# Patient Record
Sex: FEMALE | Race: WHITE | NOT HISPANIC OR LATINO | Employment: OTHER | URBAN - METROPOLITAN AREA
[De-identification: names, ages, dates, MRNs, and addresses within clinical notes are randomized per-mention and may not be internally consistent; named-entity substitution may affect disease eponyms.]

---

## 2017-01-06 ENCOUNTER — HOSPITAL ENCOUNTER (OUTPATIENT)
Dept: INFUSION CENTER | Facility: CLINIC | Age: 82
Discharge: HOME | End: 2017-01-06
Payer: MEDICARE

## 2017-01-06 LAB
ERYTHROCYTE [DISTWIDTH] IN BLOOD BY AUTOMATED COUNT: 17.2 % (ref 11.6–15.1)
GRANULOCYTES NFR BLD AUTO: 72.3 % (ref 47–80)
GRANULOCYTES NFR BLD: 3.7 THOUSAND/ΜL (ref 1.85–7.82)
HCT VFR BLD AUTO: 29.5 % (ref 34.8–46.1)
HGB BLD-MCNC: 9.5 G/DL (ref 11.5–15.4)
LYMPHOCYTES # BLD AUTO: 1.1 THOUSANDS/ΜL (ref 0.6–4.47)
LYMPHOCYTES NFR BLD AUTO: 22 % (ref 14–44)
MCH RBC QN AUTO: 29 PG (ref 26.8–34.3)
MCHC RBC AUTO-ENTMCNC: 32.1 G/DL (ref 31.4–37.4)
MCV RBC AUTO: 90 FL (ref 82–98)
MONOCYTES # BLD AUTO: 0.3 THOUSAND/ΜL (ref 0.17–1.22)
MONOCYTES NFR BLD AUTO: 5 % (ref 4–12)
PLATELET # BLD AUTO: 288 THOUSANDS/UL (ref 149–390)
PMV BLD AUTO: 7.6 FL (ref 8.9–12.7)
RBC # BLD AUTO: 3.27 MILLION/UL (ref 3.81–5.12)
WBC # BLD AUTO: 5.1 THOUSAND/UL (ref 4.31–10.16)
WBC NRBC COR # BLD: 5.1 THOUSAND/UL (ref 4.31–10.16)

## 2017-01-06 PROCEDURE — 85025 COMPLETE CBC W/AUTO DIFF WBC: CPT | Performed by: INTERNAL MEDICINE

## 2017-01-06 NOTE — PROGRESS NOTES
Presented today for port flush & labs port flushed with NSS d/t sensitivity to heparin  Will return for next appt as scheduled

## 2017-01-25 ENCOUNTER — HOSPITAL ENCOUNTER (OUTPATIENT)
Dept: INFUSION CENTER | Facility: CLINIC | Age: 82
Discharge: HOME | End: 2017-01-25
Payer: MEDICARE

## 2017-01-25 LAB
ANISOCYTOSIS BLD QL SMEAR: PRESENT
BASOPHILS # BLD AUTO: 0 THOUSAND/UL (ref 0–0.1)
BASOPHILS NFR MAR MANUAL: 0 % (ref 0–1)
EOSINOPHIL # BLD AUTO: 0.36 THOUSAND/UL (ref 0–0.61)
EOSINOPHIL NFR BLD MANUAL: 7 % (ref 0–6)
ERYTHROCYTE [DISTWIDTH] IN BLOOD BY AUTOMATED COUNT: 17.7 % (ref 11.6–15.1)
HCT VFR BLD AUTO: 30.3 % (ref 34.8–46.1)
HGB BLD-MCNC: 9.6 G/DL (ref 11.5–15.4)
LYMPHOCYTES # BLD AUTO: 0.97 THOUSAND/UL (ref 0.6–4.47)
LYMPHOCYTES # BLD AUTO: 19 % (ref 14–44)
MCH RBC QN AUTO: 28.4 PG (ref 26.8–34.3)
MCHC RBC AUTO-ENTMCNC: 31.7 G/DL (ref 31.4–37.4)
MCV RBC AUTO: 90 FL (ref 82–98)
MONOCYTES # BLD AUTO: 0.15 THOUSAND/UL (ref 0–1.22)
MONOCYTES NFR BLD AUTO: 3 % (ref 4–12)
NEUTS BAND NFR BLD MANUAL: 0 % (ref 0–8)
NEUTS SEG # BLD: 3.57 THOUSAND/UL (ref 1.81–6.82)
NEUTS SEG NFR BLD AUTO: 70 % (ref 43–75)
PLATELET # BLD AUTO: 278 THOUSANDS/UL (ref 149–390)
PLATELET BLD QL SMEAR: ADEQUATE
PMV BLD AUTO: 7.4 FL (ref 8.9–12.7)
RBC # BLD AUTO: 3.39 MILLION/UL (ref 3.81–5.12)
TOTAL CELLS COUNTED SPEC: 100
VARIANT LYMPHS # BLD AUTO: 1 % (ref 0–0)
WBC # BLD AUTO: 5.1 THOUSAND/UL (ref 4.31–10.16)
WBC NRBC COR # BLD: 5.1 THOUSAND/UL (ref 4.31–10.16)

## 2017-01-25 PROCEDURE — 85027 COMPLETE CBC AUTOMATED: CPT | Performed by: INTERNAL MEDICINE

## 2017-01-25 PROCEDURE — 85007 BL SMEAR W/DIFF WBC COUNT: CPT | Performed by: INTERNAL MEDICINE

## 2017-01-25 NOTE — PROGRESS NOTES
Patient tolerated her central lab draw from her port well without adverse affect   Patient declined after visit summary

## 2017-01-25 NOTE — PLAN OF CARE
Problem: Potential for Falls  Goal: Patient will remain free of falls  INTERVENTIONS:  - Assess patient frequently for physical needs  - Identify cognitive and physical deficits and behaviors that affect risk of falls    - [place] fall precautions as indicated by assessment   - Educate patient/family on patient safety including physical limitations  - Instruct patient to call for assistance with activity based on assessment  - Modify environment to reduce risk of injury  - Consider OT/PT consult to assist with strengthening/mobility   Outcome: Progressing

## 2017-02-27 ENCOUNTER — ALLSCRIPTS OFFICE VISIT (OUTPATIENT)
Dept: OTHER | Facility: OTHER | Age: 82
End: 2017-02-27

## 2017-03-03 ENCOUNTER — ALLSCRIPTS OFFICE VISIT (OUTPATIENT)
Dept: OTHER | Facility: OTHER | Age: 82
End: 2017-03-03

## 2017-03-03 DIAGNOSIS — J20.9 ACUTE BRONCHITIS: ICD-10-CM

## 2017-03-04 ENCOUNTER — TRANSCRIBE ORDERS (OUTPATIENT)
Dept: ADMINISTRATIVE | Facility: HOSPITAL | Age: 82
End: 2017-03-04

## 2017-03-04 ENCOUNTER — HOSPITAL ENCOUNTER (OUTPATIENT)
Dept: RADIOLOGY | Facility: MEDICAL CENTER | Age: 82
Discharge: HOME | End: 2017-03-04
Payer: MEDICARE

## 2017-03-04 DIAGNOSIS — J20.9 ACUTE BRONCHITIS: ICD-10-CM

## 2017-03-04 PROCEDURE — 71020 HB CHEST X-RAY 2VW FRONTAL&LATL: CPT

## 2017-03-15 ENCOUNTER — HOSPITAL ENCOUNTER (OUTPATIENT)
Dept: INFUSION CENTER | Facility: CLINIC | Age: 82
Discharge: HOME | End: 2017-03-15
Payer: MEDICARE

## 2017-03-20 ENCOUNTER — HOSPITAL ENCOUNTER (OUTPATIENT)
Dept: INFUSION CENTER | Facility: CLINIC | Age: 82
Discharge: HOME | End: 2017-03-20
Payer: MEDICARE

## 2017-03-27 ENCOUNTER — HOSPITAL ENCOUNTER (OUTPATIENT)
Dept: INFUSION CENTER | Facility: CLINIC | Age: 82
Discharge: HOME | End: 2017-03-27
Payer: MEDICARE

## 2017-03-27 LAB
ANISOCYTOSIS BLD QL SMEAR: PRESENT
BASOPHILS # BLD AUTO: 0 THOUSAND/UL (ref 0–0.1)
BASOPHILS NFR MAR MANUAL: 0 % (ref 0–1)
EOSINOPHIL # BLD AUTO: 0.13 THOUSAND/UL (ref 0–0.61)
EOSINOPHIL NFR BLD MANUAL: 3 % (ref 0–6)
ERYTHROCYTE [DISTWIDTH] IN BLOOD BY AUTOMATED COUNT: 17.4 % (ref 11.6–15.1)
HCT VFR BLD AUTO: 30.6 % (ref 34.8–46.1)
HGB BLD-MCNC: 9.9 G/DL (ref 11.5–15.4)
LYMPHOCYTES # BLD AUTO: 0.92 THOUSAND/UL (ref 0.6–4.47)
LYMPHOCYTES # BLD AUTO: 22 % (ref 14–44)
MCH RBC QN AUTO: 28.5 PG (ref 26.8–34.3)
MCHC RBC AUTO-ENTMCNC: 32.3 G/DL (ref 31.4–37.4)
MCV RBC AUTO: 88 FL (ref 82–98)
MONOCYTES # BLD AUTO: 0.29 THOUSAND/UL (ref 0–1.22)
MONOCYTES NFR BLD AUTO: 7 % (ref 4–12)
NEUTS BAND NFR BLD MANUAL: 0 % (ref 0–8)
NEUTS SEG # BLD: 2.81 THOUSAND/UL (ref 1.81–6.82)
NEUTS SEG NFR BLD AUTO: 67 % (ref 43–75)
PLATELET # BLD AUTO: 243 THOUSANDS/UL (ref 149–390)
PLATELET BLD QL SMEAR: ADEQUATE
PMV BLD AUTO: 7.6 FL (ref 8.9–12.7)
RBC # BLD AUTO: 3.47 MILLION/UL (ref 3.81–5.12)
TOTAL CELLS COUNTED SPEC: 100
VARIANT LYMPHS # BLD AUTO: 1 % (ref 0–0)
WBC # BLD AUTO: 4.2 THOUSAND/UL (ref 4.31–10.16)
WBC NRBC COR # BLD: 4.2 THOUSAND/UL (ref 4.31–10.16)

## 2017-03-27 PROCEDURE — 85007 BL SMEAR W/DIFF WBC COUNT: CPT | Performed by: INTERNAL MEDICINE

## 2017-03-27 PROCEDURE — 85027 COMPLETE CBC AUTOMATED: CPT | Performed by: INTERNAL MEDICINE

## 2017-03-27 NOTE — PLAN OF CARE
Problem: Potential for Falls  Goal: Patient will remain free of falls  INTERVENTIONS:  - Assess patient frequently for physical needs  - Identify cognitive and physical deficits and behaviors that affect risk of falls    - [place] fall precautions as indicated by assessment   - Educate patient/family on patient safety including physical limitations  - Instruct patient to call for assistance with activity based on assessment  - Modify environment to reduce risk of injury  - Consider OT/PT consult to assist with strengthening/mobility   Outcome: Progressing

## 2017-04-12 ENCOUNTER — GENERIC CONVERSION - ENCOUNTER (OUTPATIENT)
Dept: OTHER | Facility: OTHER | Age: 82
End: 2017-04-12

## 2017-05-08 ENCOUNTER — HOSPITAL ENCOUNTER (OUTPATIENT)
Dept: INFUSION CENTER | Facility: CLINIC | Age: 82
Discharge: HOME | End: 2017-05-08
Payer: MEDICARE

## 2017-05-08 LAB
ANISOCYTOSIS BLD QL SMEAR: PRESENT
BASOPHILS # BLD AUTO: 0.05 THOUSAND/UL (ref 0–0.1)
BASOPHILS NFR MAR MANUAL: 1 % (ref 0–1)
EOSINOPHIL # BLD AUTO: 0.16 THOUSAND/UL (ref 0–0.61)
EOSINOPHIL NFR BLD MANUAL: 3 % (ref 0–6)
ERYTHROCYTE [DISTWIDTH] IN BLOOD BY AUTOMATED COUNT: 18.7 % (ref 11.6–15.1)
HCT VFR BLD AUTO: 31 % (ref 34.8–46.1)
HGB BLD-MCNC: 9.9 G/DL (ref 11.5–15.4)
LYMPHOCYTES # BLD AUTO: 0.99 THOUSAND/UL (ref 0.6–4.47)
LYMPHOCYTES # BLD AUTO: 19 % (ref 14–44)
MCH RBC QN AUTO: 28.6 PG (ref 26.8–34.3)
MCHC RBC AUTO-ENTMCNC: 32 G/DL (ref 31.4–37.4)
MCV RBC AUTO: 90 FL (ref 82–98)
MONOCYTES # BLD AUTO: 0.36 THOUSAND/UL (ref 0–1.22)
MONOCYTES NFR BLD AUTO: 7 % (ref 4–12)
NEUTS BAND NFR BLD MANUAL: 1 % (ref 0–8)
NEUTS SEG # BLD: 3.64 THOUSAND/UL (ref 1.81–6.82)
NEUTS SEG NFR BLD AUTO: 69 % (ref 43–75)
OVALOCYTES BLD QL SMEAR: PRESENT
PLATELET # BLD AUTO: 260 THOUSANDS/UL (ref 149–390)
PLATELET BLD QL SMEAR: ADEQUATE
PMV BLD AUTO: 7.6 FL (ref 8.9–12.7)
RBC # BLD AUTO: 3.47 MILLION/UL (ref 3.81–5.12)
TOTAL CELLS COUNTED SPEC: 100
WBC # BLD AUTO: 5.2 THOUSAND/UL (ref 4.31–10.16)
WBC NRBC COR # BLD: 5.2 THOUSAND/UL (ref 4.31–10.16)

## 2017-05-08 PROCEDURE — 85027 COMPLETE CBC AUTOMATED: CPT | Performed by: INTERNAL MEDICINE

## 2017-05-08 PROCEDURE — 85007 BL SMEAR W/DIFF WBC COUNT: CPT | Performed by: INTERNAL MEDICINE

## 2017-05-08 NOTE — PROGRESS NOTES
Patient here for port flush and central line bloodwork  Patient offers no complaints  Blood work drawn with port access  Port flushed per protocol without heparin due to patient allergy  Next appointment scheduled before d/c   Declined AVS

## 2017-06-19 ENCOUNTER — HOSPITAL ENCOUNTER (OUTPATIENT)
Dept: INFUSION CENTER | Facility: CLINIC | Age: 82
Discharge: HOME | End: 2017-06-19
Payer: MEDICARE

## 2017-06-19 PROCEDURE — 96523 IRRIG DRUG DELIVERY DEVICE: CPT

## 2017-06-19 NOTE — PROGRESS NOTES
Pt refusing AVS at this time   Pt aware of future appointments, pt d/dong without difficulty or complaint

## 2017-06-19 NOTE — PROGRESS NOTES
Spoke with Jaiden from Dr Rodriguez [place] office   Per office no script for blood work pt only here for port flush

## 2017-06-29 ENCOUNTER — ALLSCRIPTS OFFICE VISIT (OUTPATIENT)
Dept: OTHER | Facility: OTHER | Age: 82
End: 2017-06-29

## 2017-07-31 ENCOUNTER — HOSPITAL ENCOUNTER (OUTPATIENT)
Dept: INFUSION CENTER | Facility: CLINIC | Age: 82
Discharge: HOME | End: 2017-07-31
Payer: MEDICARE

## 2017-07-31 LAB
ANISOCYTOSIS BLD QL SMEAR: PRESENT
BASOPHILS # BLD AUTO: 0 THOUSAND/UL (ref 0–0.1)
BASOPHILS NFR MAR MANUAL: 0 % (ref 0–1)
EOSINOPHIL # BLD AUTO: 0.1 THOUSAND/UL (ref 0–0.61)
EOSINOPHIL NFR BLD MANUAL: 2 % (ref 0–6)
ERYTHROCYTE [DISTWIDTH] IN BLOOD BY AUTOMATED COUNT: 17.2 % (ref 11.6–15.1)
HCT VFR BLD AUTO: 32.1 % (ref 34.8–46.1)
HGB BLD-MCNC: 10.7 G/DL (ref 11.5–15.4)
LYMPHOCYTES # BLD AUTO: 1.27 THOUSAND/UL (ref 0.6–4.47)
LYMPHOCYTES # BLD AUTO: 26 % (ref 14–44)
MCH RBC QN AUTO: 30.4 PG (ref 26.8–34.3)
MCHC RBC AUTO-ENTMCNC: 33.3 G/DL (ref 31.4–37.4)
MCV RBC AUTO: 91 FL (ref 82–98)
MONOCYTES # BLD AUTO: 0.25 THOUSAND/UL (ref 0–1.22)
MONOCYTES NFR BLD AUTO: 5 % (ref 4–12)
NEUTS BAND NFR BLD MANUAL: 1 % (ref 0–8)
NEUTS SEG # BLD: 3.28 THOUSAND/UL (ref 1.81–6.82)
NEUTS SEG NFR BLD AUTO: 66 % (ref 43–75)
PLATELET # BLD AUTO: 248 THOUSANDS/UL (ref 149–390)
PLATELET BLD QL SMEAR: ADEQUATE
PMV BLD AUTO: 7.3 FL (ref 8.9–12.7)
RBC # BLD AUTO: 3.51 MILLION/UL (ref 3.81–5.12)
TOTAL CELLS COUNTED SPEC: 100
WBC # BLD AUTO: 4.9 THOUSAND/UL (ref 4.31–10.16)
WBC NRBC COR # BLD: 4.9 THOUSAND/UL (ref 4.31–10.16)

## 2017-07-31 PROCEDURE — 85007 BL SMEAR W/DIFF WBC COUNT: CPT | Performed by: INTERNAL MEDICINE

## 2017-07-31 PROCEDURE — 85027 COMPLETE CBC AUTOMATED: CPT | Performed by: INTERNAL MEDICINE

## 2017-07-31 NOTE — PLAN OF CARE
Problem: Potential for Falls  Goal: Patient will remain free of falls  INTERVENTIONS:  - Assess patient frequently for physical needs  -  Identify cognitive and physical deficits and behaviors that affect risk of falls    -  [place] fall precautions as indicated by assessment   - Educate patient/family on patient safety including physical limitations  - Instruct patient to call for assistance with activity based on assessment  - Modify environment to reduce risk of injury  - Consider OT/PT consult to assist with strengthening/mobility   Outcome: Progressing

## 2017-08-05 ENCOUNTER — HOSPITAL ENCOUNTER (EMERGENCY)
Facility: HOSPITAL | Age: 82
Discharge: HOME | DRG: 378 | End: 2017-08-05
Attending: EMERGENCY MEDICINE | Admitting: EMERGENCY MEDICINE
Payer: MEDICARE

## 2017-08-05 VITALS
RESPIRATION RATE: 20 BRPM | TEMPERATURE: 97.9 F | DIASTOLIC BLOOD PRESSURE: 63 MMHG | SYSTOLIC BLOOD PRESSURE: 157 MMHG | OXYGEN SATURATION: 93 % | HEART RATE: 81 BPM

## 2017-08-05 DIAGNOSIS — K62.5 RECTAL BLEEDING: Primary | ICD-10-CM

## 2017-08-05 LAB
ANION GAP SERPL CALCULATED.3IONS-SCNC: 4 MMOL/L (ref 4–13)
APTT PPP: 30 SECONDS (ref 23–35)
BASOPHILS # BLD AUTO: 0.01 THOUSANDS/ΜL (ref 0–0.1)
BASOPHILS NFR BLD AUTO: 0 % (ref 0–1)
BUN SERPL-MCNC: 20 MG/DL (ref 5–25)
CALCIUM SERPL-MCNC: 9.8 MG/DL (ref 8.3–10.1)
CHLORIDE SERPL-SCNC: 102 MMOL/L (ref 100–108)
CO2 SERPL-SCNC: 35 MMOL/L (ref 21–32)
CREAT SERPL-MCNC: 0.76 MG/DL (ref 0.6–1.3)
EOSINOPHIL # BLD AUTO: 0.17 THOUSAND/ΜL (ref 0–0.61)
EOSINOPHIL NFR BLD AUTO: 4 % (ref 0–6)
ERYTHROCYTE [DISTWIDTH] IN BLOOD BY AUTOMATED COUNT: 16.2 % (ref 11.6–15.1)
GFR SERPL CREATININE-BSD FRML MDRD: 73 ML/MIN/1.73SQ M
GLUCOSE SERPL-MCNC: 88 MG/DL (ref 65–140)
HCT VFR BLD AUTO: 29.6 % (ref 34.8–46.1)
HGB BLD-MCNC: 9.6 G/DL (ref 11.5–15.4)
INR PPP: 0.93 (ref 0.86–1.16)
LYMPHOCYTES # BLD AUTO: 0.82 THOUSANDS/ΜL (ref 0.6–4.47)
LYMPHOCYTES NFR BLD AUTO: 18 % (ref 14–44)
MCH RBC QN AUTO: 30.5 PG (ref 26.8–34.3)
MCHC RBC AUTO-ENTMCNC: 32.4 G/DL (ref 31.4–37.4)
MCV RBC AUTO: 94 FL (ref 82–98)
MONOCYTES # BLD AUTO: 0.36 THOUSAND/ΜL (ref 0.17–1.22)
MONOCYTES NFR BLD AUTO: 8 % (ref 4–12)
NEUTROPHILS # BLD AUTO: 3.26 THOUSANDS/ΜL (ref 1.85–7.62)
NEUTS SEG NFR BLD AUTO: 70 % (ref 43–75)
NRBC BLD AUTO-RTO: 0 /100 WBCS
PLATELET # BLD AUTO: 237 THOUSANDS/UL (ref 149–390)
PMV BLD AUTO: 9.8 FL (ref 8.9–12.7)
POTASSIUM SERPL-SCNC: 4 MMOL/L (ref 3.5–5.3)
PROTHROMBIN TIME: 12.5 SECONDS (ref 12.1–14.4)
RBC # BLD AUTO: 3.15 MILLION/UL (ref 3.81–5.12)
SODIUM SERPL-SCNC: 141 MMOL/L (ref 136–145)
WBC # BLD AUTO: 4.62 THOUSAND/UL (ref 4.31–10.16)

## 2017-08-05 PROCEDURE — 99285 EMERGENCY DEPT VISIT HI MDM: CPT

## 2017-08-05 PROCEDURE — 85610 PROTHROMBIN TIME: CPT | Performed by: EMERGENCY MEDICINE

## 2017-08-05 PROCEDURE — 82272 OCCULT BLD FECES 1-3 TESTS: CPT

## 2017-08-05 PROCEDURE — 80048 BASIC METABOLIC PNL TOTAL CA: CPT | Performed by: EMERGENCY MEDICINE

## 2017-08-05 PROCEDURE — 36415 COLL VENOUS BLD VENIPUNCTURE: CPT | Performed by: EMERGENCY MEDICINE

## 2017-08-05 PROCEDURE — 96374 THER/PROPH/DIAG INJ IV PUSH: CPT

## 2017-08-05 PROCEDURE — 85730 THROMBOPLASTIN TIME PARTIAL: CPT | Performed by: EMERGENCY MEDICINE

## 2017-08-05 PROCEDURE — 85025 COMPLETE CBC W/AUTO DIFF WBC: CPT | Performed by: EMERGENCY MEDICINE

## 2017-08-05 RX ORDER — MORPHINE SULFATE 2 MG/ML
2 INJECTION, SOLUTION INTRAMUSCULAR; INTRAVENOUS ONCE
Status: DISCONTINUED | OUTPATIENT
Start: 2017-08-05 | End: 2017-08-05 | Stop reason: CLARIF

## 2017-08-05 RX ORDER — TRAMADOL HYDROCHLORIDE 50 MG/1
50 TABLET ORAL EVERY 6 HOURS PRN
Qty: 10 TABLET | Refills: 0 | Status: SHIPPED | OUTPATIENT
Start: 2017-08-05

## 2017-08-05 RX ORDER — MORPHINE SULFATE 4 MG/ML
2 INJECTION, SOLUTION INTRAMUSCULAR; INTRAVENOUS ONCE
Status: COMPLETED | OUTPATIENT
Start: 2017-08-05 | End: 2017-08-05

## 2017-08-05 RX ADMIN — MORPHINE SULFATE 2 MG: 4 INJECTION, SOLUTION INTRAMUSCULAR; INTRAVENOUS at 14:30

## 2017-08-07 ENCOUNTER — GENERIC CONVERSION - ENCOUNTER (OUTPATIENT)
Dept: OTHER | Facility: OTHER | Age: 82
End: 2017-08-07

## 2017-08-07 ENCOUNTER — HOSPITAL ENCOUNTER (INPATIENT)
Facility: HOSPITAL | Age: 82
LOS: 3 days | Discharge: HOME HEALTH | DRG: 378 | End: 2017-08-10
Attending: INTERNAL MEDICINE | Admitting: INTERNAL MEDICINE
Payer: MEDICARE

## 2017-08-07 ENCOUNTER — APPOINTMENT (INPATIENT)
Dept: CT IMAGING | Facility: HOSPITAL | Age: 82
DRG: 378 | End: 2017-08-07
Payer: MEDICARE

## 2017-08-07 DIAGNOSIS — G35 MS (MULTIPLE SCLEROSIS) (HCC): ICD-10-CM

## 2017-08-07 DIAGNOSIS — K50.90 CROHN DISEASE (HCC): ICD-10-CM

## 2017-08-07 DIAGNOSIS — M48.00 SPINAL STENOSIS: ICD-10-CM

## 2017-08-07 DIAGNOSIS — IMO0002: ICD-10-CM

## 2017-08-07 DIAGNOSIS — K62.5 BRBPR (BRIGHT RED BLOOD PER RECTUM): Primary | ICD-10-CM

## 2017-08-07 DIAGNOSIS — G89.4 PAIN SYNDROME, CHRONIC: ICD-10-CM

## 2017-08-07 LAB
ALBUMIN SERPL BCP-MCNC: 3.2 G/DL (ref 3.5–5)
ALP SERPL-CCNC: 54 U/L (ref 46–116)
ALT SERPL W P-5'-P-CCNC: 27 U/L (ref 12–78)
ANION GAP SERPL CALCULATED.3IONS-SCNC: 5 MMOL/L (ref 4–13)
APTT PPP: 32 SECONDS (ref 23–35)
AST SERPL W P-5'-P-CCNC: 18 U/L (ref 5–45)
BASOPHILS # BLD AUTO: 0.01 THOUSANDS/ΜL (ref 0–0.1)
BASOPHILS NFR BLD AUTO: 0 % (ref 0–1)
BILIRUB SERPL-MCNC: 0.31 MG/DL (ref 0.2–1)
BUN SERPL-MCNC: 16 MG/DL (ref 5–25)
CALCIUM SERPL-MCNC: 9.5 MG/DL (ref 8.3–10.1)
CHLORIDE SERPL-SCNC: 104 MMOL/L (ref 100–108)
CO2 SERPL-SCNC: 32 MMOL/L (ref 21–32)
CREAT SERPL-MCNC: 0.76 MG/DL (ref 0.6–1.3)
EOSINOPHIL # BLD AUTO: 0.16 THOUSAND/ΜL (ref 0–0.61)
EOSINOPHIL NFR BLD AUTO: 3 % (ref 0–6)
ERYTHROCYTE [DISTWIDTH] IN BLOOD BY AUTOMATED COUNT: 16.4 % (ref 11.6–15.1)
GFR SERPL CREATININE-BSD FRML MDRD: 73 ML/MIN/1.73SQ M
GLUCOSE SERPL-MCNC: 100 MG/DL (ref 65–140)
HCT VFR BLD AUTO: 26 % (ref 34.8–46.1)
HGB BLD-MCNC: 8.3 G/DL (ref 11.5–15.4)
INR PPP: 0.97 (ref 0.86–1.16)
LYMPHOCYTES # BLD AUTO: 0.92 THOUSANDS/ΜL (ref 0.6–4.47)
LYMPHOCYTES NFR BLD AUTO: 17 % (ref 14–44)
MCH RBC QN AUTO: 30.2 PG (ref 26.8–34.3)
MCHC RBC AUTO-ENTMCNC: 31.9 G/DL (ref 31.4–37.4)
MCV RBC AUTO: 95 FL (ref 82–98)
MONOCYTES # BLD AUTO: 0.42 THOUSAND/ΜL (ref 0.17–1.22)
MONOCYTES NFR BLD AUTO: 8 % (ref 4–12)
NEUTROPHILS # BLD AUTO: 3.8 THOUSANDS/ΜL (ref 1.85–7.62)
NEUTS SEG NFR BLD AUTO: 72 % (ref 43–75)
NRBC BLD AUTO-RTO: 0 /100 WBCS
PLATELET # BLD AUTO: 234 THOUSANDS/UL (ref 149–390)
PMV BLD AUTO: 9.8 FL (ref 8.9–12.7)
POTASSIUM SERPL-SCNC: 4.1 MMOL/L (ref 3.5–5.3)
PROT SERPL-MCNC: 6.9 G/DL (ref 6.4–8.2)
PROTHROMBIN TIME: 12.9 SECONDS (ref 12.1–14.4)
RBC # BLD AUTO: 2.75 MILLION/UL (ref 3.81–5.12)
SODIUM SERPL-SCNC: 141 MMOL/L (ref 136–145)
WBC # BLD AUTO: 5.31 THOUSAND/UL (ref 4.31–10.16)

## 2017-08-07 PROCEDURE — 85730 THROMBOPLASTIN TIME PARTIAL: CPT | Performed by: PHYSICIAN ASSISTANT

## 2017-08-07 PROCEDURE — C9113 INJ PANTOPRAZOLE SODIUM, VIA: HCPCS | Performed by: INTERNAL MEDICINE

## 2017-08-07 PROCEDURE — 85025 COMPLETE CBC W/AUTO DIFF WBC: CPT | Performed by: PHYSICIAN ASSISTANT

## 2017-08-07 PROCEDURE — 74177 CT ABD & PELVIS W/CONTRAST: CPT

## 2017-08-07 PROCEDURE — 80053 COMPREHEN METABOLIC PANEL: CPT | Performed by: PHYSICIAN ASSISTANT

## 2017-08-07 PROCEDURE — 85610 PROTHROMBIN TIME: CPT | Performed by: PHYSICIAN ASSISTANT

## 2017-08-07 RX ORDER — TRAMADOL HYDROCHLORIDE 50 MG/1
50 TABLET ORAL EVERY 6 HOURS PRN
Status: DISCONTINUED | OUTPATIENT
Start: 2017-08-07 | End: 2017-08-10 | Stop reason: HOSPADM

## 2017-08-07 RX ORDER — ACETAMINOPHEN 325 MG/1
650 TABLET ORAL EVERY 6 HOURS PRN
Status: DISCONTINUED | OUTPATIENT
Start: 2017-08-07 | End: 2017-08-10 | Stop reason: HOSPADM

## 2017-08-07 RX ORDER — POLYVINYL ALCOHOL 14 MG/ML
1 SOLUTION/ DROPS OPHTHALMIC AS NEEDED
Status: DISCONTINUED | OUTPATIENT
Start: 2017-08-07 | End: 2017-08-10 | Stop reason: HOSPADM

## 2017-08-07 RX ORDER — MAGNESIUM CARB/ALUMINUM HYDROX 105-160MG
296 TABLET,CHEWABLE ORAL ONCE
Status: DISCONTINUED | OUTPATIENT
Start: 2017-08-07 | End: 2017-08-10 | Stop reason: HOSPADM

## 2017-08-07 RX ORDER — SODIUM CHLORIDE 9 MG/ML
75 INJECTION, SOLUTION INTRAVENOUS CONTINUOUS
Status: DISCONTINUED | OUTPATIENT
Start: 2017-08-07 | End: 2017-08-09

## 2017-08-07 RX ORDER — PREGABALIN 75 MG/1
75 CAPSULE ORAL 2 TIMES DAILY
Status: DISCONTINUED | OUTPATIENT
Start: 2017-08-07 | End: 2017-08-10 | Stop reason: HOSPADM

## 2017-08-07 RX ORDER — PANTOPRAZOLE SODIUM 40 MG/1
40 INJECTION, POWDER, FOR SOLUTION INTRAVENOUS EVERY 12 HOURS SCHEDULED
Status: DISCONTINUED | OUTPATIENT
Start: 2017-08-07 | End: 2017-08-10 | Stop reason: HOSPADM

## 2017-08-07 RX ORDER — ESCITALOPRAM OXALATE 10 MG/1
5 TABLET ORAL DAILY
Status: DISCONTINUED | OUTPATIENT
Start: 2017-08-08 | End: 2017-08-10 | Stop reason: HOSPADM

## 2017-08-07 RX ORDER — ONDANSETRON 2 MG/ML
4 INJECTION INTRAMUSCULAR; INTRAVENOUS EVERY 6 HOURS PRN
Status: DISCONTINUED | OUTPATIENT
Start: 2017-08-07 | End: 2017-08-10 | Stop reason: HOSPADM

## 2017-08-07 RX ORDER — MORPHINE SULFATE 2 MG/ML
1 INJECTION, SOLUTION INTRAMUSCULAR; INTRAVENOUS EVERY 4 HOURS PRN
Status: DISCONTINUED | OUTPATIENT
Start: 2017-08-07 | End: 2017-08-10 | Stop reason: HOSPADM

## 2017-08-07 RX ADMIN — PANTOPRAZOLE SODIUM 40 MG: 40 INJECTION, POWDER, FOR SOLUTION INTRAVENOUS at 20:28

## 2017-08-07 RX ADMIN — IODIXANOL 100 ML: 320 INJECTION, SOLUTION INTRAVASCULAR at 20:01

## 2017-08-07 RX ADMIN — PREGABALIN 75 MG: 75 CAPSULE ORAL at 19:28

## 2017-08-07 RX ADMIN — MORPHINE SULFATE 1 MG: 2 INJECTION, SOLUTION INTRAMUSCULAR; INTRAVENOUS at 20:27

## 2017-08-08 ENCOUNTER — ANESTHESIA EVENT (INPATIENT)
Dept: GASTROENTEROLOGY | Facility: HOSPITAL | Age: 82
DRG: 378 | End: 2017-08-08
Payer: MEDICARE

## 2017-08-08 ENCOUNTER — ANESTHESIA (INPATIENT)
Dept: GASTROENTEROLOGY | Facility: HOSPITAL | Age: 82
DRG: 378 | End: 2017-08-08
Payer: MEDICARE

## 2017-08-08 LAB
ABO GROUP BLD: NORMAL
ALBUMIN SERPL BCP-MCNC: 3 G/DL (ref 3.5–5)
ALP SERPL-CCNC: 49 U/L (ref 46–116)
ALT SERPL W P-5'-P-CCNC: 27 U/L (ref 12–78)
ANION GAP SERPL CALCULATED.3IONS-SCNC: 1 MMOL/L (ref 4–13)
AST SERPL W P-5'-P-CCNC: 16 U/L (ref 5–45)
BILIRUB SERPL-MCNC: 0.34 MG/DL (ref 0.2–1)
BLD GP AB SCN SERPL QL: NEGATIVE
BUN SERPL-MCNC: 12 MG/DL (ref 5–25)
CALCIUM SERPL-MCNC: 8.8 MG/DL (ref 8.3–10.1)
CHLORIDE SERPL-SCNC: 106 MMOL/L (ref 100–108)
CO2 SERPL-SCNC: 33 MMOL/L (ref 21–32)
CREAT SERPL-MCNC: 0.68 MG/DL (ref 0.6–1.3)
ERYTHROCYTE [DISTWIDTH] IN BLOOD BY AUTOMATED COUNT: 16.3 % (ref 11.6–15.1)
GFR SERPL CREATININE-BSD FRML MDRD: 82 ML/MIN/1.73SQ M
GLUCOSE SERPL-MCNC: 73 MG/DL (ref 65–140)
HCT VFR BLD AUTO: 17.3 % (ref 34.8–46.1)
HCT VFR BLD AUTO: 26.3 % (ref 34.8–46.1)
HCT VFR BLD AUTO: 32.3 % (ref 34.8–46.1)
HGB BLD-MCNC: 10.7 G/DL (ref 11.5–15.4)
HGB BLD-MCNC: 5.5 G/DL (ref 11.5–15.4)
HGB BLD-MCNC: 8 G/DL (ref 11.5–15.4)
MCH RBC QN AUTO: 29.7 PG (ref 26.8–34.3)
MCHC RBC AUTO-ENTMCNC: 30.4 G/DL (ref 31.4–37.4)
MCV RBC AUTO: 98 FL (ref 82–98)
PLATELET # BLD AUTO: 264 THOUSANDS/UL (ref 149–390)
PMV BLD AUTO: 10.3 FL (ref 8.9–12.7)
POTASSIUM SERPL-SCNC: 3.6 MMOL/L (ref 3.5–5.3)
PROT SERPL-MCNC: 6.7 G/DL (ref 6.4–8.2)
RBC # BLD AUTO: 2.69 MILLION/UL (ref 3.81–5.12)
RH BLD: POSITIVE
SODIUM SERPL-SCNC: 140 MMOL/L (ref 136–145)
SPECIMEN EXPIRATION DATE: NORMAL
WBC # BLD AUTO: 5.2 THOUSAND/UL (ref 4.31–10.16)

## 2017-08-08 PROCEDURE — 86901 BLOOD TYPING SEROLOGIC RH(D): CPT | Performed by: PHYSICIAN ASSISTANT

## 2017-08-08 PROCEDURE — 87493 C DIFF AMPLIFIED PROBE: CPT | Performed by: PHYSICIAN ASSISTANT

## 2017-08-08 PROCEDURE — 85018 HEMOGLOBIN: CPT | Performed by: INTERNAL MEDICINE

## 2017-08-08 PROCEDURE — C9113 INJ PANTOPRAZOLE SODIUM, VIA: HCPCS | Performed by: INTERNAL MEDICINE

## 2017-08-08 PROCEDURE — 86850 RBC ANTIBODY SCREEN: CPT | Performed by: PHYSICIAN ASSISTANT

## 2017-08-08 PROCEDURE — 80053 COMPREHEN METABOLIC PANEL: CPT | Performed by: PHYSICIAN ASSISTANT

## 2017-08-08 PROCEDURE — 85018 HEMOGLOBIN: CPT | Performed by: HOSPITALIST

## 2017-08-08 PROCEDURE — 85027 COMPLETE CBC AUTOMATED: CPT | Performed by: PHYSICIAN ASSISTANT

## 2017-08-08 PROCEDURE — 87015 SPECIMEN INFECT AGNT CONCNTJ: CPT | Performed by: PHYSICIAN ASSISTANT

## 2017-08-08 PROCEDURE — 0DJD8ZZ INSPECTION OF LOWER INTESTINAL TRACT, VIA NATURAL OR ARTIFICIAL OPENING ENDOSCOPIC: ICD-10-PCS | Performed by: INTERNAL MEDICINE

## 2017-08-08 PROCEDURE — P9021 RED BLOOD CELLS UNIT: HCPCS

## 2017-08-08 PROCEDURE — 87046 STOOL CULTR AEROBIC BACT EA: CPT | Performed by: PHYSICIAN ASSISTANT

## 2017-08-08 PROCEDURE — 86923 COMPATIBILITY TEST ELECTRIC: CPT

## 2017-08-08 PROCEDURE — 87205 SMEAR GRAM STAIN: CPT | Performed by: PHYSICIAN ASSISTANT

## 2017-08-08 PROCEDURE — 86900 BLOOD TYPING SEROLOGIC ABO: CPT | Performed by: PHYSICIAN ASSISTANT

## 2017-08-08 PROCEDURE — 30233N1 TRANSFUSION OF NONAUTOLOGOUS RED BLOOD CELLS INTO PERIPHERAL VEIN, PERCUTANEOUS APPROACH: ICD-10-PCS | Performed by: INTERNAL MEDICINE

## 2017-08-08 PROCEDURE — 85014 HEMATOCRIT: CPT | Performed by: HOSPITALIST

## 2017-08-08 PROCEDURE — 87045 FECES CULTURE AEROBIC BACT: CPT | Performed by: PHYSICIAN ASSISTANT

## 2017-08-08 PROCEDURE — 85014 HEMATOCRIT: CPT | Performed by: INTERNAL MEDICINE

## 2017-08-08 RX ORDER — MAGNESIUM CARB/ALUMINUM HYDROX 105-160MG
296 TABLET,CHEWABLE ORAL ONCE
Status: COMPLETED | OUTPATIENT
Start: 2017-08-08 | End: 2017-08-08

## 2017-08-08 RX ORDER — SENNOSIDES 8.6 MG
2 TABLET ORAL ONCE
Status: COMPLETED | OUTPATIENT
Start: 2017-08-08 | End: 2017-08-08

## 2017-08-08 RX ORDER — PROPOFOL 10 MG/ML
INJECTION, EMULSION INTRAVENOUS AS NEEDED
Status: DISCONTINUED | OUTPATIENT
Start: 2017-08-08 | End: 2017-08-08 | Stop reason: SURG

## 2017-08-08 RX ADMIN — MORPHINE SULFATE 1 MG: 2 INJECTION, SOLUTION INTRAMUSCULAR; INTRAVENOUS at 13:58

## 2017-08-08 RX ADMIN — PREGABALIN 75 MG: 75 CAPSULE ORAL at 08:36

## 2017-08-08 RX ADMIN — SODIUM CHLORIDE 75 ML/HR: 0.9 INJECTION, SOLUTION INTRAVENOUS at 21:05

## 2017-08-08 RX ADMIN — MORPHINE SULFATE 1 MG: 2 INJECTION, SOLUTION INTRAMUSCULAR; INTRAVENOUS at 08:05

## 2017-08-08 RX ADMIN — PREGABALIN 75 MG: 75 CAPSULE ORAL at 17:11

## 2017-08-08 RX ADMIN — MAGESIUM CITRATE 296 ML: 1.75 LIQUID ORAL at 16:40

## 2017-08-08 RX ADMIN — SENNOSIDES 17.2 MG: 8.6 TABLET, FILM COATED ORAL at 18:50

## 2017-08-08 RX ADMIN — MORPHINE SULFATE 1 MG: 2 INJECTION, SOLUTION INTRAMUSCULAR; INTRAVENOUS at 04:05

## 2017-08-08 RX ADMIN — PANTOPRAZOLE SODIUM 40 MG: 40 INJECTION, POWDER, FOR SOLUTION INTRAVENOUS at 08:36

## 2017-08-08 RX ADMIN — PROPOFOL 40 MG: 10 INJECTION, EMULSION INTRAVENOUS at 14:32

## 2017-08-08 RX ADMIN — ESCITALOPRAM OXALATE 5 MG: 10 TABLET ORAL at 08:36

## 2017-08-08 RX ADMIN — PANTOPRAZOLE SODIUM 40 MG: 40 INJECTION, POWDER, FOR SOLUTION INTRAVENOUS at 21:03

## 2017-08-08 RX ADMIN — TRAMADOL HYDROCHLORIDE 50 MG: 50 TABLET, COATED ORAL at 08:44

## 2017-08-08 RX ADMIN — SODIUM CHLORIDE 75 ML/HR: 0.9 INJECTION, SOLUTION INTRAVENOUS at 13:31

## 2017-08-08 RX ADMIN — TRAMADOL HYDROCHLORIDE 50 MG: 50 TABLET, COATED ORAL at 16:39

## 2017-08-08 RX ADMIN — PREDNISOLONE ACETATE 1 DROP: 1.2 SUSPENSION/ DROPS OPHTHALMIC at 08:36

## 2017-08-09 ENCOUNTER — ANESTHESIA EVENT (INPATIENT)
Dept: GASTROENTEROLOGY | Facility: HOSPITAL | Age: 82
DRG: 378 | End: 2017-08-09
Payer: MEDICARE

## 2017-08-09 ENCOUNTER — ANESTHESIA (INPATIENT)
Dept: GASTROENTEROLOGY | Facility: HOSPITAL | Age: 82
DRG: 378 | End: 2017-08-09
Payer: MEDICARE

## 2017-08-09 LAB
ABO GROUP BLD BPU: NORMAL
ABO GROUP BLD BPU: NORMAL
BPU ID: NORMAL
BPU ID: NORMAL
C DIFF TOX GENS STL QL NAA+PROBE: NORMAL
UNIT DISPENSE STATUS: NORMAL
UNIT DISPENSE STATUS: NORMAL
UNIT PRODUCT CODE: NORMAL
UNIT PRODUCT CODE: NORMAL
UNIT RH: NORMAL
UNIT RH: NORMAL
WBC STL QL MICRO: NORMAL

## 2017-08-09 PROCEDURE — 0DBL8ZX EXCISION OF TRANSVERSE COLON, VIA NATURAL OR ARTIFICIAL OPENING ENDOSCOPIC, DIAGNOSTIC: ICD-10-PCS | Performed by: INTERNAL MEDICINE

## 2017-08-09 PROCEDURE — 88305 TISSUE EXAM BY PATHOLOGIST: CPT | Performed by: INTERNAL MEDICINE

## 2017-08-09 PROCEDURE — C9113 INJ PANTOPRAZOLE SODIUM, VIA: HCPCS | Performed by: INTERNAL MEDICINE

## 2017-08-09 RX ORDER — MORPHINE SULFATE 4 MG/ML
1 INJECTION, SOLUTION INTRAMUSCULAR; INTRAVENOUS ONCE
Status: COMPLETED | OUTPATIENT
Start: 2017-08-09 | End: 2017-08-09

## 2017-08-09 RX ORDER — PROPOFOL 10 MG/ML
INJECTION, EMULSION INTRAVENOUS AS NEEDED
Status: DISCONTINUED | OUTPATIENT
Start: 2017-08-09 | End: 2017-08-09 | Stop reason: SURG

## 2017-08-09 RX ORDER — LIDOCAINE HYDROCHLORIDE 10 MG/ML
INJECTION, SOLUTION INFILTRATION; PERINEURAL AS NEEDED
Status: DISCONTINUED | OUTPATIENT
Start: 2017-08-09 | End: 2017-08-09 | Stop reason: SURG

## 2017-08-09 RX ADMIN — TRAMADOL HYDROCHLORIDE 50 MG: 50 TABLET, COATED ORAL at 00:50

## 2017-08-09 RX ADMIN — PREGABALIN 75 MG: 75 CAPSULE ORAL at 08:15

## 2017-08-09 RX ADMIN — PANTOPRAZOLE SODIUM 40 MG: 40 INJECTION, POWDER, FOR SOLUTION INTRAVENOUS at 08:16

## 2017-08-09 RX ADMIN — LIDOCAINE HYDROCHLORIDE 60 MG: 10 INJECTION, SOLUTION INFILTRATION; PERINEURAL at 13:40

## 2017-08-09 RX ADMIN — SODIUM CHLORIDE 75 ML/HR: 0.9 INJECTION, SOLUTION INTRAVENOUS at 09:03

## 2017-08-09 RX ADMIN — ESCITALOPRAM OXALATE 5 MG: 10 TABLET ORAL at 08:15

## 2017-08-09 RX ADMIN — MORPHINE SULFATE 1 MG: 4 INJECTION, SOLUTION INTRAMUSCULAR; INTRAVENOUS at 15:54

## 2017-08-09 RX ADMIN — PROPOFOL 100 MG: 10 INJECTION, EMULSION INTRAVENOUS at 13:40

## 2017-08-09 RX ADMIN — TRAMADOL HYDROCHLORIDE 50 MG: 50 TABLET, COATED ORAL at 09:04

## 2017-08-09 RX ADMIN — PREGABALIN 75 MG: 75 CAPSULE ORAL at 18:43

## 2017-08-09 RX ADMIN — PROPOFOL 10 MG: 10 INJECTION, EMULSION INTRAVENOUS at 13:50

## 2017-08-09 RX ADMIN — MORPHINE SULFATE 1 MG: 2 INJECTION, SOLUTION INTRAMUSCULAR; INTRAVENOUS at 05:59

## 2017-08-09 RX ADMIN — PROPOFOL 10 MG: 10 INJECTION, EMULSION INTRAVENOUS at 13:53

## 2017-08-09 RX ADMIN — PANTOPRAZOLE SODIUM 40 MG: 40 INJECTION, POWDER, FOR SOLUTION INTRAVENOUS at 21:18

## 2017-08-09 RX ADMIN — PROPOFOL 10 MG: 10 INJECTION, EMULSION INTRAVENOUS at 13:55

## 2017-08-10 VITALS
TEMPERATURE: 97.9 F | OXYGEN SATURATION: 96 % | HEIGHT: 64 IN | SYSTOLIC BLOOD PRESSURE: 168 MMHG | HEART RATE: 71 BPM | RESPIRATION RATE: 18 BRPM | DIASTOLIC BLOOD PRESSURE: 70 MMHG

## 2017-08-10 LAB
BASOPHILS # BLD AUTO: 0.01 THOUSANDS/ΜL (ref 0–0.1)
BASOPHILS NFR BLD AUTO: 0 % (ref 0–1)
EOSINOPHIL # BLD AUTO: 0.14 THOUSAND/ΜL (ref 0–0.61)
EOSINOPHIL NFR BLD AUTO: 2 % (ref 0–6)
ERYTHROCYTE [DISTWIDTH] IN BLOOD BY AUTOMATED COUNT: 17 % (ref 11.6–15.1)
HCT VFR BLD AUTO: 33 % (ref 34.8–46.1)
HGB BLD-MCNC: 10.8 G/DL (ref 11.5–15.4)
LYMPHOCYTES # BLD AUTO: 0.74 THOUSANDS/ΜL (ref 0.6–4.47)
LYMPHOCYTES NFR BLD AUTO: 12 % (ref 14–44)
MCH RBC QN AUTO: 29.7 PG (ref 26.8–34.3)
MCHC RBC AUTO-ENTMCNC: 32.7 G/DL (ref 31.4–37.4)
MCV RBC AUTO: 91 FL (ref 82–98)
MONOCYTES # BLD AUTO: 0.71 THOUSAND/ΜL (ref 0.17–1.22)
MONOCYTES NFR BLD AUTO: 12 % (ref 4–12)
NEUTROPHILS # BLD AUTO: 4.42 THOUSANDS/ΜL (ref 1.85–7.62)
NEUTS SEG NFR BLD AUTO: 74 % (ref 43–75)
NRBC BLD AUTO-RTO: 0 /100 WBCS
PLATELET # BLD AUTO: 206 THOUSANDS/UL (ref 149–390)
PMV BLD AUTO: 10.2 FL (ref 8.9–12.7)
RBC # BLD AUTO: 3.64 MILLION/UL (ref 3.81–5.12)
WBC # BLD AUTO: 6.02 THOUSAND/UL (ref 4.31–10.16)

## 2017-08-10 PROCEDURE — C9113 INJ PANTOPRAZOLE SODIUM, VIA: HCPCS | Performed by: INTERNAL MEDICINE

## 2017-08-10 PROCEDURE — 85025 COMPLETE CBC W/AUTO DIFF WBC: CPT | Performed by: PHYSICIAN ASSISTANT

## 2017-08-10 RX ADMIN — MORPHINE SULFATE 1 MG: 2 INJECTION, SOLUTION INTRAMUSCULAR; INTRAVENOUS at 03:01

## 2017-08-10 RX ADMIN — PREGABALIN 75 MG: 75 CAPSULE ORAL at 09:33

## 2017-08-10 RX ADMIN — PREDNISOLONE ACETATE 1 DROP: 1.2 SUSPENSION/ DROPS OPHTHALMIC at 09:33

## 2017-08-10 RX ADMIN — ESCITALOPRAM OXALATE 5 MG: 10 TABLET ORAL at 09:33

## 2017-08-10 RX ADMIN — PANTOPRAZOLE SODIUM 40 MG: 40 INJECTION, POWDER, FOR SOLUTION INTRAVENOUS at 09:33

## 2017-08-11 LAB
BACTERIA STL CULT: NORMAL
BACTERIA STL CULT: NORMAL

## 2017-08-17 ENCOUNTER — GENERIC CONVERSION - ENCOUNTER (OUTPATIENT)
Dept: OTHER | Facility: OTHER | Age: 82
End: 2017-08-17

## 2017-09-04 DIAGNOSIS — R60.9 EDEMA: ICD-10-CM

## 2017-09-04 DIAGNOSIS — R73.9 HYPERGLYCEMIA: ICD-10-CM

## 2017-09-04 DIAGNOSIS — R51 HEADACHE(784.0): ICD-10-CM

## 2017-09-04 DIAGNOSIS — K50.90 CROHN'S DISEASE WITHOUT COMPLICATION (HCC): ICD-10-CM

## 2017-09-04 DIAGNOSIS — M48.00 SPINAL STENOSIS: ICD-10-CM

## 2017-09-04 DIAGNOSIS — D63.8 ANEMIA IN OTHER CHRONIC DISEASES CLASSIFIED ELSEWHERE: ICD-10-CM

## 2017-09-05 ENCOUNTER — GENERIC CONVERSION - ENCOUNTER (OUTPATIENT)
Dept: OTHER | Facility: OTHER | Age: 82
End: 2017-09-05

## 2017-09-05 ENCOUNTER — HOSPITAL ENCOUNTER (OUTPATIENT)
Dept: INFUSION CENTER | Facility: CLINIC | Age: 82
Discharge: HOME | End: 2017-09-05
Payer: MEDICARE

## 2017-09-05 LAB
ANISOCYTOSIS BLD QL SMEAR: PRESENT
BASOPHILS # BLD AUTO: 0 THOUSAND/UL (ref 0–0.1)
BASOPHILS NFR MAR MANUAL: 0 % (ref 0–1)
EOSINOPHIL # BLD AUTO: 0.12 THOUSAND/UL (ref 0–0.61)
EOSINOPHIL NFR BLD MANUAL: 2 % (ref 0–6)
ERYTHROCYTE [DISTWIDTH] IN BLOOD BY AUTOMATED COUNT: 17.9 % (ref 11.6–15.1)
HCT VFR BLD AUTO: 30.4 % (ref 34.8–46.1)
HGB BLD-MCNC: 9.8 G/DL (ref 11.5–15.4)
LYMPHOCYTES # BLD AUTO: 1.42 THOUSAND/UL (ref 0.6–4.47)
LYMPHOCYTES # BLD AUTO: 24 % (ref 14–44)
MCH RBC QN AUTO: 28.5 PG (ref 26.8–34.3)
MCHC RBC AUTO-ENTMCNC: 32.3 G/DL (ref 31.4–37.4)
MCV RBC AUTO: 88 FL (ref 82–98)
MONOCYTES # BLD AUTO: 0.35 THOUSAND/UL (ref 0–1.22)
MONOCYTES NFR BLD AUTO: 6 % (ref 4–12)
NEUTS BAND NFR BLD MANUAL: 1 % (ref 0–8)
NEUTS SEG # BLD: 4.01 THOUSAND/UL (ref 1.81–6.82)
NEUTS SEG NFR BLD AUTO: 67 % (ref 43–75)
PLATELET # BLD AUTO: 252 THOUSANDS/UL (ref 149–390)
PLATELET BLD QL SMEAR: ADEQUATE
PMV BLD AUTO: 7.2 FL (ref 8.9–12.7)
RBC # BLD AUTO: 3.44 MILLION/UL (ref 3.81–5.12)
TOTAL CELLS COUNTED SPEC: 100
WBC # BLD AUTO: 5.9 THOUSAND/UL (ref 4.31–10.16)
WBC NRBC COR # BLD: 5.9 THOUSAND/UL (ref 4.31–10.16)

## 2017-09-05 PROCEDURE — 85007 BL SMEAR W/DIFF WBC COUNT: CPT | Performed by: PHYSICIAN ASSISTANT

## 2017-09-05 PROCEDURE — 85027 COMPLETE CBC AUTOMATED: CPT | Performed by: PHYSICIAN ASSISTANT

## 2017-09-05 NOTE — PLAN OF CARE
Problem: Potential for Falls  Goal: Patient will remain free of falls  INTERVENTIONS:  - Assess patient frequently for physical needs  -  Identify cognitive and physical deficits and behaviors that affect risk of falls    -  [place] fall precautions as indicated by assessment   - Educate patient/family on patient safety including physical limitations  - Instruct patient to call for assistance with activity based on assessment  - Modify environment to reduce risk of injury  - Consider OT/PT consult to assist with strengthening/mobility   Outcome: Progressing

## 2017-09-05 NOTE — PROGRESS NOTES
Central labs drawn via port  Catheter maintenance performed per protocol  Pt aware of next port flush appointment   Refused AVS

## 2017-10-19 ENCOUNTER — GENERIC CONVERSION - ENCOUNTER (OUTPATIENT)
Dept: OTHER | Facility: OTHER | Age: 82
End: 2017-10-19

## 2017-10-30 ENCOUNTER — ALLSCRIPTS OFFICE VISIT (OUTPATIENT)
Dept: OTHER | Facility: OTHER | Age: 82
End: 2017-10-30

## 2017-10-30 DIAGNOSIS — R10.9 ABDOMINAL PAIN: ICD-10-CM

## 2017-10-30 LAB
BILIRUB UR QL STRIP: NEGATIVE
CLARITY UR: NORMAL
COLOR UR: YELLOW
GLUCOSE (HISTORICAL): NEGATIVE
HGB UR QL STRIP.AUTO: NEGATIVE
KETONES UR STRIP-MCNC: NEGATIVE MG/DL
LEUKOCYTE ESTERASE UR QL STRIP: NEGATIVE
NITRITE UR QL STRIP: NEGATIVE
PH UR STRIP.AUTO: 5.5 [PH]
PROT UR STRIP-MCNC: NEGATIVE MG/DL
SP GR UR STRIP.AUTO: 1.01
UROBILINOGEN UR QL STRIP.AUTO: 0.2

## 2017-10-31 ENCOUNTER — HOSPITAL ENCOUNTER (OUTPATIENT)
Dept: INFUSION CENTER | Facility: CLINIC | Age: 82
Discharge: HOME | End: 2017-10-31
Payer: MEDICARE

## 2017-10-31 NOTE — PROGRESS NOTES
Assessment  1  Urinary frequency (788 41) (R35 0)   2  Fatigue (780 79) (R53 83)   3  Myalgia (729 1) (M79 1)   4  Edema (782 3) (R60 9)   5  Abdominal pain of unknown etiology (789 00) (R10 9)   6  Need for vaccination (V05 9) (Z23)   7  Tinea corporis (110 5) (B35 4)    Plan  Abdominal pain of unknown etiology    · * US ABDOMEN COMPLETE; Status:Hold For - Scheduling; Requested for:30Oct2017;   Edema    · MetOLazone 2 5 MG Oral Tablet; TAKE 1 TABLET BY MOUTH TWICE WEEKLY  Edema, Fatigue, Myalgia, Urinary frequency    · (1) CBC/PLT/DIFF; Status:Active; Requested for:30Oct2017;    · (1) COMPREHENSIVE METABOLIC PANEL; Status:Active; Requested for:30Oct2017;    · (1) TSH WITH FT4 REFLEX; Status:Active; Requested for:30Oct2017;   Need for vaccination    · Fluzone High-Dose 0 5 ML Intramuscular Suspension Prefilled Syringe;  INJECT 0 5  ML Intramuscular; To Be Done: [id number]  Tinea corporis    · Clotrimazole-Betamethasone 1-0 05 % External Cream; APPLY  AND RUB  IN A  THIN FILM TO AFFECTED AREAS TWICE DAILY  (AM AND PM)    Discussion/Summary    Patient presents with her  today for multiple medical issues    pressure/frequency; exam is unremarkable today  Urine dip was negative today  I am uncertain to the etiology of her symptoms  Will continue to monitorfullness/bloating/history of? Ventral hernia: Patient states she had seen 2 surgeons approximately 5 years ago and told she had a large hernia but was not a good surgical candidate  Will sent for abdominal ultrasound  And possible Re eval by General surgerymyalgias, malaise[de-identified] Symptoms for 1-2 days  No fevers or chills  No respiratory symptoms  Urinalysis was negative  Will check labs today  Will call with resultsextremity edema: Chronic  Patient currently takes 80 mg furosemide daily  Will add Zaroxolyn 5 mg twice weekly for now   If symptoms persist, consider lower extremity arterial studies and possibly echocardiogramcorporis under breasts: Patient had been seen by dermatologist in past and prescribed ketoconazole cream without benefit  Will give Rx for Lotrisone cream to be used b i d  p r n  patient also advised to use baby powder frequently to keep area dry  shot todaycall with lab results and ultrasound results  Possible side effects of new medications were reviewed with the patient/guardian today  The treatment plan was reviewed with the patient/guardian  The patient/guardian understands and agrees with the treatment plan      Chief Complaint  Patient presents for rumbling in stomach where she had a hernia and states it is pushing on her organs  Patient also states that she is having problems with her leg and is requesting flu shot  Patient also brag urine specimen with her  History of Present Illness  HPI: Patient presents with her  today for multiple medical issues    day hx of urinary pressure  No fever or chillsmalaise, myalgias for the past day or soof lower extremity edema  Lately has been worseningof ventral abdominal hernia  Patient states that she has seen 2 general surgeons in the past 5 years who did not want to operate on her  Symptoms have persisted and lately gotten worse has rash under her breasts  Has been seen by Dermatology and prescribe ketoconazole cream without benefit       Review of Systems    Constitutional: feeling poorly-- and-- feeling tired, but-- as noted in HPI,-- no fever-- and-- no chills  ENT: no ear ache, no loss of hearing, no nosebleeds or nasal discharge, no sore throat or hoarseness  Cardiovascular: no complaints of slow or fast heart rate, no chest pain, no palpitations, no leg claudication or lower extremity edema  Respiratory: no complaints of shortness of breath, no wheezing, no dyspnea on exertion, no orthopnea or PND  Gastrointestinal: as noted in HPI  Genitourinary: as noted in HPI  Integumentary: as noted in HPI  Active Problems  1  Abdominal pain of unknown etiology (789 00) (R10 9)   2  Acute bronchitis (466 0) (J20 9)   3  Acute upper respiratory infection (465 9) (J06 9)   4  Ambulatory dysfunction (719 7) (R26 2)   5  Anemia of chronic disease (285 29) (D63 8)   6  Anxiety (300 00) (F41 9)   7  Arthritis (716 90) (M19 90)   8  Cataract, left (366 9) (H26 9)   9  Crohn's disease (555 9) (K50 90)   10  Depression (311) (F32 9)   11  Edema (782 3) (R60 9)   12  Elevated blood sugar (790 29) (R73 9)   13  Headache (784 0) (R51)   14  Neck pain on left side (723 1) (M54 2)   15  Need for vaccination with 13-polyvalent pneumococcal conjugate vaccine (V03 82) (Z23)   16  Pain of right upper extremity (729 5) (M79 601)   17  Physical debility (799 3) (R53 81)   18  Postherpetic neuralgia (053 19) (B02 29)   19  Spinal stenosis (724 00) (M48 00)    Past Medical History  1  History of Acute deep vein thrombosis of lower limb, unspecified laterality   2  History of Acute sinusitis (461 9) (J01 90)   3  History of Allergic rhinitis (477 9) (J30 9)   4  History of Anemia (285 9) (D64 9)   5  History of Blister of ankle (916 2) (S90 529A)   6  History of Cataract (366 9) (H26 9)   7  History of Cellulitis (682 9) (L03 90)   8  History of Cellulitis (682 9) (L03 90)   9  History of Cervical spinal stenosis (723 0) (M48 02)   10  History of Chronic fatigue and malaise (780 71) (R53 82,R53 81)   11  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)   12  History of Decubitus ulcer, unspecified pressure ulcer stage   13  History of Dysuria (788 1) (R30 0)   14  History of Dysuria (788 1) (R30 0)   15  History of Edema (782 3) (R60 9)   16  History of Encounter for screening mammogram for malignant neoplasm of breast    (V76 12) (Z12 31)   17  History of Flu vaccine need (V04 81) (Z23)   18  History of Gastritis (535 50) (K29 70)   19  History of Headache (784 0) (R51)   20  History of Healing Stage II Pressure Ulcer (707 22)   21  History of Herpes zoster (053 9) (B02 9)   22   History of acute bacterial sinusitis (V12 69) (Z87 09)   23  History of acute sinusitis (V12 69) (Z87 09)   24  History of dermatitis (V13 3) (Z87 2)   25  History of dermatitis (V13 3) (Z87 2)   26  History of nausea (V12 79) (Z87 898)   27  History of sinusitis (V12 69) (Z87 09)   28  History of spinal cord injury (V12 49) (Z87 828)   29  History of urinary frequency (V13 09) (Z87 898)   30  History of viral infection (V12 09) (Z86 19)   31  History of Injury Of The Cervical Spine (952 00)   32  History of Limb pain (729 5) (M79 609)   33  History of Limb swelling (729 81) (M79 89)   34  History of Lower extremity cellulitis (682 6) (L03 119)   35  History of Nausea (787 02) (R11 0)   36  History of Need for immunization against influenza (V04 81) (Z23)   37  History of Onychomycosis of toenail (110 1) (B35 1)   38  History of Open Wound Of Right Lower Leg (894 0)   39  History of Peripheral neuropathy (356 9) (G62 9)   40  History of PND (post-nasal drip) (784 91) (R09 82)   41  History of Postherpetic neuralgia (053 19) (B02 29)   42  History of Pre-operative cardiovascular examination (V72 81) (Z01 810)   43  History of Pre-operative clearance (V72 84) (Z01 818)   44  History of Pressure Ulcer Of The Left Buttock (707 05)   45  History of Pressure Ulcer Of The Right Buttock (707 05)   46  Spinal stenosis (724 00) (M48 00)   47  History of Streptococcal Septicemia (038 0)   48  History of Urinary incontinence (788 30) (R32)   49  History of Urinary urgency (788 63) (R39 15)  Active Problems And Past Medical History Reviewed: The active problems and past medical history were reviewed and updated today  Family History  Mother    1  Family history of Heart Disease (V17 49)   2  Family history of Mother  At Age 68  Father    3  Family history of Father  At Age 80   4  Family history of Stroke Syndrome (V17 1)  Maternal Grandfather    5  Family history of Colon Cancer (V16 0)  Family History    6   Family history of Allergies   7  Family history of Asthma (V17 5)   8  Family history of Eczema   9  Family history of Heart Disease (V17 49)   10  Family history of Stroke Syndrome (V17 1)    Social History   · Being A Social Drinker   · Denied: History of Drug Use   · Former smoker (V15 82) (Q72 436)   · Marital History - Currently   The social history was reviewed and updated today  The social history was reviewed and is unchanged  Surgical History  1  History of Arthroscopy Knee Left   2  History of Back Surgery   3  History of Complete Colonoscopy   4  History of Hand Surgery   5  History of Hysterectomy   6  History of Wrist Surgery    Current Meds   1  Biotin 1000 MCG Oral Tablet; Therapy: (Recorded:10Mar2014) to Recorded   2  Centrum Oral Tablet; Therapy: (Recorded:10Mar2014) to Recorded   3  Clotrimazole-Betamethasone 1-0 05 % External Cream; APPLY  AND RUB  IN A THIN   FILM TO AFFECTED AREAS TWICE DAILY  (AM AND PM); Therapy: 92Scg8804 to (Last Rx:94Kwu7677)  Requested for: 47Lzq6529 Ordered   4  Durezol 0 05 % Ophthalmic Emulsion; Therapy: 68YAK2314 to (Evaluate:14Jun2014) Recorded   5  Escitalopram Oxalate 5 MG Oral Tablet; TAKE 1 TABLET DAILY; Therapy: 22UIZ5921 to (Last XM:43WNY3117)  Requested for: 56ZXH6845 Ordered   6  Fish Oil 1200 MG Oral Capsule; Therapy: (Recorded:10Mar2014) to Recorded   7  Furosemide 20 MG Oral Tablet; take 2 tablet twice daily; Therapy: 15XRP4446 to (22 203717)  Requested for: 76SBW7370; Last   Rx:85Fcl5762 Ordered   8  Furosemide 40 MG Oral Tablet; Take 1 tablet twice daily  Requested for: 22ADM4130;   Last Rx:83Keh2218 Ordered   9  Gatifloxacin 0 5 % Ophthalmic Solution; Therapy: 28ZVZ1952 to (Evaluate:02Jun2014) Recorded   10  HM Iron TABS; Therapy: (Recorded:10Mar2014) to Recorded   11  Ilevro 0 3 % Ophthalmic Suspension; Therapy: 57QCO2138 to (VANFPMJE:92IVC6175) Recorded   12  Lialda 1 2 GM Oral Tablet Delayed Release;     Therapy: 08CXL8847 to (Evaluate:19Jun2014) Recorded   13  Lidoderm 5 % External Patch; APPLY 1 PATCH TO THE AFFECTED AREA AND LEAVE    IN PLACE FOR 12 HOURS, THEN REMOVE AND LEAVE OFF FOR 12 HOURS; Therapy: 00ZNM2262 to (Evaluate:36Fgi6851)  Requested for: 78Xaq6897; Last    ND:93YYI8658 Ordered   14  Lotemax 0 5 % Ophthalmic Suspension; Therapy: 19EDR8215 to (Evaluate:39Mda8964) Recorded   15  Methocarbamol 500 MG Oral Tablet; TAKE 2 TABLETS 4 TIMES DAILY Recorded   16  Nasonex 50 MCG/ACT Nasal Suspension; use 1 spray in each nostril twice daily; Therapy: 10MCO2518 to (Evaluate:03Jul2016)  Requested for: 58Hqo4567; Last    Rx:04Apr2016 Ordered   17  Nystatin-Triamcinolone 862263-0 1 UNIT/GM-% External Cream; APPLY SPARINGLY TO    AFFECTED AREA(S) TWICE DAILY; Therapy: 73YSH1935 to (Last Rx:28Nov2016)  Requested for: 86BKU1642 Ordered   18  Ocuvite Adult 50+ Oral Capsule; Therapy: (Recorded:10Mar2014) to Recorded   19  Omeprazole 20 MG Oral Capsule Delayed Release; Therapy: (Recorded:13Bzj1099) to Recorded   20  Ondansetron HCl - 8 MG Oral Tablet; Take 1 tablet daily; Therapy: 93OPT7069 to (Evaluate:19Mar2015)  Requested for: 50LTC6851; Last    Rx:20Crc3749 Ordered   21  Probiotic CAPS; Therapy: (Recorded:10Mar2014) to Recorded   22  Ventolin  (90 Base) MCG/ACT Inhalation Aerosol Solution; INHALE 2 PUFFS    FOUR TIMES DAILY AS DIRECTED; Therapy: 73FTW7701 to (Last Rx:06Mar2017)  Requested for: 31ZCB2123 Ordered   23  Vitamin B-12 TABS; Therapy: (Recorded:10Mar2014) to Recorded   24  Vitamin C 500 MG Oral Tablet; Therapy: (Recorded:10Mar2014) to Recorded   25  Vitamin D 400 UNIT TABS; Therapy: (Recorded:10Mar2014) to Recorded   26  Vitamin E-400 400 UNIT Oral Capsule; Therapy: (Recorded:10Mar2014) to Recorded    The medication list was reviewed and updated today  Allergies  1   Penicillins    Vitals   Recorded: 08DVH4524 01:31PM   Temperature 97 6 F, Tympanic   Heart Rate 72 Pulse Quality Normal   Respiration Quality Normal   Respiration 16   Systolic 473, RUE, Sitting   Diastolic 70, RUE, Sitting   Height Unobtainable Yes   Weight Unobtainable Yes   O2 Saturation 84, RA   Pain Scale 0     Physical Exam    Pulmonary   Respiratory effort: No increased work of breathing or signs of respiratory distress  Auscultation of lungs: Clear to auscultation  Cardiovascular   Palpation of heart: Normal PMI, no thrills  Auscultation of heart: Normal rate and rhythm, normal S1 and S2, without murmurs  Examination of extremities for edema and/or varicosities: Abnormal     Carotid pulses: Normal     Abdomen   Abdomen: Non-tender, no masses  Liver and spleen: No hepatomegaly or splenomegaly  Skin   Skin and subcutaneous tissue: Abnormal  -- Mild erythematous rash under breasts bilateral         Signatures   Electronically signed by :  Erika Ozuna DO; Oct 30 2017  2:14PM EST                       (Author)

## 2017-11-07 ENCOUNTER — APPOINTMENT (OUTPATIENT)
Dept: LAB | Facility: CLINIC | Age: 82
End: 2017-11-07
Payer: MEDICARE

## 2017-11-07 ENCOUNTER — HOSPITAL ENCOUNTER (OUTPATIENT)
Dept: INFUSION CENTER | Facility: CLINIC | Age: 82
Discharge: HOME | End: 2017-11-07
Payer: MEDICARE

## 2017-11-07 DIAGNOSIS — D63.8 ANEMIA IN OTHER CHRONIC DISEASES CLASSIFIED ELSEWHERE: ICD-10-CM

## 2017-11-07 DIAGNOSIS — R51.9 HEADACHE: ICD-10-CM

## 2017-11-07 DIAGNOSIS — M48.00 SPINAL STENOSIS: ICD-10-CM

## 2017-11-07 DIAGNOSIS — K50.90 CROHN'S DISEASE WITHOUT COMPLICATION (HCC): ICD-10-CM

## 2017-11-07 DIAGNOSIS — R73.9 HYPERGLYCEMIA: ICD-10-CM

## 2017-11-07 DIAGNOSIS — R60.9 EDEMA: ICD-10-CM

## 2017-11-07 LAB
ALBUMIN SERPL BCP-MCNC: 3.7 G/DL (ref 3.2–5)
ALP SERPL-CCNC: 58 U/L (ref 46–116)
ALT SERPL W P-5'-P-CCNC: 21 U/L (ref 12–78)
ANION GAP SERPL CALCULATED.3IONS-SCNC: 17 MMOL/L (ref 4–13)
ANISOCYTOSIS BLD QL SMEAR: PRESENT
AST SERPL W P-5'-P-CCNC: 26 U/L (ref 5–45)
BASOPHILS # BLD AUTO: 0 THOUSAND/UL (ref 0–0.1)
BASOPHILS NFR MAR MANUAL: 0 % (ref 0–1)
BILIRUB SERPL-MCNC: 0.5 MG/DL (ref 0.2–1)
BUN SERPL-MCNC: 19 MG/DL (ref 5–25)
CALCIUM SERPL-MCNC: 9.7 MG/DL (ref 8.3–10.1)
CHLORIDE SERPL-SCNC: 92 MMOL/L (ref 98–108)
CO2 SERPL-SCNC: 31 MMOL/L (ref 21–32)
CREAT SERPL-MCNC: 0.9 MG/DL (ref 0.6–1.3)
EOSINOPHIL # BLD AUTO: 0.11 THOUSAND/UL (ref 0–0.61)
EOSINOPHIL NFR BLD MANUAL: 2 % (ref 0–6)
ERYTHROCYTE [DISTWIDTH] IN BLOOD BY AUTOMATED COUNT: 18.5 % (ref 11.6–15.1)
GFR SERPL CREATININE-BSD FRML MDRD: 60 ML/MIN/1.73SQ M
GLUCOSE SERPL-MCNC: 90 MG/DL (ref 65–140)
HCT VFR BLD AUTO: 32.9 % (ref 34.8–46.1)
HGB BLD-MCNC: 10.3 G/DL (ref 11.5–15.4)
LYMPHOCYTES # BLD AUTO: 0.99 THOUSAND/UL (ref 0.6–4.47)
LYMPHOCYTES # BLD AUTO: 18 % (ref 14–44)
MCH RBC QN AUTO: 29.2 PG (ref 26.8–34.3)
MCHC RBC AUTO-ENTMCNC: 31.2 G/DL (ref 31.4–37.4)
MCV RBC AUTO: 94 FL (ref 82–98)
MONOCYTES # BLD AUTO: 0.22 THOUSAND/UL (ref 0–1.22)
MONOCYTES NFR BLD AUTO: 4 % (ref 4–12)
NEUTS BAND NFR BLD MANUAL: 6 % (ref 0–8)
NEUTS SEG # BLD: 4.18 THOUSAND/UL (ref 1.81–6.82)
NEUTS SEG NFR BLD AUTO: 70 % (ref 43–75)
PLATELET # BLD AUTO: 273 THOUSANDS/UL (ref 149–390)
PLATELET BLD QL SMEAR: ADEQUATE
PMV BLD AUTO: 7.4 FL (ref 8.9–12.7)
POTASSIUM SERPL-SCNC: 3.6 MMOL/L (ref 3.5–5.3)
PROT SERPL-MCNC: 7.3 G/DL (ref 6.4–8.2)
RBC # BLD AUTO: 3.52 MILLION/UL (ref 3.81–5.12)
SODIUM SERPL-SCNC: 140 MMOL/L (ref 136–145)
TOTAL CELLS COUNTED SPEC: 100
TSH SERPL DL<=0.05 MIU/L-ACNC: 1.8 UIU/ML (ref 0.36–3.74)
WBC # BLD AUTO: 5.5 THOUSAND/UL (ref 4.31–10.16)
WBC NRBC COR # BLD: 5.5 THOUSAND/UL (ref 4.31–10.16)

## 2017-11-07 PROCEDURE — 85007 BL SMEAR W/DIFF WBC COUNT: CPT

## 2017-11-07 PROCEDURE — 80053 COMPREHEN METABOLIC PANEL: CPT

## 2017-11-07 PROCEDURE — 84443 ASSAY THYROID STIM HORMONE: CPT

## 2017-11-07 PROCEDURE — 85027 COMPLETE CBC AUTOMATED: CPT

## 2017-11-07 NOTE — PLAN OF CARE
Problem: Potential for Falls  Goal: Patient will remain free of falls  INTERVENTIONS:  - Assess patient frequently for physical needs  -  Identify cognitive and physical deficits and behaviors that affect risk of falls    -  [place] fall precautions as indicated by assessment   - Educate patient/family on patient safety including physical limitations  - Instruct patient to call for assistance with activity based on assessment  - Modify environment to reduce risk of injury  - Consider OT/PT consult to assist with strengthening/mobility   Outcome: Progressing

## 2017-12-04 ENCOUNTER — ALLSCRIPTS OFFICE VISIT (OUTPATIENT)
Dept: OTHER | Facility: OTHER | Age: 82
End: 2017-12-04

## 2017-12-06 NOTE — PROGRESS NOTES
Assessment    1  Dermatitis (692 9) (L30 9)    Plan  Dermatitis    · Hydrocortisone 2 5 % External Cream; APPLY 2-3 TIMES DAILY TO AFFECTEDAREA(S)    Discussion/Summary    --Dermatitis: Patient presents with spot on her right forearm that is itchy and red  She had a shave biopsy performed by [facility] 3 weeks ago  Results were negative for malignancy  Upon examination, there is a 1 cm erythematous spot on her right forearm without any peripheral erythema or tenderness  I will treat for inflammation, as I do not see any evidence of cellulitis or wound infection  Will give Rx for hydrocortisone cream 2 5% to be used 3 times a day  I asked patient to call me should redness increase  She was also advised to follow up with me in 1 week if not resolved  Keep follow-up with Derm next month  Possible side effects of new medications were reviewed with the patient/guardian today  The treatment plan was reviewed with the patient/guardian  The patient/guardian understands and agrees with the treatment plan      Chief Complaint  Patient presents with c/o a red area on right forearm where she had a biopsy done a few weeks ago  She stated that the area is itchy and does not seem to be healing  History of Present Illness  HPI: Patient presents with spot on her right forearm that is itchy and red  She had a shave biopsy performed by [facility] 3 weeks ago  Results were negative for malignancy  Review of Systems   Constitutional: No fever, no chills, feels well, no tiredness, no recent weight gain or loss  Integumentary: rash-- and-- itching, but-- as noted in HPI  Active Problems  1  Abdominal pain of unknown etiology (789 00) (R10 9)   2  Ambulatory dysfunction (719 7) (R26 2)   3  Anemia of chronic disease (285 29) (D63 8)   4  Anxiety (300 00) (F41 9)   5  Arthritis (716 90) (M19 90)   6  Cataract, left (366 9) (H26 9)   7  Crohn's disease (555 9) (K50 90)   8  Depression (311) (F32 9)   9   Edema (782  3) (R60 9)   10  Elevated blood sugar (790 29) (R73 9)   11  Fatigue (780 79) (R53 83)   12  Headache (784 0) (R51)   13  Myalgia (729 1) (M79 1)   14  Neck pain on left side (723 1) (M54 2)   15  Pain of right upper extremity (729 5) (M79 601)   16  Physical debility (799 3) (R53 81)   17  Postherpetic neuralgia (053 19) (B02 29)   18  Spinal stenosis (724 00) (M48 00)   19  Tinea corporis (110 5) (B35 4)   20  Urinary frequency (788 41) (R35 0)    Past Medical History    1  History of Acute deep vein thrombosis of lower limb, unspecified laterality   2  History of Acute sinusitis (461 9) (J01 90)   3  History of Allergic rhinitis (477 9) (J30 9)   4  History of Anemia (285 9) (D64 9)   5  History of Blister of ankle (916 2) (S90 529A)   6  History of Cataract (366 9) (H26 9)   7  History of Cellulitis (682 9) (L03 90)   8  History of Cellulitis (682 9) (L03 90)   9  History of Cervical spinal stenosis (723 0) (M48 02)   10  History of Chronic fatigue and malaise (780 71) (R53 82,R53 81)   11  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)   12  History of Decubitus ulcer, unspecified pressure ulcer stage   13  History of Dysuria (788 1) (R30 0)   14  History of Dysuria (788 1) (R30 0)   15  History of Edema (782 3) (R60 9)   16  History of Encounter for screening mammogram for malignant neoplasm of breast  (V76 12) (Z12 31)   17  History of Flu vaccine need (V04 81) (Z23)   18  History of Gastritis (535 50) (K29 70)   19  History of Headache (784 0) (R51)   20  History of Healing Stage II Pressure Ulcer (707 22)   21  History of Herpes zoster (053 9) (B02 9)   22  History of acute bacterial sinusitis (V12 69) (Z87 09)   23  History of acute sinusitis (V12 69) (Z87 09)   24  History of dermatitis (V13 3) (Z87 2)   25  History of dermatitis (V13 3) (Z87 2)   26  History of nausea (V12 79) (Z87 898)   27  History of sinusitis (V12 69) (Z87 09)   28   History of spinal cord injury (V12 49) (Z87 828)   29  History of urinary frequency (V13 09) (Z87 898)   30  History of viral infection (V12 09) (Z86 19)   31  History of Injury Of The Cervical Spine (952 00)   32  History of Limb pain (729 5) (M79 609)   33  History of Limb swelling (729 81) (M79 89)   34  History of Lower extremity cellulitis (682 6) (L03 119)   35  History of Nausea (787 02) (R11 0)   36  History of Need for immunization against influenza (V04 81) (Z23)   37  History of Onychomycosis of toenail (110 1) (B35 1)   38  History of Open Wound Of Right Lower Leg (894 0)   39  History of Peripheral neuropathy (356 9) (G62 9)   40  History of PND (post-nasal drip) (784 91) (R09 82)   41  History of Postherpetic neuralgia (053 19) (B02 29)   42  History of Pre-operative cardiovascular examination (V72 81) (Z01 810)   43  History of Pre-operative clearance (V72 84) (Z01 818)   44  History of Pressure Ulcer Of The Left Buttock (707 05)   45  History of Pressure Ulcer Of The Right Buttock (707 05)   46  Spinal stenosis (724 00) (M48 00)   47  History of Streptococcal Septicemia (038 0)   48  History of Urinary incontinence (788 30) (R32)   49  History of Urinary urgency (788 63) (R39 15)  Active Problems And Past Medical History Reviewed: The active problems and past medical history were reviewed and updated today  Family History  Mother    1  Family history of Heart Disease (V17 49)   2  Family history of Mother  At Age 68  Father    3  Family history of Father  At Age 80   4  Family history of Stroke Syndrome (V17 1)  Maternal Grandfather    5  Family history of Colon Cancer (V16 0)  Family History    6  Family history of Allergies   7  Family history of Asthma (V17 5)   8  Family history of Eczema   9  Denied: Family history of substance abuse   10  Family history of Heart Disease (V17 49)   11  Denied: Family history of Mental problem   12   Family history of Stroke Syndrome (V17 1)    Social History   · Being A Social Drinker   · Denied: History of Drug Use   · Former smoker (N90 35) (J09 180)   · Marital History - Currently   The social history was reviewed and updated today  The social history was reviewed and is unchanged  Surgical History    1  History of Arthroscopy Knee Left   2  History of Back Surgery   3  History of Complete Colonoscopy   4  History of Hand Surgery   5  History of Hysterectomy   6  History of Wrist Surgery    Current Meds   1  Biotin 1000 MCG Oral Tablet; Therapy: (Recorded:10Mar2014) to Recorded   2  Centrum Oral Tablet; Therapy: (Recorded:10Mar2014) to Recorded   3  Clotrimazole-Betamethasone 1-0 05 % External Cream; APPLY  AND RUB  IN A THIN FILM TO AFFECTED AREAS TWICE DAILY  (AM AND PM); Therapy: 41Gxr5134 to (Last Rx:32Ttq6387)  Requested for: 93Vaf6616 Ordered   4  Clotrimazole-Betamethasone 1-0 05 % External Cream; APPLY  AND RUB  IN A THIN FILM TO AFFECTED AREAS TWICE DAILY  (AM AND PM); Therapy: 74GQJ0114 to (Last Rx:30Oct2017)  Requested for: 69MHM4407 Ordered   5  Durezol 0 05 % Ophthalmic Emulsion; Therapy: 02XLK9721 to (Evaluate:14Jun2014) Recorded   6  Escitalopram Oxalate 5 MG Oral Tablet; TAKE 1 TABLET DAILY; Therapy: 15NUF5273 to (Last PQ:51FZE0989)  Requested for: 28FKM8050 Ordered   7  Fish Oil 1200 MG Oral Capsule; Therapy: (Recorded:10Mar2014) to Recorded   8  Furosemide 20 MG Oral Tablet; take 2 tablet twice daily; Therapy: 96NKD8516 to (21 )  Requested for: 52VXV0882; Last Rx:68Lco9186 Ordered   9  Furosemide 40 MG Oral Tablet; Take 1 tablet twice daily  Requested for: 07GJN1583; Last Rx:23Lek9157 Ordered   10  Gatifloxacin 0 5 % Ophthalmic Solution; Therapy: 94HXU6400 to (Evaluate:02Jun2014) Recorded   11  HM Iron TABS; Therapy: (Recorded:10Mar2014) to Recorded   12  Ilevro 0 3 % Ophthalmic Suspension; Therapy: 97JMM0206 to (WNESXLDF:13IBT8829) Recorded   13  Lialda 1 2 GM Oral Tablet Delayed Release;   Therapy: 53NNI9414 to (Evaluate:19Jun2014) Recorded   14  Lidoderm 5 % External Patch; APPLY 1 PATCH TO THE AFFECTED AREA AND LEAVE  IN PLACE FOR 12 HOURS, THEN REMOVE AND LEAVE OFF FOR 12 HOURS; Therapy: 88EKF8104 to (Evaluate:10Bcq1298)  Requested for: 76Ozp3150; Last  PO:43BUS2958 Ordered   15  Lotemax 0 5 % Ophthalmic Suspension; Therapy: 01MTN3829 to (Evaluate:58Dxu8629) Recorded   16  Methocarbamol 500 MG Oral Tablet; TAKE 2 TABLETS 4 TIMES DAILY Recorded   17  MetOLazone 2 5 MG Oral Tablet; TAKE 1 TABLET BY MOUTH TWICE WEEKLY; Therapy: 07MUM7826 to (Last Rx:30Oct2017)  Requested for: 03Uvz7872 Ordered   18  Nasonex 50 MCG/ACT Nasal Suspension; use 1 spray in each nostril twice daily; Therapy: 21SYE5006 to (Evaluate:44Wnt9789)  Requested for: 13Yaz6906; Last  Rx:89Dlr2261 Ordered   19  Nystatin-Triamcinolone 237544-4 1 UNIT/GM-% External Cream; APPLY SPARINGLY TO  AFFECTED AREA(S) TWICE DAILY; Therapy: 08HGT9448 to (Last Rx:28Nov2016)  Requested for: 56LNF6881 Ordered   20  Ocuvite Adult 50+ Oral Capsule; Therapy: (Recorded:10Mar2014) to Recorded   21  Omeprazole 20 MG Oral Capsule Delayed Release; Therapy: (Recorded:58Skh7717) to Recorded   22  Ondansetron HCl - 8 MG Oral Tablet; Take 1 tablet daily; Therapy: 73OAI5565 to (Evaluate:19Mar2015)  Requested for: 47NTZ4935; Last  Rx:25Oxz1693 Ordered   23  Probiotic CAPS; Therapy: (Recorded:10Mar2014) to Recorded   24  Ventolin  (90 Base) MCG/ACT Inhalation Aerosol Solution; INHALE 2 PUFFS  FOUR TIMES DAILY AS DIRECTED; Therapy: 64XOB5478 to (Last Rx:06Mar2017)  Requested for: 64YGZ7441 Ordered   25  Vitamin B-12 TABS; Therapy: (Recorded:10Mar2014) to Recorded   26  Vitamin C 500 MG Oral Tablet; Therapy: (Recorded:10Mar2014) to Recorded   27  Vitamin D 400 UNIT TABS; Therapy: (Recorded:10Mar2014) to Recorded   28  Vitamin E-400 400 UNIT Oral Capsule; Therapy: (Recorded:10Mar2014) to Recorded    The medication list was reviewed and updated today  Allergies  1  Penicillins    Vitals   Recorded: 20YMW6615 04:24PM   Temperature 98 2 F, Tympanic   Heart Rate 78   Pulse Quality Normal   Systolic 748, LUE, Sitting   Diastolic 72, LUE, Sitting   Height Unobtainable Yes   Weight Unobtainable Yes   O2 Saturation 97, RA       Physical Exam   Constitutional  General appearance: No acute distress, well appearing and well nourished  Skin  Skin and subcutaneous tissue: Abnormal  -- 1 cm erythem patch R forearm  Signatures   Electronically signed by :  Cody Miller DO; Dec  4 2017  4:46PM EST                       (Author)

## 2017-12-15 ENCOUNTER — HOSPITAL ENCOUNTER (OUTPATIENT)
Dept: INFUSION CENTER | Facility: CLINIC | Age: 82
Discharge: HOME | End: 2017-12-15
Payer: MEDICARE

## 2017-12-15 LAB
ANISOCYTOSIS BLD QL SMEAR: PRESENT
BASOPHILS # BLD AUTO: 0 THOUSAND/UL (ref 0–0.1)
BASOPHILS NFR MAR MANUAL: 0 % (ref 0–1)
EOSINOPHIL # BLD AUTO: 0.21 THOUSAND/UL (ref 0–0.61)
EOSINOPHIL NFR BLD MANUAL: 4 % (ref 0–6)
ERYTHROCYTE [DISTWIDTH] IN BLOOD BY AUTOMATED COUNT: 16.1 % (ref 11.6–15.1)
HCT VFR BLD AUTO: 33.5 % (ref 34.8–46.1)
HGB BLD-MCNC: 10.8 G/DL (ref 11.5–15.4)
LG PLATELETS BLD QL SMEAR: PRESENT
LYMPHOCYTES # BLD AUTO: 0.9 THOUSAND/UL (ref 0.6–4.47)
LYMPHOCYTES # BLD AUTO: 17 % (ref 14–44)
MCH RBC QN AUTO: 29.5 PG (ref 26.8–34.3)
MCHC RBC AUTO-ENTMCNC: 32.3 G/DL (ref 31.4–37.4)
MCV RBC AUTO: 91 FL (ref 82–98)
MONOCYTES # BLD AUTO: 0.21 THOUSAND/UL (ref 0–1.22)
MONOCYTES NFR BLD AUTO: 4 % (ref 4–12)
NEUTS BAND NFR BLD MANUAL: 1 % (ref 0–8)
NEUTS SEG # BLD: 3.98 THOUSAND/UL (ref 1.81–6.82)
NEUTS SEG NFR BLD AUTO: 74 % (ref 43–75)
OVALOCYTES BLD QL SMEAR: PRESENT
PLATELET # BLD AUTO: 249 THOUSANDS/UL (ref 149–390)
PLATELET BLD QL SMEAR: ADEQUATE
PMV BLD AUTO: 7.8 FL (ref 8.9–12.7)
RBC # BLD AUTO: 3.68 MILLION/UL (ref 3.81–5.12)
TOTAL CELLS COUNTED SPEC: 100
WBC # BLD AUTO: 5.3 THOUSAND/UL (ref 4.31–10.16)
WBC NRBC COR # BLD: 5.3 THOUSAND/UL (ref 4.31–10.16)

## 2017-12-15 PROCEDURE — 85027 COMPLETE CBC AUTOMATED: CPT | Performed by: INTERNAL MEDICINE

## 2017-12-15 PROCEDURE — 85007 BL SMEAR W/DIFF WBC COUNT: CPT | Performed by: INTERNAL MEDICINE

## 2017-12-15 NOTE — PLAN OF CARE
Problem: Potential for Falls  Goal: Patient will remain free of falls  INTERVENTIONS:  - Assess patient frequently for physical needs  -  Identify cognitive and physical deficits and behaviors that affect risk of falls    -  [place] fall precautions as indicated by assessment   - Educate patient/family on patient safety including physical limitations  - Instruct patient to call for assistance with activity based on assessment  - Modify environment to reduce risk of injury  - Consider OT/PT consult to assist with strengthening/mobility   Outcome: Progressing

## 2018-01-10 NOTE — RESULT NOTES
Verified Results  (1) CBC/PLT/DIFF 72QCH1174 12:00AM Yogesh Le Order Number: [id number]     Order Number: [id number]     Test Name Result Flag Reference   WBC COUNT 5 68 Thousand/uL  4 31-10 16   RBC COUNT 3 70 Million/uL L 3 81-5 12   HEMOGLOBIN 10 6 g/dL L 11 5-15 4   HEMATOCRIT 33 9 % L 34 8-46  1   MCV 92 fL  82-98   MCH 28 6 pg  26 8-34 3   MCHC 31 3 g/dL L 31 4-37 4   RDW 17 3 % H 11 6-15 1   MPV 10 6 fL  8 9-12 7   PLATELET COUNT 069 Thousands/uL  149-390   nRBC AUTOMATED 0 /100 WBCs     NEUTROPHILS RELATIVE PERCENT 72 %  43-75   LYMPHOCYTES RELATIVE PERCENT 17 %  14-44   MONOCYTES RELATIVE PERCENT 7 %  4-12   EOSINOPHILS RELATIVE PERCENT 4 %  0-6   BASOPHILS RELATIVE PERCENT 0 %  0-1   NEUTROPHILS ABSOLUTE COUNT 4 05 Thousands/µL  1 85-7 62   LYMPHOCYTES ABSOLUTE COUNT 0 97 Thousands/µL  0 60-4 47   MONOCYTES ABSOLUTE COUNT 0 42 Thousand/µL  0 17-1 22   EOSINOPHILS ABSOLUTE COUNT 0 21 Thousand/µL  0 00-0 61   BASOPHILS ABSOLUTE COUNT 0 01 Thousands/µL  0 00-0 10     (1) COMPREHENSIVE METABOLIC PANEL 57GNG1012 26:50HE [facility] Kidney Disease Education Program recommendations are as follows:  GFR calculation is accurate only with a steady state creatinine  Chronic Kidney disease less than 60 ml/min/1 73 sq  meters  Kidney failure less than 15 ml/min/1 73 sq  meters  Test Name Result Flag Reference   GLUCOSE,RANDM 89 mg/dL     If the patient is fasting, the ADA then defines impaired fasting glucose as > 100 mg/dL and diabetes as > or equal to 123 mg/dL     SODIUM 141 mmol/L  136-145   POTASSIUM 4 1 mmol/L  3 5-5 3   CHLORIDE 103 mmol/L  100-108   CARBON DIOXIDE 33 mmol/L H 21-32   ANION GAP (CALC) 5 mmol/L  4-13   BLOOD UREA NITROGEN 15 mg/dL  5-25   CREATININE 0 60 mg/dL  0 60-1 30   Standardized to IDMS reference method   CALCIUM 9 3 mg/dL  8 3-10 1   BILI, TOTAL 0 30 mg/dL  0 20-1 00   ALK PHOSPHATAS 79 U/L     ALT (SGPT) 22 U/L  12-78   AST(SGOT) 15 U/L 5-45   ALBUMIN 3 5 g/dL  3 5-5 0   TOTAL PROTEIN 7 4 g/dL  6 4-8 2   eGFR Non-African American      >60 0 ml/min/1 73sq m

## 2018-01-11 NOTE — PROGRESS NOTES
History of Present Illness  Care Coordination Encounter Information:   Type of Encounter: Telephonic    Spoke to Patient   CC spoke with pt  reports she continues with chronic lower back pain, due to spinal cord injury in 1998  Pt  has morphine pump which is filled every 6 weeks by pain management, also uses Lidoderm patch   and aid assists with transfers/ADL's, uses electric w/c independently  Care Coordination SL Nurse Miguel Little:   The reason for call is to discuss outreach for follow up/needed services  Active Problems    1  Abdominal pain of unknown etiology (789 00) (R10 9)   2  Acute bronchitis (466 0) (J20 9)   3  Acute upper respiratory infection (465 9) (J06 9)   4  Ambulatory dysfunction (719 7) (R26 2)   5  Anemia of chronic disease (285 29) (D63 8)   6  Anxiety (300 00) (F41 9)   7  Arthritis (716 90) (M19 90)   8  Cataract, left (366 9) (H26 9)   9  Crohn's disease (555 9) (K50 90)   10  Depression (311) (F32 9)   11  Edema (782 3) (R60 9)   12  Elevated blood sugar (790 29) (R73 9)   13  Headache (784 0) (R51)   14  Neck pain on left side (723 1) (M54 2)   15  Need for vaccination with 13-polyvalent pneumococcal conjugate vaccine (V03 82) (Z23)   16  Pain of right upper extremity (729 5) (M79 601)   17  Physical debility (799 3) (R53 81)   18  Postherpetic neuralgia (053 19) (B02 29)   19  Spinal stenosis (724 00) (M48 00)    Past Medical History    1  History of Acute deep vein thrombosis of lower limb, unspecified laterality   2  History of Acute sinusitis (461 9) (J01 90)   3  History of Allergic rhinitis (477 9) (J30 9)   4  History of Anemia (285 9) (D64 9)   5  History of Blister of ankle (916 2) (S90 529A)   6  History of Cataract (366 9) (H26 9)   7  History of Cellulitis (682 9) (L03 90)   8  History of Cellulitis (682 9) (L03 90)   9  History of Cervical spinal stenosis (723 0) (M48 02)   10  History of Chronic fatigue and malaise (780 71) (R53 82,R53 81)   11   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)   12  History of Decubitus ulcer, unspecified pressure ulcer stage   13  History of Dysuria (788 1) (R30 0)   14  History of Dysuria (788 1) (R30 0)   15  History of Edema (782 3) (R60 9)   16  History of Encounter for screening mammogram for malignant neoplasm of breast    (V76 12) (Z12 31)   17  History of Flu vaccine need (V04 81) (Z23)   18  History of Gastritis (535 50) (K29 70)   19  History of Headache (784 0) (R51)   20  History of Healing Stage II Pressure Ulcer (707 22)   21  History of Herpes zoster (053 9) (B02 9)   22  History of acute bacterial sinusitis (V12 69) (Z87 09)   23  History of acute sinusitis (V12 69) (Z87 09)   24  History of dermatitis (V13 3) (Z87 2)   25  History of dermatitis (V13 3) (Z87 2)   26  History of nausea (V12 79) (Z87 898)   27  History of sinusitis (V12 69) (Z87 09)   28  History of spinal cord injury (V12 49) (Z87 828)   29  History of urinary frequency (V13 09) (Z87 898)   30  History of viral infection (V12 09) (Z86 19)   31  History of Injury Of The Cervical Spine (952 00)   32  History of Limb pain (729 5) (M79 609)   33  History of Limb swelling (729 81) (M79 89)   34  History of Lower extremity cellulitis (682 6) (L03 119)   35  History of Nausea (787 02) (R11 0)   36  History of Need for immunization against influenza (V04 81) (Z23)   37  History of Onychomycosis of toenail (110 1) (B35 1)   38  History of Open Wound Of Right Lower Leg (894 0)   39  History of Peripheral neuropathy (356 9) (G62 9)   40  History of PND (post-nasal drip) (784 91) (R09 82)   41  History of Postherpetic neuralgia (053 19) (B02 29)   42  History of Pre-operative cardiovascular examination (V72 81) (Z01 810)   43  History of Pre-operative clearance (V72 84) (Z01 818)   44  History of Pressure Ulcer Of The Left Buttock (707 05)   45  History of Pressure Ulcer Of The Right Buttock (707 05)   46  Spinal stenosis (724 00) (M48 00)   47  History of Streptococcal Septicemia (038 0)   48  History of Urinary incontinence (788 30) (R32)   49  History of Urinary urgency (788 63) (R39 15)    Surgical History    1  History of Arthroscopy Knee Left   2  History of Back Surgery   3  History of Complete Colonoscopy   4  History of Hand Surgery   5  History of Hysterectomy   6  History of Wrist Surgery    Family History  Mother    1  Family history of Heart Disease (V17 49)   2  Family history of Mother  At Age 68  Father    3  Family history of Father  At Age 80   4  Family history of Stroke Syndrome (V17 1)  Maternal Grandfather    5  Family history of Colon Cancer (V16 0)  Family History    6  Family history of Allergies   7  Family history of Asthma (V17 5)   8  Family history of Eczema   9  Family history of Heart Disease (V17 49)   10  Family history of Stroke Syndrome (V17 1)    Social History    · Being A Social Drinker   · Denied: History of Drug Use   · Former smoker (V15 82) (J74 128)   · Marital History - Currently     Current Meds    1  Ventolin  (90 Base) MCG/ACT Inhalation Aerosol Solution; INHALE 2 PUFFS   FOUR TIMES DAILY AS DIRECTED; Therapy: 34ZGB1207 to (Last Rx:2017)  Requested for: 04UEX9772 Ordered    2  Escitalopram Oxalate 5 MG Oral Tablet (Lexapro); TAKE 1 TABLET DAILY; Therapy: 82DOD8639 to (Last DY:07TPS3355)  Requested for: 87LEH7082 Ordered    3  Methocarbamol 500 MG Oral Tablet; TAKE 2 TABLETS 4 TIMES DAILY Recorded    4  Furosemide 20 MG Oral Tablet; take 2 tablet twice daily; Therapy: 33MPE9389 to ( 73 93 30)  Requested for: 84DLQ6918; Last   Rx:76Xws7899 Ordered    5  Furosemide 40 MG Oral Tablet (Lasix); Take 1 tablet twice daily  Requested for:   62MQE3484; Last Rx:83Aew1186 Ordered    6  Clotrimazole-Betamethasone 1-0 05 % External Cream; APPLY  AND RUB  IN A THIN   FILM TO AFFECTED AREAS TWICE DAILY  (AM AND PM);    Therapy: 62JDL9234 to (Last Rx:02Dur2952)  Requested for: 21JPG8617 Ordered   7  Nystatin-Triamcinolone 979681-4 1 UNIT/GM-% External Cream; APPLY SPARINGLY TO   AFFECTED AREA(S) TWICE DAILY; Therapy: 66PYD1027 to (Last Rx:28Nov2016)  Requested for: 54QOZ0510 Ordered    8  Ondansetron HCl - 8 MG Oral Tablet; Take 1 tablet daily; Therapy: 04IZZ6375 to (Evaluate:19Mar2015)  Requested for: 04IFH8280; Last   Rx:23Yqc8165 Ordered    9  Nasonex 50 MCG/ACT Nasal Suspension (Mometasone Furoate); use 1 spray in each   nostril twice daily; Therapy: 27OMY4479 to (Evaluate:03Jul2016)  Requested for: 04Apr2016; Last   Rx:04Apr2016 Ordered    10  Lidoderm 5 % External Patch (Lidocaine); APPLY 1 PATCH TO THE AFFECTED AREA    AND LEAVE IN PLACE FOR 12 HOURS, THEN REMOVE AND LEAVE OFF FOR 12    HOURS; Therapy: 10QIY8009 to (Evaluate:30Kgy5604)  Requested for: 35Ptj8231; Last    ED:58VKF1799 Ordered    11  Biotin 1000 MCG Oral Tablet; Therapy: (Recorded:10Mar2014) to Recorded   12  Centrum Oral Tablet; Therapy: (Recorded:10Mar2014) to Recorded   13  Durezol 0 05 % Ophthalmic Emulsion; Therapy: 31NGB1915 to (Evaluate:14Jun2014) Recorded   14  Fish Oil 1200 MG Oral Capsule; Therapy: (Recorded:10Mar2014) to Recorded   15  Gatifloxacin 0 5 % Ophthalmic Solution; Therapy: 55ULT7182 to (Evaluate:02Jun2014) Recorded   16  HM Iron TABS; Therapy: (Recorded:10Mar2014) to Recorded   17  Ilevro 0 3 % Ophthalmic Suspension; Therapy: 45KEJ2045 to (BTIIFXHV:33ISM9739) Recorded   18  Lialda 1 2 GM Oral Tablet Delayed Release (Mesalamine); Therapy: 02ERR0758 to (Evaluate:19Jun2014) Recorded   19  Lotemax 0 5 % Ophthalmic Suspension; Therapy: 22FFP8653 to (Evaluate:15Mri4007) Recorded   20  Ocuvite Adult 50+ Oral Capsule; Therapy: (Recorded:10Mar2014) to Recorded   21  Omeprazole 20 MG Oral Capsule Delayed Release; Therapy: (Recorded:53Gho2126) to Recorded   22  Probiotic CAPS; Therapy: (Recorded:10Mar2014) to Recorded   23  Vitamin B-12 TABS;     Therapy: (Recorded:10Mar2014) to Recorded   24  Vitamin C 500 MG Oral Tablet; Therapy: (Recorded:10Mar2014) to Recorded   25  Vitamin D 400 UNIT TABS; Therapy: (Recorded:10Mar2014) to Recorded   26  Vitamin E-400 400 UNIT Oral Capsule; Therapy: (Recorded:10Mar2014) to Recorded    Allergies    1  Penicillins    Health Management   *VB - Foot Exam; every 10 weeks; Last 15Apr2015; Next Due: 83YFG2290; Overdue    End of Encounter Meds    1  Ventolin  (90 Base) MCG/ACT Inhalation Aerosol Solution; INHALE 2 PUFFS   FOUR TIMES DAILY AS DIRECTED; Therapy: 53RLT8774 to (Last Rx:06Mar2017)  Requested for: 09BRY9178 Ordered    2  Escitalopram Oxalate 5 MG Oral Tablet (Lexapro); TAKE 1 TABLET DAILY; Therapy: 09QRO0461 to (Last ON:81CSN1567)  Requested for: 91TOK4970 Ordered    3  Methocarbamol 500 MG Oral Tablet; TAKE 2 TABLETS 4 TIMES DAILY Recorded    4  Furosemide 20 MG Oral Tablet; take 2 tablet twice daily; Therapy: 13MJB0257 to (77 873 135)  Requested for: 57JAZ5391; Last   Rx:35Pef0397 Ordered    5  Furosemide 40 MG Oral Tablet (Lasix); Take 1 tablet twice daily  Requested for:   52UTI9155; Last Rx:28Rlw7233 Ordered    6  Clotrimazole-Betamethasone 1-0 05 % External Cream; APPLY  AND RUB  IN A THIN   FILM TO AFFECTED AREAS TWICE DAILY  (AM AND PM); Therapy: 15Auf7032 to (Last Rx:98Znf5219)  Requested for: 03Xyx9521 Ordered   7  Nystatin-Triamcinolone 172095-7 1 UNIT/GM-% External Cream; APPLY SPARINGLY TO   AFFECTED AREA(S) TWICE DAILY; Therapy: 02GKF7233 to (Last Rx:28Nov2016)  Requested for: 11PIB4848 Ordered    8  Ondansetron HCl - 8 MG Oral Tablet; Take 1 tablet daily; Therapy: 55JMO0549 to (Evaluate:19Mar2015)  Requested for: 14RUM1793; Last   Rx:54Iwr8224 Ordered    9  Nasonex 50 MCG/ACT Nasal Suspension (Mometasone Furoate); use 1 spray in each   nostril twice daily; Therapy: 32IEW7169 to (Evaluate:14Lmq1867)  Requested for: 04Apr2016; Last   Rx:04Apr2016 Ordered    10   Lidoderm 5 % External Patch (Lidocaine); APPLY 1 PATCH TO THE AFFECTED AREA    AND LEAVE IN PLACE FOR 12 HOURS, THEN REMOVE AND LEAVE OFF FOR 12    HOURS; Therapy: 34JVF7788 to (Evaluate:30Bdh3058)  Requested for: 51Doi7866; Last    WB:72IBO1259 Ordered    11  Biotin 1000 MCG Oral Tablet; Therapy: (Recorded:10Mar2014) to Recorded   12  Centrum Oral Tablet; Therapy: (Recorded:10Mar2014) to Recorded   13  Durezol 0 05 % Ophthalmic Emulsion; Therapy: 66UTH0521 to (Evaluate:14Jun2014) Recorded   14  Fish Oil 1200 MG Oral Capsule; Therapy: (Recorded:10Mar2014) to Recorded   15  Gatifloxacin 0 5 % Ophthalmic Solution; Therapy: 58QUQ5288 to (Evaluate:02Jun2014) Recorded   16  HM Iron TABS; Therapy: (Recorded:10Mar2014) to Recorded   17  Ilevro 0 3 % Ophthalmic Suspension; Therapy: 04NMO0535 to (QXETPWBB:72NNT2113) Recorded   18  Lialda 1 2 GM Oral Tablet Delayed Release (Mesalamine); Therapy: 79IXS1287 to (Evaluate:19Jun2014) Recorded   19  Lotemax 0 5 % Ophthalmic Suspension; Therapy: 39MQC3541 to (Evaluate:36Uyw2087) Recorded   20  Ocuvite Adult 50+ Oral Capsule; Therapy: (Recorded:10Mar2014) to Recorded   21  Omeprazole 20 MG Oral Capsule Delayed Release; Therapy: (Recorded:10Raw7606) to Recorded   22  Probiotic CAPS; Therapy: (Recorded:10Mar2014) to Recorded   23  Vitamin B-12 TABS; Therapy: (Recorded:10Mar2014) to Recorded   24  Vitamin C 500 MG Oral Tablet; Therapy: (Recorded:10Mar2014) to Recorded   25  Vitamin D 400 UNIT TABS; Therapy: (Recorded:10Mar2014) to Recorded   26  Vitamin E-400 400 UNIT Oral Capsule;     Therapy: (Recorded:10Mar2014) to Recorded    Patient Care Team    Care Team Member Role Specialty Office Number   Stephanie Wetzel [facility]  Vascular Surgery [phone]     Signatures   Electronically signed by : Heidi Doyle RN; Sep  5 2017  2:15PM EST                       (Author)

## 2018-01-12 VITALS
SYSTOLIC BLOOD PRESSURE: 110 MMHG | HEART RATE: 72 BPM | DIASTOLIC BLOOD PRESSURE: 68 MMHG | RESPIRATION RATE: 16 BRPM | TEMPERATURE: 97.6 F

## 2018-01-12 VITALS
TEMPERATURE: 97.6 F | RESPIRATION RATE: 16 BRPM | SYSTOLIC BLOOD PRESSURE: 110 MMHG | OXYGEN SATURATION: 84 % | DIASTOLIC BLOOD PRESSURE: 70 MMHG | HEART RATE: 72 BPM

## 2018-01-12 NOTE — PROGRESS NOTES
History of Present Illness  Care Coordination Encounter Information:   Type of Encounter: Telephonic    Spoke to Patient   CC spoke with pt  reports she continues with chronic lower back pain, due to spinal cord injury in 1998  Pt  has morphine pump which is filled every 6 weeks by pain management   and aid assists with transfers/ADL's, uses electric w/c independently, spends time in her recliner  Denies any rectal bleeding   attempted to make appt  for both of them for flu shot in office, unable to get appt  for both of them, he will call back again for a later date  Pt  will speak with PCP in reference to increase leg/knee pain, wants to know if she is a candidate for stem cell? CM contact information for any future questions or concerns  Care Coordination  Nurse Eugene Delgado:   The reason for call is to discuss outreach for follow up/needed services  Active Problems    1  Abdominal pain of unknown etiology (789 00) (R10 9)   2  Acute bronchitis (466 0) (J20 9)   3  Acute upper respiratory infection (465 9) (J06 9)   4  Ambulatory dysfunction (719 7) (R26 2)   5  Anemia of chronic disease (285 29) (D63 8)   6  Anxiety (300 00) (F41 9)   7  Arthritis (716 90) (M19 90)   8  Cataract, left (366 9) (H26 9)   9  Crohn's disease (555 9) (K50 90)   10  Depression (311) (F32 9)   11  Edema (782 3) (R60 9)   12  Elevated blood sugar (790 29) (R73 9)   13  Headache (784 0) (R51)   14  Neck pain on left side (723 1) (M54 2)   15  Need for vaccination with 13-polyvalent pneumococcal conjugate vaccine (V03 82) (Z23)   16  Pain of right upper extremity (729 5) (M79 601)   17  Physical debility (799 3) (R53 81)   18  Postherpetic neuralgia (053 19) (B02 29)   19  Spinal stenosis (724 00) (M48 00)    Past Medical History    1  History of Acute deep vein thrombosis of lower limb, unspecified laterality   2  History of Acute sinusitis (461 9) (J01 90)   3  History of Allergic rhinitis (477 9) (J30 9)   4  History of Anemia (285 9) (D64 9)   5  History of Blister of ankle (916 2) (S90 529A)   6  History of Cataract (366 9) (H26 9)   7  History of Cellulitis (682 9) (L03 90)   8  History of Cellulitis (682 9) (L03 90)   9  History of Cervical spinal stenosis (723 0) (M48 02)   10  History of Chronic fatigue and malaise (780 71) (R53 82,R53 81)   11  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)   12  History of Decubitus ulcer, unspecified pressure ulcer stage   13  History of Dysuria (788 1) (R30 0)   14  History of Dysuria (788 1) (R30 0)   15  History of Edema (782 3) (R60 9)   16  History of Encounter for screening mammogram for malignant neoplasm of breast    (V76 12) (Z12 31)   17  History of Flu vaccine need (V04 81) (Z23)   18  History of Gastritis (535 50) (K29 70)   19  History of Headache (784 0) (R51)   20  History of Healing Stage II Pressure Ulcer (707 22)   21  History of Herpes zoster (053 9) (B02 9)   22  History of acute bacterial sinusitis (V12 69) (Z87 09)   23  History of acute sinusitis (V12 69) (Z87 09)   24  History of dermatitis (V13 3) (Z87 2)   25  History of dermatitis (V13 3) (Z87 2)   26  History of nausea (V12 79) (Z87 898)   27  History of sinusitis (V12 69) (Z87 09)   28  History of spinal cord injury (V12 49) (Z87 828)   29  History of urinary frequency (V13 09) (Z87 898)   30  History of viral infection (V12 09) (Z86 19)   31  History of Injury Of The Cervical Spine (952 00)   32  History of Limb pain (729 5) (M79 609)   33  History of Limb swelling (729 81) (M79 89)   34  History of Lower extremity cellulitis (682 6) (L03 119)   35  History of Nausea (787 02) (R11 0)   36  History of Need for immunization against influenza (V04 81) (Z23)   37  History of Onychomycosis of toenail (110 1) (B35 1)   38  History of Open Wound Of Right Lower Leg (894 0)   39  History of Peripheral neuropathy (356 9) (G62 9)   40  History of PND (post-nasal drip) (784 91) (R09 82)   41  History of Postherpetic neuralgia (053 19) (B02 29)   42  History of Pre-operative cardiovascular examination (V72 81) (Z01 810)   43  History of Pre-operative clearance (V72 84) (Z01 818)   44  History of Pressure Ulcer Of The Left Buttock (707 05)   45  History of Pressure Ulcer Of The Right Buttock (707 05)   46  Spinal stenosis (724 00) (M48 00)   47  History of Streptococcal Septicemia (038 0)   48  History of Urinary incontinence (788 30) (R32)   49  History of Urinary urgency (788 63) (R39 15)    Surgical History    1  History of Arthroscopy Knee Left   2  History of Back Surgery   3  History of Complete Colonoscopy   4  History of Hand Surgery   5  History of Hysterectomy   6  History of Wrist Surgery    Family History  Mother    1  Family history of Heart Disease (V17 49)   2  Family history of Mother  At Age 68  Father    3  Family history of Father  At Age 80   4  Family history of Stroke Syndrome (V17 1)  Maternal Grandfather    5  Family history of Colon Cancer (V16 0)  Family History    6  Family history of Allergies   7  Family history of Asthma (V17 5)   8  Family history of Eczema   9  Family history of Heart Disease (V17 49)   10  Family history of Stroke Syndrome (V17 1)    Social History    · Being A Social Drinker   · Denied: History of Drug Use   · Former smoker (V15 82) (N03 861)   · Marital History - Currently     Current Meds    1  Ventolin  (90 Base) MCG/ACT Inhalation Aerosol Solution; INHALE 2 PUFFS   FOUR TIMES DAILY AS DIRECTED; Therapy: 62PRY1349 to (Last Rx:2017)  Requested for: 82SSS9327 Ordered    2  Escitalopram Oxalate 5 MG Oral Tablet (Lexapro); TAKE 1 TABLET DAILY; Therapy: 43PLY3679 to (Last PC:45KGK2461)  Requested for: 79JSS9773 Ordered    3  Methocarbamol 500 MG Oral Tablet; TAKE 2 TABLETS 4 TIMES DAILY Recorded    4  Furosemide 20 MG Oral Tablet; take 2 tablet twice daily;    Therapy: 99EBS9963 to (96 624882)  Requested for: 93HLC8517; Last   Rx:34Eyz8148 Ordered    5  Furosemide 40 MG Oral Tablet (Lasix); Take 1 tablet twice daily  Requested for:   04TNF5080; Last Rx:53Wgh6008 Ordered    6  Clotrimazole-Betamethasone 1-0 05 % External Cream; APPLY  AND RUB  IN A THIN   FILM TO AFFECTED AREAS TWICE DAILY  (AM AND PM); Therapy: 35Dxc8384 to (Last Rx:05Giz6758)  Requested for: 99Hen3109 Ordered   7  Nystatin-Triamcinolone 195075-6 1 UNIT/GM-% External Cream; APPLY SPARINGLY TO   AFFECTED AREA(S) TWICE DAILY; Therapy: 34LIO0623 to (Last Rx:28Nov2016)  Requested for: 76DOQ6347 Ordered    8  Ondansetron HCl - 8 MG Oral Tablet; Take 1 tablet daily; Therapy: 67IKH3159 to (Evaluate:19Mar2015)  Requested for: 39AER4252; Last   Rx:71Dex1736 Ordered    9  Nasonex 50 MCG/ACT Nasal Suspension (Mometasone Furoate); use 1 spray in each   nostril twice daily; Therapy: 77RNC7785 to (Evaluate:00Tfj8495)  Requested for: 77Soi2120; Last   Rx:75Loe4204 Ordered    10  Lidoderm 5 % External Patch (Lidocaine); APPLY 1 PATCH TO THE AFFECTED AREA    AND LEAVE IN PLACE FOR 12 HOURS, THEN REMOVE AND LEAVE OFF FOR 12    HOURS; Therapy: 38KMU0159 to (Evaluate:17Jor0403)  Requested for: 53Gko5714; Last    MQ:82NTZ9188 Ordered    11  Biotin 1000 MCG Oral Tablet; Therapy: (Recorded:10Mar2014) to Recorded   12  Centrum Oral Tablet; Therapy: (Recorded:10Mar2014) to Recorded   13  Durezol 0 05 % Ophthalmic Emulsion; Therapy: 15FUH2593 to (Evaluate:14Jun2014) Recorded   14  Fish Oil 1200 MG Oral Capsule; Therapy: (Recorded:10Mar2014) to Recorded   15  Gatifloxacin 0 5 % Ophthalmic Solution; Therapy: 12AHA5946 to (Evaluate:02Jun2014) Recorded   16  HM Iron TABS; Therapy: (Recorded:10Mar2014) to Recorded   17  Ilevro 0 3 % Ophthalmic Suspension; Therapy: 80JWO2378 to (DYVHKUVC:11YZP6463) Recorded   18  Lialda 1 2 GM Oral Tablet Delayed Release (Mesalamine); Therapy: 71ZVT5115 to (Evaluate:19Jun2014) Recorded   19   Lotemax 0 5 % Ophthalmic Suspension; Therapy: 60MPN5183 to (Evaluate:41Ame5002) Recorded   20  Ocuvite Adult 50+ Oral Capsule; Therapy: (Recorded:10Mar2014) to Recorded   21  Omeprazole 20 MG Oral Capsule Delayed Release; Therapy: (Recorded:63Mye1702) to Recorded   22  Probiotic CAPS; Therapy: (Recorded:10Mar2014) to Recorded   23  Vitamin B-12 TABS; Therapy: (Recorded:10Mar2014) to Recorded   24  Vitamin C 500 MG Oral Tablet; Therapy: (Recorded:10Mar2014) to Recorded   25  Vitamin D 400 UNIT TABS; Therapy: (Recorded:10Mar2014) to Recorded   26  Vitamin E-400 400 UNIT Oral Capsule; Therapy: (Recorded:10Mar2014) to Recorded    Allergies    1  Penicillins    Health Management   *VB - Foot Exam; every 10 weeks; Last 15Apr2015; Next Due: 49DHG2406; Overdue    End of Encounter Meds    1  Ventolin  (90 Base) MCG/ACT Inhalation Aerosol Solution; INHALE 2 PUFFS   FOUR TIMES DAILY AS DIRECTED; Therapy: 88JKS2384 to (Last Rx:06Mar2017)  Requested for: 59OAP0443 Ordered    2  Escitalopram Oxalate 5 MG Oral Tablet (Lexapro); TAKE 1 TABLET DAILY; Therapy: 20NYC6194 to (Last BU:41HXK1456)  Requested for: 52CEC2896 Ordered    3  Methocarbamol 500 MG Oral Tablet; TAKE 2 TABLETS 4 TIMES DAILY Recorded    4  Furosemide 20 MG Oral Tablet; take 2 tablet twice daily; Therapy: 09CNT3929 to [phone])  Requested for: 95LSW5764; Last   Rx:25Ltz1063 Ordered    5  Furosemide 40 MG Oral Tablet (Lasix); Take 1 tablet twice daily  Requested for:   83IGN0174; Last Rx:33Xxr7934 Ordered    6  Clotrimazole-Betamethasone 1-0 05 % External Cream; APPLY  AND RUB  IN A THIN   FILM TO AFFECTED AREAS TWICE DAILY  (AM AND PM); Therapy: 86Tbw7365 to (Last Rx:53Fom1803)  Requested for: 28Dlw7778 Ordered   7  Nystatin-Triamcinolone 881875-1 1 UNIT/GM-% External Cream; APPLY SPARINGLY TO   AFFECTED AREA(S) TWICE DAILY; Therapy: 67JLG0635 to (Last Rx:28Nov2016)  Requested for: 75KVQ5440 Ordered    8   Ondansetron HCl - 8 MG Oral Tablet; Take 1 tablet daily; Therapy: 72RXA8457 to (Evaluate:19Mar2015)  Requested for: 88UMA8365; Last   Rx:82Kcs7042 Ordered    9  Nasonex 50 MCG/ACT Nasal Suspension (Mometasone Furoate); use 1 spray in each   nostril twice daily; Therapy: 84HZI4734 to (Evaluate:52Zln6238)  Requested for: 42Tlw4976; Last   Rx:05Hnp3438 Ordered    10  Lidoderm 5 % External Patch (Lidocaine); APPLY 1 PATCH TO THE AFFECTED AREA    AND LEAVE IN PLACE FOR 12 HOURS, THEN REMOVE AND LEAVE OFF FOR 12    HOURS; Therapy: 49INR1328 to (Evaluate:68Yux4135)  Requested for: 88Txg5652; Last    QC:19AOI4196 Ordered    11  Biotin 1000 MCG Oral Tablet; Therapy: (Recorded:10Mar2014) to Recorded   12  Centrum Oral Tablet; Therapy: (Recorded:10Mar2014) to Recorded   13  Durezol 0 05 % Ophthalmic Emulsion; Therapy: 48WZU2998 to (Evaluate:14Jun2014) Recorded   14  Fish Oil 1200 MG Oral Capsule; Therapy: (Recorded:10Mar2014) to Recorded   15  Gatifloxacin 0 5 % Ophthalmic Solution; Therapy: 57VAB0718 to (Evaluate:02Jun2014) Recorded   16  HM Iron TABS; Therapy: (Recorded:10Mar2014) to Recorded   17  Ilevro 0 3 % Ophthalmic Suspension; Therapy: 59NGF3271 to (SZSUFQPK:50VPA1040) Recorded   18  Lialda 1 2 GM Oral Tablet Delayed Release (Mesalamine); Therapy: 25BON2730 to (Evaluate:19Jun2014) Recorded   19  Lotemax 0 5 % Ophthalmic Suspension; Therapy: 84LMD9986 to (Evaluate:29Qsu4211) Recorded   20  Ocuvite Adult 50+ Oral Capsule; Therapy: (Recorded:10Mar2014) to Recorded   21  Omeprazole 20 MG Oral Capsule Delayed Release; Therapy: (Recorded:80Xma3524) to Recorded   22  Probiotic CAPS; Therapy: (Recorded:10Mar2014) to Recorded   23  Vitamin B-12 TABS; Therapy: (Recorded:10Mar2014) to Recorded   24  Vitamin C 500 MG Oral Tablet; Therapy: (Recorded:10Mar2014) to Recorded   25  Vitamin D 400 UNIT TABS; Therapy: (Recorded:10Mar2014) to Recorded   26  Vitamin E-400 400 UNIT Oral Capsule;     Therapy: (Recorded:10Mar2014) to Recorded    Patient Care Team    Care Team Member Role Specialty Office Number   Marisa [facility]  Vascular Surgery [phone]     Signatures   Electronically signed by : Scarlette Shone, RN; Oct 19 2017 12:06PM EST                       (Author)

## 2018-01-14 VITALS
DIASTOLIC BLOOD PRESSURE: 60 MMHG | OXYGEN SATURATION: 91 % | SYSTOLIC BLOOD PRESSURE: 104 MMHG | TEMPERATURE: 98 F | HEART RATE: 74 BPM

## 2018-01-14 VITALS
OXYGEN SATURATION: 93 % | RESPIRATION RATE: 15 BRPM | SYSTOLIC BLOOD PRESSURE: 112 MMHG | DIASTOLIC BLOOD PRESSURE: 64 MMHG | TEMPERATURE: 97.3 F | HEART RATE: 75 BPM

## 2018-01-15 NOTE — PROGRESS NOTES
History of Present Illness  Care Coordination Encounter Information:   Type of Encounter: Telephonic   Contact: Initial Contact    Spoke to Patient   Initial conversation with pt  following d/c SLA with dx: rectal bleed  Pt  alert, pleasant, agreed to participate BPCI program  Pt  complaining of continued chronic low back pain from spinal cord injury in 1998, she has a morphine pump which gets filled every 6 weeks, also uses Lidoderm patch to control pain, sees pain management as scheduled  Denies any further rectal bleeding, has not yet scheduled any follow up appointments, does not feel up to it at this time, CC reminded pt  of appts  needed to be made  Agreed to another outreach  CC unable to give pt  contact information, pt  laying in recliner unable to get up by self  Care Coordination  Nurse [address] [place] [address] [address]:   The reason for call is to discuss outreach for follow up/needed services  Active Problems    1  Abdominal pain of unknown etiology (789 00) (R10 9)   2  Acute bronchitis (466 0) (J20 9)   3  Acute upper respiratory infection (465 9) (J06 9)   4  Ambulatory dysfunction (719 7) (R26 2)   5  Anemia of chronic disease (285 29) (D63 8)   6  Anxiety (300 00) (F41 9)   7  Arthritis (716 90) (M19 90)   8  Cataract, left (366 9) (H26 9)   9  Crohn's disease (555 9) (K50 90)   10  Depression (311) (F32 9)   11  Edema (782 3) (R60 9)   12  Headache (784 0) (R51)   13  Neck pain on left side (723 1) (M54 2)   14  Need for vaccination with 13-polyvalent pneumococcal conjugate vaccine (V03 82) (Z23)   15  Pain of right upper extremity (729 5) (M79 601)   16  Physical debility (799 3) (R53 81)   17  Postherpetic neuralgia (053 19) (B02 29)   18  Spinal stenosis (724 00) (M48 00)    Past Medical History    1  History of Acute deep vein thrombosis of lower limb, unspecified laterality   2  History of Acute sinusitis (461 9) (J01 90)   3  History of Allergic rhinitis (477 9) (J30 9)   4   History of Anemia (285 9) (D64 9)   5  History of Blister of ankle (916 2) (S90 529A)   6  History of Cataract (366 9) (H26 9)   7  History of Cellulitis (682 9) (L03 90)   8  History of Cellulitis (682 9) (L03 90)   9  History of Cervical spinal stenosis (723 0) (M48 02)   10  History of Chronic fatigue and malaise (780 71) (R53 82,R53 81)   11  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)   12  History of Decubitus ulcer, unspecified pressure ulcer stage   13  History of Dysuria (788 1) (R30 0)   14  History of Dysuria (788 1) (R30 0)   15  History of Edema (782 3) (R60 9)   16  History of Encounter for screening mammogram for malignant neoplasm of breast    (V76 12) (Z12 31)   17  History of Flu vaccine need (V04 81) (Z23)   18  History of Gastritis (535 50) (K29 70)   19  History of Headache (784 0) (R51)   20  History of Healing Stage II Pressure Ulcer (707 22)   21  History of Herpes zoster (053 9) (B02 9)   22  History of acute bacterial sinusitis (V12 69) (Z87 09)   23  History of acute sinusitis (V12 69) (Z87 09)   24  History of dermatitis (V13 3) (Z87 2)   25  History of dermatitis (V13 3) (Z87 2)   26  History of nausea (V12 79) (Z87 898)   27  History of sinusitis (V12 69) (Z87 09)   28  History of spinal cord injury (V12 49) (Z87 828)   29  History of urinary frequency (V13 09) (Z87 898)   30  History of viral infection (V12 09) (Z86 19)   31  History of Injury Of The Cervical Spine (952 00)   32  History of Limb pain (729 5) (M79 609)   33  History of Limb swelling (729 81) (M79 89)   34  History of Lower extremity cellulitis (682 6) (L03 119)   35  History of Nausea (787 02) (R11 0)   36  History of Need for immunization against influenza (V04 81) (Z23)   37  History of Onychomycosis of toenail (110 1) (B35 1)   38  History of Open Wound Of Right Lower Leg (894 0)   39  History of Peripheral neuropathy (356 9) (G62 9)   40  History of PND (post-nasal drip) (784 91) (R09 82)   41   History of Postherpetic neuralgia (053 19) (B02 29)   42  History of Pre-operative cardiovascular examination (V72 81) (Z01 810)   43  History of Pre-operative clearance (V72 84) (Z01 818)   44  History of Pressure Ulcer Of The Left Buttock (707 05)   45  History of Pressure Ulcer Of The Right Buttock (707 05)   46  Spinal stenosis (724 00) (M48 00)   47  History of Streptococcal Septicemia (038 0)   48  History of Urinary incontinence (788 30) (R32)   49  History of Urinary urgency (788 63) (R39 15)    Surgical History    1  History of Arthroscopy Knee Left   2  History of Back Surgery   3  History of Complete Colonoscopy   4  History of Hand Surgery   5  History of Hysterectomy   6  History of Wrist Surgery    Family History  Mother    1  Family history of Heart Disease (V17 49)   2  Family history of Mother  At Age 68  Father    3  Family history of Father  At Age 80   4  Family history of Stroke Syndrome (V17 1)  Maternal Grandfather    5  Family history of Colon Cancer (V16 0)  Family History    6  Family history of Allergies   7  Family history of Asthma (V17 5)   8  Family history of Eczema   9  Family history of Heart Disease (V17 49)   10  Family history of Stroke Syndrome (V17 1)    Social History    · Being A Social Drinker   · Denied: History of Drug Use   · Former smoker (V15 82) (L60 176)   · Marital History - Currently     Current Meds    1  Ventolin  (90 Base) MCG/ACT Inhalation Aerosol Solution; INHALE 2 PUFFS   FOUR TIMES DAILY AS DIRECTED; Therapy: 04DVD6560 to (Last Rx:2017)  Requested for: 78CJK6789 Ordered    2  Escitalopram Oxalate 5 MG Oral Tablet (Lexapro); TAKE 1 TABLET DAILY; Therapy: 91QEF0498 to (Last EP:91KFV1840)  Requested for: 68FKC3506 Ordered    3  Methocarbamol 500 MG Oral Tablet; TAKE 2 TABLETS 4 TIMES DAILY Recorded    4  Furosemide 20 MG Oral Tablet; take 2 tablet twice daily;    Therapy: 99BPJ0527 to [phone]  Requested for: 40NFY6134; Last Rx: 58QNT3832 Ordered    5  Furosemide 40 MG Oral Tablet (Lasix); Take 1 tablet twice daily  Requested for:   38MCN4496; Last Rx:17Zsl3392 Ordered    6  Clotrimazole-Betamethasone 1-0 05 % External Cream; APPLY  AND RUB  IN A THIN   FILM TO AFFECTED AREAS TWICE DAILY  (AM AND PM); Therapy: 69Hxd1984 to (Last Rx:12Cgj0060)  Requested for: 19Wst3764 Ordered   7  Nystatin-Triamcinolone 811434-3 1 UNIT/GM-% External Cream; APPLY SPARINGLY TO   AFFECTED AREA(S) TWICE DAILY; Therapy: 75DNN6757 to (Last Rx:30Ezo2601)  Requested for: 49PUQ9313 Ordered    8  Ondansetron HCl - 8 MG Oral Tablet; Take 1 tablet daily; Therapy: 70EWD3355 to (Evaluate:19Mar2015)  Requested for: 44GAP4595; Last   Rx:96Kjd8296 Ordered    9  Nasonex 50 MCG/ACT Nasal Suspension (Mometasone Furoate); use 1 spray in each   nostril twice daily; Therapy: 54FDF7775 to (Evaluate:03Yqh3822)  Requested for: 57Cjt4830; Last   Rx:79Ekd3780 Ordered    10  Lidoderm 5 % External Patch (Lidocaine); APPLY 1 PATCH TO THE AFFECTED AREA    AND LEAVE IN PLACE FOR 12 HOURS, THEN REMOVE AND LEAVE OFF FOR 12    HOURS; Therapy: 09DVO1764 to (Evaluate:70Jjj4081)  Requested for: 34Mpu3231; Last    UI:94PRP3468 Ordered    11  Biotin 1000 MCG Oral Tablet; Therapy: (Recorded:10Mar2014) to Recorded   12  Centrum Oral Tablet; Therapy: (Recorded:10Mar2014) to Recorded   13  Durezol 0 05 % Ophthalmic Emulsion; Therapy: 72BIE7644 to (Evaluate:14Jun2014) Recorded   14  Fish Oil 1200 MG Oral Capsule; Therapy: (Recorded:10Mar2014) to Recorded   15  Gatifloxacin 0 5 % Ophthalmic Solution; Therapy: 34XVH9563 to (Evaluate:02Jun2014) Recorded   16  HM Iron TABS; Therapy: (Recorded:10Mar2014) to Recorded   17  Ilevro 0 3 % Ophthalmic Suspension; Therapy: 30DRP1475 to (VRSEUBHE:01OSU1499) Recorded   18  Lialda 1 2 GM Oral Tablet Delayed Release (Mesalamine); Therapy: 19TEJ7262 to (Evaluate:19Jun2014) Recorded   19   Lotemax 0 5 % Ophthalmic Suspension; Therapy: 59AKK2092 to (Evaluate:10Whf0708) Recorded   20  Ocuvite Adult 50+ Oral Capsule; Therapy: (Recorded:10Mar2014) to Recorded   21  Omeprazole 20 MG Oral Capsule Delayed Release; Therapy: (Recorded:28Hrw0242) to Recorded   22  Probiotic CAPS; Therapy: (Recorded:10Mar2014) to Recorded   23  Vitamin B-12 TABS; Therapy: (Recorded:10Mar2014) to Recorded   24  Vitamin C 500 MG Oral Tablet; Therapy: (Recorded:10Mar2014) to Recorded   25  Vitamin D 400 UNIT TABS; Therapy: (Recorded:10Mar2014) to Recorded   26  Vitamin E-400 400 UNIT Oral Capsule; Therapy: (Recorded:10Mar2014) to Recorded    Allergies    1  Penicillins    Health Management   *VB - Foot Exam; every 10 weeks; Last 15Apr2015; Next Due: 76EEP5629; Overdue    End of Encounter Meds    1  Ventolin  (90 Base) MCG/ACT Inhalation Aerosol Solution; INHALE 2 PUFFS   FOUR TIMES DAILY AS DIRECTED; Therapy: 19RCW5752 to (Last Rx:06Mar2017)  Requested for: 07TJU3701 Ordered    2  Escitalopram Oxalate 5 MG Oral Tablet (Lexapro); TAKE 1 TABLET DAILY; Therapy: 44XFD7805 to (Last NAJMA:03NAC6634)  Requested for: 29PLG8951 Ordered    3  Methocarbamol 500 MG Oral Tablet; TAKE 2 TABLETS 4 TIMES DAILY Recorded    4  Furosemide 20 MG Oral Tablet; take 2 tablet twice daily; Therapy: 69ABV7001 to [phone])  Requested for: 71MUF0317; Last   Rx:70Lgv5202 Ordered    5  Furosemide 40 MG Oral Tablet (Lasix); Take 1 tablet twice daily  Requested for:   68SHI1549; Last Rx:83Ean4581 Ordered    6  Clotrimazole-Betamethasone 1-0 05 % External Cream; APPLY  AND RUB  IN A THIN   FILM TO AFFECTED AREAS TWICE DAILY  (AM AND PM); Therapy: 26Ufq0429 to (Last Rx:40Hsk2070)  Requested for: 95Koq8994 Ordered   7  Nystatin-Triamcinolone 901809-8 1 UNIT/GM-% External Cream; APPLY SPARINGLY TO   AFFECTED AREA(S) TWICE DAILY; Therapy: 77QAL2427 to (Last Rx:28Nov2016)  Requested for: 57AGU0517 Ordered    8  Ondansetron HCl - 8 MG Oral Tablet;  Take 1 tablet daily; Therapy: 79KQZ5035 to (Evaluate:19Mar2015)  Requested for: 24WFS1963; Last   Rx:91Iwj4764 Ordered    9  Nasonex 50 MCG/ACT Nasal Suspension (Mometasone Furoate); use 1 spray in each   nostril twice daily; Therapy: 94OJK2321 to (Evaluate:09Zjk9419)  Requested for: 94Ctd7685; Last   Rx:50Uko8376 Ordered    10  Lidoderm 5 % External Patch (Lidocaine); APPLY 1 PATCH TO THE AFFECTED AREA    AND LEAVE IN PLACE FOR 12 HOURS, THEN REMOVE AND LEAVE OFF FOR 12    HOURS; Therapy: 29CZM2584 to (Evaluate:16Pke5086)  Requested for: 99Nhd3785; Last    OA:73MFX7107 Ordered    11  Biotin 1000 MCG Oral Tablet; Therapy: (Recorded:10Mar2014) to Recorded   12  Centrum Oral Tablet; Therapy: (Recorded:10Mar2014) to Recorded   13  Durezol 0 05 % Ophthalmic Emulsion; Therapy: 09DPA8277 to (Evaluate:14Jun2014) Recorded   14  Fish Oil 1200 MG Oral Capsule; Therapy: (Recorded:10Mar2014) to Recorded   15  Gatifloxacin 0 5 % Ophthalmic Solution; Therapy: 57SMD7743 to (Evaluate:02Jun2014) Recorded   16  HM Iron TABS; Therapy: (Recorded:10Mar2014) to Recorded   17  Ilevro 0 3 % Ophthalmic Suspension; Therapy: 15AQV1721 to (WPGSLTEN:21URK9985) Recorded   18  Lialda 1 2 GM Oral Tablet Delayed Release (Mesalamine); Therapy: 99ASE6944 to (Evaluate:19Jun2014) Recorded   19  Lotemax 0 5 % Ophthalmic Suspension; Therapy: 11TDS8481 to (Evaluate:54Yjb0322) Recorded   20  Ocuvite Adult 50+ Oral Capsule; Therapy: (Recorded:10Mar2014) to Recorded   21  Omeprazole 20 MG Oral Capsule Delayed Release; Therapy: (Recorded:65Eks2288) to Recorded   22  Probiotic CAPS; Therapy: (Recorded:10Mar2014) to Recorded   23  Vitamin B-12 TABS; Therapy: (Recorded:10Mar2014) to Recorded   24  Vitamin C 500 MG Oral Tablet; Therapy: (Recorded:10Mar2014) to Recorded   25  Vitamin D 400 UNIT TABS; Therapy: (Recorded:10Mar2014) to Recorded   26  Vitamin E-400 400 UNIT Oral Capsule;     Therapy: (Recorded:10Mar2014) to Recorded    Patient Care Team    Care Team Member Role Specialty Office Number   [facility]  Vascular Surgery [phone]     Signatures   Electronically signed by : Robin Gtz RN; Aug 17 2017 10:56AM EST                       (Author)

## 2018-01-22 VITALS
DIASTOLIC BLOOD PRESSURE: 72 MMHG | TEMPERATURE: 98.2 F | OXYGEN SATURATION: 97 % | SYSTOLIC BLOOD PRESSURE: 122 MMHG | HEART RATE: 78 BPM

## 2018-01-23 ENCOUNTER — HOSPITAL ENCOUNTER (OUTPATIENT)
Dept: INFUSION CENTER | Facility: CLINIC | Age: 83
Discharge: HOME | End: 2018-01-23

## 2018-01-30 ENCOUNTER — HOSPITAL ENCOUNTER (OUTPATIENT)
Dept: INFUSION CENTER | Facility: CLINIC | Age: 83
Discharge: HOME | End: 2018-01-30
Payer: MEDICARE

## 2018-01-30 LAB
ANISOCYTOSIS BLD QL SMEAR: PRESENT
BASOPHILS # BLD AUTO: 0 THOUSAND/UL (ref 0–0.1)
BASOPHILS NFR MAR MANUAL: 0 % (ref 0–1)
EOSINOPHIL # BLD AUTO: 0.12 THOUSAND/UL (ref 0–0.61)
EOSINOPHIL NFR BLD MANUAL: 2 % (ref 0–6)
ERYTHROCYTE [DISTWIDTH] IN BLOOD BY AUTOMATED COUNT: 17 % (ref 11.6–15.1)
HCT VFR BLD AUTO: 31.2 % (ref 34.8–46.1)
HGB BLD-MCNC: 10.2 G/DL (ref 11.5–15.4)
LYMPHOCYTES # BLD AUTO: 0.77 THOUSAND/UL (ref 0.6–4.47)
LYMPHOCYTES # BLD AUTO: 13 % (ref 14–44)
MCH RBC QN AUTO: 29.4 PG (ref 26.8–34.3)
MCHC RBC AUTO-ENTMCNC: 32.7 G/DL (ref 31.4–37.4)
MCV RBC AUTO: 90 FL (ref 82–98)
MONOCYTES # BLD AUTO: 0.41 THOUSAND/UL (ref 0–1.22)
MONOCYTES NFR BLD AUTO: 7 % (ref 4–12)
NEUTS BAND NFR BLD MANUAL: 1 % (ref 0–8)
NEUTS SEG # BLD: 4.6 THOUSAND/UL (ref 1.81–6.82)
NEUTS SEG NFR BLD AUTO: 77 % (ref 43–75)
PLATELET # BLD AUTO: 256 THOUSANDS/UL (ref 149–390)
PLATELET BLD QL SMEAR: ADEQUATE
PMV BLD AUTO: 7.4 FL (ref 8.9–12.7)
RBC # BLD AUTO: 3.47 MILLION/UL (ref 3.81–5.12)
TOTAL CELLS COUNTED SPEC: 100
WBC # BLD AUTO: 5.9 THOUSAND/UL (ref 4.31–10.16)
WBC NRBC COR # BLD: 5.9 THOUSAND/UL (ref 4.31–10.16)

## 2018-01-30 PROCEDURE — 85027 COMPLETE CBC AUTOMATED: CPT | Performed by: INTERNAL MEDICINE

## 2018-01-30 PROCEDURE — 85007 BL SMEAR W/DIFF WBC COUNT: CPT | Performed by: INTERNAL MEDICINE

## 2018-01-30 NOTE — PROGRESS NOTES
Central labs drawn via port  Catheter maintenance performed per protocol without complications  Next appointment scheduled in 6 weeks  AVS provided

## 2018-01-30 NOTE — PLAN OF CARE
Problem: Potential for Falls  Goal: Patient will remain free of falls  INTERVENTIONS:  - Assess patient frequently for physical needs  -  Identify cognitive and physical deficits and behaviors that affect risk of falls    -  [place] fall precautions as indicated by assessment   - Educate patient/family on patient safety including physical limitations  - Instruct patient to call for assistance with activity based on assessment  - Modify environment to reduce risk of injury  - Consider OT/PT consult to assist with strengthening/mobility   Outcome: Progressing

## 2018-02-09 DIAGNOSIS — R60.9 EDEMA, UNSPECIFIED TYPE: Primary | ICD-10-CM

## 2018-02-09 RX ORDER — FUROSEMIDE 40 MG/1
40 TABLET ORAL DAILY
Qty: 60 TABLET | Refills: 1 | Status: SHIPPED | OUTPATIENT
Start: 2018-02-09 | End: 2018-03-21 | Stop reason: SDUPTHER

## 2018-03-21 DIAGNOSIS — R60.9 EDEMA, UNSPECIFIED TYPE: ICD-10-CM

## 2018-03-21 RX ORDER — FUROSEMIDE 40 MG/1
40 TABLET ORAL 2 TIMES DAILY
Qty: 180 TABLET | Refills: 0 | Status: SHIPPED | OUTPATIENT
Start: 2018-03-21 | End: 2018-06-19 | Stop reason: SDUPTHER

## 2018-04-16 ENCOUNTER — TELEPHONE (OUTPATIENT)
Dept: FAMILY MEDICINE CLINIC | Facility: CLINIC | Age: 83
End: 2018-04-16

## 2018-04-16 DIAGNOSIS — R60.9 EDEMA, UNSPECIFIED TYPE: Primary | ICD-10-CM

## 2018-04-16 RX ORDER — POTASSIUM CHLORIDE 750 MG/1
CAPSULE, EXTENDED RELEASE ORAL
Qty: 60 CAPSULE | Refills: 5 | Status: SHIPPED | OUTPATIENT
Start: 2018-04-16

## 2018-04-16 NOTE — TELEPHONE ENCOUNTER
I spoke with patient  She is taking 40 mg of furosemide daily    Will add 20 mEq of potassium chloride daily

## 2018-04-16 NOTE — TELEPHONE ENCOUNTER
Patient called asking if she should be taking potassium since she is a diuretic patient , please advice

## 2018-05-30 ENCOUNTER — HOSPITAL ENCOUNTER (OUTPATIENT)
Dept: INFUSION CENTER | Facility: CLINIC | Age: 83
Discharge: HOME | End: 2018-05-30
Payer: MEDICARE

## 2018-05-30 LAB
ANISOCYTOSIS BLD QL SMEAR: PRESENT
BASOPHILS # BLD AUTO: 0 THOUSAND/UL (ref 0–0.1)
BASOPHILS NFR MAR MANUAL: 0 % (ref 0–1)
EOSINOPHIL # BLD AUTO: 0.2 THOUSAND/UL (ref 0–0.61)
EOSINOPHIL NFR BLD MANUAL: 4 % (ref 0–6)
ERYTHROCYTE [DISTWIDTH] IN BLOOD BY AUTOMATED COUNT: 16.5 % (ref 11.6–15.1)
HCT VFR BLD AUTO: 35.3 % (ref 34.8–46.1)
HGB BLD-MCNC: 11.2 G/DL (ref 11.5–15.4)
LG PLATELETS BLD QL SMEAR: PRESENT
LYMPHOCYTES # BLD AUTO: 1.12 THOUSAND/UL (ref 0.6–4.47)
LYMPHOCYTES # BLD AUTO: 22 % (ref 14–44)
MCH RBC QN AUTO: 29.2 PG (ref 26.8–34.3)
MCHC RBC AUTO-ENTMCNC: 31.7 G/DL (ref 31.4–37.4)
MCV RBC AUTO: 92 FL (ref 82–98)
METAMYELOCYTES NFR BLD MANUAL: 1 % (ref 0–1)
MONOCYTES # BLD AUTO: 0.15 THOUSAND/UL (ref 0–1.22)
MONOCYTES NFR BLD AUTO: 3 % (ref 4–12)
NEUTS BAND NFR BLD MANUAL: 0 % (ref 0–8)
NEUTS SEG # BLD: 3.57 THOUSAND/UL (ref 1.81–6.82)
NEUTS SEG NFR BLD AUTO: 70 % (ref 43–75)
OVALOCYTES BLD QL SMEAR: PRESENT
PLATELET # BLD AUTO: 246 THOUSANDS/UL (ref 149–390)
PLATELET BLD QL SMEAR: ADEQUATE
PMV BLD AUTO: 7.8 FL (ref 8.9–12.7)
RBC # BLD AUTO: 3.83 MILLION/UL (ref 3.81–5.12)
TOTAL CELLS COUNTED SPEC: 100
WBC # BLD AUTO: 5.1 THOUSAND/UL (ref 4.31–10.16)
WBC NRBC COR # BLD: 5.1 THOUSAND/UL (ref 4.31–10.16)

## 2018-05-30 PROCEDURE — 85007 BL SMEAR W/DIFF WBC COUNT: CPT | Performed by: INTERNAL MEDICINE

## 2018-05-30 PROCEDURE — 85027 COMPLETE CBC AUTOMATED: CPT | Performed by: INTERNAL MEDICINE

## 2018-05-30 NOTE — PLAN OF CARE
Problem: Potential for Falls  Goal: Patient will remain free of falls  INTERVENTIONS:  - Assess patient frequently for physical needs  -  Identify cognitive and physical deficits and behaviors that affect risk of falls    -  [place] fall precautions as indicated by assessment   - Educate patient/family on patient safety including physical limitations  - Instruct patient to call for assistance with activity based on assessment  - Modify environment to reduce risk of injury  - Consider OT/PT consult to assist with strengthening/mobility   Outcome: Progressing

## 2018-05-30 NOTE — PROGRESS NOTES
Called and spoke to Kait Arellano RN at Dr [facility] office for order for central labs  Last bloodwork and port flush was 1/30/18    Kait Arellano RN to confirm labs with Dr Hnay Tirado and will fax order

## 2018-05-30 NOTE — PROGRESS NOTES
CBC with diff drawn from port-a-cath per faxed order  Flushed with NSS per protocol, no heparin  Patient made aware to call Dr Edie Wisdom office for appointment  AVS printed, pt aware of next appt for port flush

## 2018-06-19 DIAGNOSIS — R60.9 EDEMA, UNSPECIFIED TYPE: ICD-10-CM

## 2018-06-19 RX ORDER — FUROSEMIDE 40 MG/1
40 TABLET ORAL 2 TIMES DAILY
Qty: 180 TABLET | Refills: 1 | Status: SHIPPED | OUTPATIENT
Start: 2018-06-19 | End: 2019-01-04 | Stop reason: SDUPTHER

## 2018-07-25 ENCOUNTER — HOSPITAL ENCOUNTER (OUTPATIENT)
Dept: INFUSION CENTER | Facility: CLINIC | Age: 83
Discharge: HOME | End: 2018-07-25
Payer: MEDICARE

## 2018-07-25 PROCEDURE — 96523 IRRIG DRUG DELIVERY DEVICE: CPT

## 2018-07-25 NOTE — PLAN OF CARE
Problem: Potential for Falls  Goal: Patient will remain free of falls  INTERVENTIONS:  - Assess patient frequently for physical needs  -  Identify cognitive and physical deficits and behaviors that affect risk of falls    -  [place] fall precautions as indicated by assessment   - Educate patient/family on patient safety including physical limitations  - Instruct patient to call for assistance with activity based on assessment  - Modify environment to reduce risk of injury  - Consider OT/PT consult to assist with strengthening/mobility   Outcome: Progressing

## 2018-07-25 NOTE — PROGRESS NOTES
Pt without complaint, pt's PAC flushed as ordered  No script for bloodwork presented by pt, no bloodwork orders evident in EPIC  Med rec not completed, pt does not have her med list with her today, unable to confirm med schedule  Pt states her port does not require heparin, no heparin used today  Next port flush appt scheduled for 6 weeks  Pt states she will call her MD in the meantime to determine if she needs any blood work drawn at that visit

## 2018-08-24 ENCOUNTER — OFFICE VISIT (OUTPATIENT)
Dept: FAMILY MEDICINE CLINIC | Facility: CLINIC | Age: 83
End: 2018-08-24
Payer: MEDICARE

## 2018-08-24 VITALS
TEMPERATURE: 98.4 F | RESPIRATION RATE: 18 BRPM | OXYGEN SATURATION: 94 % | DIASTOLIC BLOOD PRESSURE: 60 MMHG | HEART RATE: 74 BPM | SYSTOLIC BLOOD PRESSURE: 100 MMHG

## 2018-08-24 DIAGNOSIS — L30.9 DERMATITIS: Primary | ICD-10-CM

## 2018-08-24 PROCEDURE — 99213 OFFICE O/P EST LOW 20 MIN: CPT | Performed by: FAMILY MEDICINE

## 2018-08-24 RX ORDER — BETAMETHASONE DIPROPIONATE 0.5 MG/G
CREAM TOPICAL 2 TIMES DAILY
Qty: 30 G | Refills: 3 | Status: SHIPPED | OUTPATIENT
Start: 2018-08-24 | End: 2018-10-05

## 2018-08-24 RX ORDER — NYSTATIN 100000 U/G
CREAM TOPICAL 2 TIMES DAILY
Qty: 30 G | Refills: 0 | Status: SHIPPED | OUTPATIENT
Start: 2018-08-24 | End: 2018-10-11 | Stop reason: SDUPTHER

## 2018-08-24 NOTE — PROGRESS NOTES
Assessment/Plan:  Based on the patient's description she very likely has a candidal rash underneath her breasts  I recommended she stop using hydrocortisone cream and we will treat her with nystatin cream for that area  The other lesion she complains of on her extremities and torso more closely resemble a psoriatic rash  We will treat that with betamethasone cream applied sparingly once to 2 times daily  Diagnoses and all orders for this visit:    Dermatitis  -     nystatin (MYCOSTATIN) cream; Apply topically 2 (two) times a day Underneath breasts  -     betamethasone dipropionate (DIPROSONE) 0 05 % cream; Apply topically 2 (two) times a day          Subjective:      Patient ID: Jose Hamilton is a 80 y o  female  Patient presents with 2 skin related complaints  First she complains of lesions that are occurring on her extremities and occasionally on her torso which she describes as hard crusted calcium deposits which she picks off  Secondly she complains of a recurrent rash underneath her breasts  She has been using 2 5 percent hydrocortisone cream without any real improvement  The following portions of the patient's history were reviewed and updated as appropriate: allergies, current medications, past family history, past medical history, past social history, past surgical history and problem list     Review of Systems   Skin: Positive for rash           Objective:      /60 (BP Location: Left arm, Patient Position: Sitting, Cuff Size: Adult)   Pulse 74   Temp 98 4 °F (36 9 °C) (Tympanic)   Resp 18   SpO2 94%          Physical Exam   Skin:   Scattered erythematous patches on extremities with slight scaling and very small areas of plaque like material

## 2018-09-04 ENCOUNTER — TELEPHONE (OUTPATIENT)
Dept: FAMILY MEDICINE CLINIC | Facility: CLINIC | Age: 83
End: 2018-09-04

## 2018-09-04 NOTE — TELEPHONE ENCOUNTER
Pt called stating the cream Dr Dulce Liao gave her for the spots she had on her skin is not working  Pt stated she has many more spots than before  Informed pt Dr Dulce Liao is not in today and asked if she wanted to see another provider or wait until Dr Dulce Liao returned  Pt stated that she is handicapped and was hoping Dr Dulce Liao could just give her a call  Pt also stated she was looking online and found something similar to what she has on her skin and it said to have your thyroid checked  Pt asked if she can have her thyroid checked to see if that is what is causing it  Pt is aware that Dr Dulce Liao is out of the office, returning tomorrow and that he does have a busy day tomorrow so even though he returns tomorrow he may not be able to call her for another day or two  Pt stated that was ok as long as he got the message   Please call pt at [phone]

## 2018-09-05 ENCOUNTER — HOSPITAL ENCOUNTER (OUTPATIENT)
Dept: INFUSION CENTER | Facility: CLINIC | Age: 83
Discharge: HOME | End: 2018-09-05

## 2018-09-06 DIAGNOSIS — L30.9 DERMATITIS: Primary | ICD-10-CM

## 2018-09-06 NOTE — TELEPHONE ENCOUNTER
Please let patient know that she did have her thyroid checked less than a year ago and it was normal   I think it is unlikely that it would have changed since then  If she is not getting any relief from the cream that I ordered I will order a new topical cream that she can try in its place

## 2018-09-07 NOTE — TELEPHONE ENCOUNTER
Called and s/w pt she stated that the cream is helping very little, and would like to try another cream

## 2018-09-11 ENCOUNTER — OFFICE VISIT (OUTPATIENT)
Dept: SURGERY | Facility: MEDICAL CENTER | Age: 83
End: 2018-09-11
Payer: MEDICARE

## 2018-09-11 VITALS
DIASTOLIC BLOOD PRESSURE: 70 MMHG | SYSTOLIC BLOOD PRESSURE: 120 MMHG | RESPIRATION RATE: 20 BRPM | TEMPERATURE: 98.2 F | HEART RATE: 88 BPM

## 2018-09-11 DIAGNOSIS — M48.00 SPINAL STENOSIS, UNSPECIFIED SPINAL REGION: ICD-10-CM

## 2018-09-11 DIAGNOSIS — G35 MS (MULTIPLE SCLEROSIS) (HCC): Primary | ICD-10-CM

## 2018-09-11 DIAGNOSIS — K42.9 UMBILICAL HERNIA WITHOUT OBSTRUCTION AND WITHOUT GANGRENE: ICD-10-CM

## 2018-09-11 DIAGNOSIS — R10.84 GENERALIZED ABDOMINAL PAIN: ICD-10-CM

## 2018-09-11 PROCEDURE — 99213 OFFICE O/P EST LOW 20 MIN: CPT | Performed by: SURGERY

## 2018-09-11 NOTE — PROGRESS NOTES
Assessment/Plan: Kika Caban is an 80year old female who presents today, per referral by Dr Sahra Siddiqi, regarding a possible abdominal hernia  Physical exam revealed a small umbilical hernia  Her protruding abdomen is likely due to a combination of diastasis and a lack of core strength  Based on the physical exam and CT scan, there is significant no abdominal hernia  I showed her the CT images to explain that the fascia is intact  Recommend not operating on the umbilical hernia based on the size  Explained her symptoms of pressure and gurgling of abdomen is more likely related to GI tract  The distension and bloating may be related to decreased GI mobility  She knows to call the office if any questions or concerns arise  No problem-specific Assessment & Plan notes found for this encounter  Diagnoses and all orders for this visit:    [facility]    Spinal stenosis, unspecified spinal region    Generalized abdominal pain    Umbilical hernia without obstruction and without gangrene          Subjective:      Patient ID: Shreyas Kuhn is a 80 y o  female  Kika Caban is an 80year old female who presents today, per referral by Dr Sahra Siddiqi, regarding a possible abdominal hernia  She had a CT scan on 8/7/17 and an US done on 10/30/17  She saw two surgeons a few years ago who said they were not able to help her  She reports some pain that she says feels like "pressing" on her bladder and rectum, as well as pressure and gurgling in her abdomen  She also notes some constipation  The following portions of the patient's history were reviewed and updated as appropriate: allergies, current medications, past family history, past medical history, past social history, past surgical history and problem list     Review of Systems   Constitutional: Negative for chills, fever and unexpected weight change  HENT: Negative  Negative for congestion, sore throat and trouble swallowing  Eyes: Negative  Negative for discharge and itching  Respiratory: Negative  Negative for cough, shortness of breath and wheezing  Cardiovascular: Negative  Negative for chest pain and leg swelling  Gastrointestinal: Positive for abdominal pain and constipation  Endocrine: Negative  Negative for cold intolerance and heat intolerance  Genitourinary: Negative  Negative for difficulty urinating, dysuria and frequency  Musculoskeletal: Positive for back pain and gait problem  Negative for arthralgias and joint swelling  Skin: Negative  Negative for color change and wound  Allergic/Immunologic: Negative  Neurological: Positive for weakness and numbness  Negative for dizziness and headaches  Hematological: Negative  Psychiatric/Behavioral: Negative  Negative for agitation and confusion  The patient is not nervous/anxious  All other systems reviewed and are negative  Objective:      /70   Pulse 88   Temp 98 2 °F (36 8 °C) (Tympanic)   Resp 20          Physical Exam   Constitutional: She is oriented to person, place, and time  She appears well-developed  She is cooperative  HENT:   Head: Normocephalic and atraumatic  Eyes: Conjunctivae and EOM are normal  Pupils are equal, round, and reactive to light  Neck: Trachea normal  Neck supple  Cardiovascular: Regular rhythm and normal heart sounds  Pulmonary/Chest: Effort normal and breath sounds normal    Abdominal: Soft  Small umbilical hernia  Protuberant abdomen with minimal core strength   Musculoskeletal:        Right shoulder: She exhibits decreased range of motion and decreased strength  Neurological: She is alert and oriented to person, place, and time  Skin: Skin is warm and dry  Psychiatric: She has a normal mood and affect  Her behavior is normal    Nursing note and vitals reviewed          By signing my name below, I, Fifi Pinedo, attest that this documentation has been prepared under the direction and in the presence of Dragan Brooks MD  Electronically Signed: Bruce Ferrera 9/11/2018  Gypsy Quezada, personally performed the services described in this documentation  All medical record entries made by the scribe were at my direction and in my presence  I have reviewed the chart and discharge instructions (if applicable) and agree that the record reflects my personal performance and is accurate and complete  Dragan Brooks MD  9/11/2018

## 2018-09-11 NOTE — LETTER
September 12, 2018     Malcolm Cassidy, DO [address] [facility]    Patient: Musa Bingham   YOB: 1935   Date of Visit: 9/11/2018       Dear Dr Tresa Salgado: Thank you for referring Christy Caputosparkle to me for evaluation  Below are my notes for this consultation  If you have questions, please do not hesitate to call me  I look forward to following your patient along with you           Sincerely,        Karina Dolan MD        CC: Yaniv Jaime DO

## 2018-09-12 ENCOUNTER — HOSPITAL ENCOUNTER (OUTPATIENT)
Dept: INFUSION CENTER | Facility: CLINIC | Age: 83
Discharge: HOME | End: 2018-09-12
Payer: MEDICARE

## 2018-09-12 LAB
ALBUMIN SERPL BCP-MCNC: 3.3 G/DL (ref 3.5–5.7)
ALP SERPL-CCNC: 77 U/L (ref 46–116)
ALT SERPL W P-5'-P-CCNC: 17 U/L (ref 12–78)
ANION GAP SERPL CALCULATED.3IONS-SCNC: 13 MMOL/L (ref 4–13)
AST SERPL W P-5'-P-CCNC: 25 U/L (ref 5–45)
BILIRUB SERPL-MCNC: 0.6 MG/DL (ref 0.2–1)
BUN SERPL-MCNC: 22 MG/DL (ref 5–25)
CALCIUM SERPL-MCNC: 9.8 MG/DL (ref 8.3–10.1)
CHLORIDE SERPL-SCNC: 101 MMOL/L (ref 98–108)
CO2 SERPL-SCNC: 32 MMOL/L (ref 21–32)
CREAT SERPL-MCNC: 0.7 MG/DL (ref 0.6–1.3)
ERYTHROCYTE [DISTWIDTH] IN BLOOD BY AUTOMATED COUNT: 17.4 % (ref 11.6–15.1)
FERRITIN SERPL-MCNC: 52 NG/ML (ref 8–388)
GFR SERPL CREATININE-BSD FRML MDRD: 80 ML/MIN/1.73SQ M
GLUCOSE SERPL-MCNC: 106 MG/DL (ref 65–140)
GRANULOCYTES NFR BLD AUTO: 65.6 % (ref 47–80)
GRANULOCYTES NFR BLD: 2.9 THOUSAND/ΜL (ref 1.85–7.82)
HCT VFR BLD AUTO: 37.5 % (ref 34.8–46.1)
HGB BLD-MCNC: 11.2 G/DL (ref 11.5–15.4)
LYMPHOCYTES # BLD AUTO: 1.3 THOUSANDS/ΜL (ref 0.6–4.47)
LYMPHOCYTES NFR BLD AUTO: 29 % (ref 14–44)
MCH RBC QN AUTO: 29 PG (ref 26.8–34.3)
MCHC RBC AUTO-ENTMCNC: 29.8 G/DL (ref 31.4–37.4)
MCV RBC AUTO: 97 FL (ref 82–98)
MONOCYTES # BLD AUTO: 0.3 THOUSAND/ΜL (ref 0.17–1.22)
MONOCYTES NFR BLD AUTO: 6 % (ref 4–12)
PLATELET # BLD AUTO: 245 THOUSANDS/UL (ref 149–390)
PMV BLD AUTO: 7.9 FL (ref 8.9–12.7)
POTASSIUM SERPL-SCNC: 3.9 MMOL/L (ref 3.5–5.3)
PROT SERPL-MCNC: 7.4 G/DL (ref 6.4–8.2)
RBC # BLD AUTO: 3.86 MILLION/UL (ref 3.81–5.12)
SODIUM SERPL-SCNC: 146 MMOL/L (ref 136–145)
WBC # BLD AUTO: 4.4 THOUSAND/UL (ref 4.31–10.16)
WBC NRBC COR # BLD: 4.4 THOUSAND/UL (ref 4.31–10.16)

## 2018-09-12 PROCEDURE — 85025 COMPLETE CBC W/AUTO DIFF WBC: CPT | Performed by: INTERNAL MEDICINE

## 2018-09-12 PROCEDURE — 82728 ASSAY OF FERRITIN: CPT | Performed by: INTERNAL MEDICINE

## 2018-09-12 PROCEDURE — 80053 COMPREHEN METABOLIC PANEL: CPT | Performed by: INTERNAL MEDICINE

## 2018-09-12 NOTE — PLAN OF CARE
Problem: Potential for Falls  Goal: Patient will remain free of falls  INTERVENTIONS:  - Assess patient frequently for physical needs  -  Identify cognitive and physical deficits and behaviors that affect risk of falls    -  [place] fall precautions as indicated by assessment   - Educate patient/family on patient safety including physical limitations  - Instruct patient to call for assistance with activity based on assessment  - Modify environment to reduce risk of injury  - Consider OT/PT consult to assist with strengthening/mobility   Outcome: Progressing

## 2018-09-12 NOTE — PROGRESS NOTES
Pt without complaint, pt's PAC flushed and central labs drawn per hospital protocol  Pt tolerated well and left unit in stable condition  Pt declines AVS, pt scheduled her next port flush appt

## 2018-10-05 ENCOUNTER — OFFICE VISIT (OUTPATIENT)
Dept: FAMILY MEDICINE CLINIC | Facility: CLINIC | Age: 83
End: 2018-10-05
Payer: MEDICARE

## 2018-10-05 VITALS
SYSTOLIC BLOOD PRESSURE: 104 MMHG | HEART RATE: 75 BPM | RESPIRATION RATE: 14 BRPM | OXYGEN SATURATION: 94 % | DIASTOLIC BLOOD PRESSURE: 68 MMHG | TEMPERATURE: 97.4 F

## 2018-10-05 DIAGNOSIS — L01.00 IMPETIGO: Primary | ICD-10-CM

## 2018-10-05 PROCEDURE — 99214 OFFICE O/P EST MOD 30 MIN: CPT | Performed by: FAMILY MEDICINE

## 2018-10-05 RX ORDER — DICLOFENAC SODIUM AND MISOPROSTOL 75; 200 MG/1; UG/1
TABLET, DELAYED RELEASE ORAL
COMMUNITY
Start: 2017-10-24

## 2018-10-05 RX ORDER — BRINZOLAMIDE 10 MG/ML
SUSPENSION/ DROPS OPHTHALMIC
COMMUNITY
Start: 2018-02-22

## 2018-10-05 RX ORDER — DOXYCYCLINE HYCLATE 100 MG/1
100 CAPSULE ORAL EVERY 12 HOURS SCHEDULED
Qty: 14 CAPSULE | Refills: 0 | Status: SHIPPED | OUTPATIENT
Start: 2018-10-05 | End: 2018-10-12

## 2018-10-05 RX ORDER — METHOCARBAMOL 500 MG/1
TABLET, FILM COATED ORAL 4 TIMES DAILY
COMMUNITY

## 2018-10-05 RX ORDER — BRIMONIDINE TARTRATE/TIMOLOL 0.2%-0.5%
1 DROPS OPHTHALMIC (EYE) EVERY 12 HOURS SCHEDULED
COMMUNITY
Start: 2018-09-01 | End: 2018-11-28

## 2018-10-05 NOTE — PROGRESS NOTES
Assessment/Plan:   1  Impetigo  Patient's symptoms appear likely secondary to an dot she was educated on the pathophysiology is problem  At this time, she instructed on proper wound care  Will start treatment with doxycycline  She does have an appointment with her dermatologist on Monday  She was advised to follow up with Dermatology to assess any further changes in this infection  - doxycycline hyclate (VIBRAMYCIN) 100 mg capsule; Take 1 capsule (100 mg total) by mouth every 12 (twelve) hours for 7 days  Dispense: 14 capsule; Refill: 0     Diagnoses and all orders for this visit:    Impetigo  -     doxycycline hyclate (VIBRAMYCIN) 100 mg capsule; Take 1 capsule (100 mg total) by mouth every 12 (twelve) hours for 7 days    Other orders  -     diclofenac-misoprostol (ARTHROTEC) 75-0 2 MG per tablet; TAKE ONE TABLET BY MOUTH EVERY DAY  -     brinzolamide (AZOPT) 1 % ophthalmic suspension; USE 1 DROP IN THE LEFT EYE THREE TIMES A DAY  -     COMBIGAN 0 2-0 5 %;   -     Multiple Vitamins-Minerals (CENTRUM ADULTS PO); Take by mouth  -     methocarbamol (ROBAXIN) 500 mg tablet; Take by mouth 4 times daily          Subjective:    Chief Complaint   Patient presents with    Rash     on right leg in thigh         Patient ID: Sincere Monique is a 80 y o  female  Patient is a 80-year-old female presents today with CC of right medial thigh erythematous patch  She states that she has had this for the past few days  Symptoms started as a small red bump in have been slowly increasing in size  She has been noticing mild scaling in this area  She denies fevers or chills today however has been feeling fatigued  She has been applying antibiotic ointment however missed the past few days  Review of Systems   Constitutional: Negative for activity change, chills, fatigue and fever  HENT: Negative for congestion, ear pain, sinus pressure and sore throat  Eyes: Negative for redness, itching and visual disturbance  Respiratory: Negative for cough and shortness of breath  Cardiovascular: Negative for chest pain and palpitations  Gastrointestinal: Negative for abdominal pain, diarrhea and nausea  Endocrine: Negative for cold intolerance and heat intolerance  Genitourinary: Negative for dysuria, flank pain and frequency  Musculoskeletal: Negative for arthralgias, back pain, gait problem and myalgias  Skin: Negative for color change  Allergic/Immunologic: Negative for environmental allergies  Neurological: Negative for dizziness, numbness and headaches  Psychiatric/Behavioral: Negative for behavioral problems and sleep disturbance  The following portions of the patient's history were reviewed and updated as appropriate : past family history, past medical history, past social history and past surgical history  Current Outpatient Prescriptions:     Biotin 1000 MCG tablet, Take 1,000 mcg by mouth daily  , Disp: , Rfl:     brinzolamide (AZOPT) 1 % ophthalmic suspension, USE 1 DROP IN THE LEFT EYE THREE TIMES A DAY, Disp: , Rfl:     diclofenac-misoprostol (ARTHROTEC) 75-0 2 MG per tablet, TAKE ONE TABLET BY MOUTH EVERY DAY, Disp: , Rfl:     furosemide (LASIX) 40 mg tablet, Take 1 tablet (40 mg total) by mouth 2 (two) times a day, Disp: 180 tablet, Rfl: 1    Magnesium 250 MG TABS, Take 1 tablet by mouth 2 (two) times a day , Disp: , Rfl:     mesalamine (LIALDA) 1 2 G EC tablet, Take 2,400 mg by mouth daily with breakfast , Disp: , Rfl:     Misc Natural Products (OSTEO BI-FLEX ADV DOUBLE ST PO), Take 1 tablet by mouth daily  , Disp: , Rfl:     Multiple Vitamins-Minerals (CENTRUM ADULTS PO), Take by mouth, Disp: , Rfl:     Nutritional Supplements (VITAMIN D MAINTENANCE PO), Take 400 Int'l Units by mouth daily  , Disp: , Rfl:     potassium chloride (MICRO-K) 10 MEQ CR capsule, 2 caps qd, Disp: 60 capsule, Rfl: 05    PrednisoLONE Acetate (PRED MILD OP), Apply 1 drop to eye every other day   Left eye, Disp: , Rfl:     pregabalin (LYRICA) 75 mg capsule, Take 75 mg by mouth 2 (two) times a day , Disp: , Rfl:     Probiotic Product (SUPER PROBIOTIC DIGESTIVE PO), Take 1 tablet by mouth daily  , Disp: , Rfl:     Bioflavonoid Products (AMANDO C PO), Take 1 tablet by mouth daily  , Disp: , Rfl:     Calcium Carbonate (CALTRATE 600 PO), Take 2 tablets by mouth daily  , Disp: , Rfl:     Carboxymethylcell-Hypromellose (GENTEAL OP), Apply 1 drop to eye daily at bedtime  Both eyes, Disp: , Rfl:     COMBIGAN 0 2-0 5 %, , Disp: , Rfl:     Cyanocobalamin (VITAMIN B-12 PO), Take 1 tablet by mouth daily  , Disp: , Rfl:     diclofenac sodium (VOLTAREN) 1 %, Apply 2 g topically as needed  , Disp: , Rfl:     doxycycline hyclate (VIBRAMYCIN) 100 mg capsule, Take 1 capsule (100 mg total) by mouth every 12 (twelve) hours for 7 days, Disp: 14 capsule, Rfl: 0    escitalopram (LEXAPRO) 5 mg tablet, Take 5 mg by mouth  2 times a week for depression, Disp: , Rfl:     lidocaine (LIDODERM) 5 %, Place 1 patch on the skin daily as needed for mild pain  Remove & Discard patch within 12 hours or as directed by MD, Disp: , Rfl:     methocarbamol (ROBAXIN) 500 mg tablet, Take by mouth 4 times daily, Disp: , Rfl:     nystatin (MYCOSTATIN) cream, Apply topically 2 (two) times a day Underneath breasts, Disp: 30 g, Rfl: 0    Omega-3 Fatty Acids (FISH OIL) 1200 MG CAPS, Take 1 tablet by mouth daily  , Disp: , Rfl:     Omeprazole 20 MG TBEC, Take 1 tablet by mouth daily  , Disp: , Rfl:     ondansetron (ZOFRAN) 8 mg tablet, Take 8 mg by mouth every 8 (eight) hours as needed for nausea or vomiting , Disp: , Rfl:     Polyethyl Glycol-Propyl Glycol (SYSTANE ULTRA OP), Apply to eye 3 (three) times a day  Both eyes, Disp: , Rfl:     sodium chloride () 2 % hypertonic ophthalmic solution, 1 drop 5 (five) times a day , Disp: , Rfl:     sodium chloride () 5 % hypertonic ophthalmic solution, Administer 2 drops into the left eye as needed  , Disp: , Rfl:     traMADol (ULTRAM) 50 mg tablet, Take 1 tablet by mouth every 6 (six) hours as needed for moderate pain (Patient not taking: Reported on 10/5/2018 ), Disp: 10 tablet, Rfl: 0    vitamin E, tocopherol, 400 units capsule, Take 400 Units by mouth daily  , Disp: , Rfl:     Objective:    Vitals:    10/05/18 1553   BP: 104/68   BP Location: Left arm   Patient Position: Sitting   Cuff Size: Adult   Pulse: 75   Resp: 14   Temp: (!) 97 4 °F (36 3 °C)   TempSrc: Tympanic   SpO2: 94%        Physical Exam   Constitutional: She is oriented to person, place, and time  She appears well-developed and well-nourished  HENT:   Head: Normocephalic and atraumatic  Nose: Nose normal    Mouth/Throat: No oropharyngeal exudate  Eyes: Pupils are equal, round, and reactive to light  Right eye exhibits no discharge  Left eye exhibits no discharge  Neck: Normal range of motion  Neck supple  No tracheal deviation present  Cardiovascular: Normal rate, regular rhythm and intact distal pulses  Exam reveals no gallop and no friction rub  No murmur heard  Pulses:       Dorsalis pedis pulses are 2+ on the right side, and 2+ on the left side  Posterior tibial pulses are 2+ on the right side, and 2+ on the left side  Pulmonary/Chest: Effort normal and breath sounds normal  No respiratory distress  She has no wheezes  She has no rales  Abdominal: Soft  Bowel sounds are normal  She exhibits no distension  There is no tenderness  There is no rebound and no guarding  Musculoskeletal: Normal range of motion  She exhibits no edema  Lymphadenopathy:        Head (right side): No submental and no submandibular adenopathy present  Head (left side): No submental and no submandibular adenopathy present  She has no cervical adenopathy  Right cervical: No superficial cervical, no deep cervical and no posterior cervical adenopathy present         Left cervical: No superficial cervical, no deep cervical and no posterior cervical adenopathy present  Neurological: She is alert and oriented to person, place, and time  No cranial nerve deficit or sensory deficit  Skin: Skin is warm, dry and intact  Psychiatric: Her speech is normal and behavior is normal  Judgment normal  Her mood appears not anxious  Cognition and memory are normal  She does not exhibit a depressed mood  Vitals reviewed

## 2018-10-11 DIAGNOSIS — L30.9 DERMATITIS: ICD-10-CM

## 2018-10-11 RX ORDER — NYSTATIN 100000 U/G
CREAM TOPICAL 2 TIMES DAILY
Qty: 30 G | Refills: 0 | Status: SHIPPED | OUTPATIENT
Start: 2018-10-11

## 2018-10-15 ENCOUNTER — TELEPHONE (OUTPATIENT)
Dept: FAMILY MEDICINE CLINIC | Facility: CLINIC | Age: 83
End: 2018-10-15

## 2018-10-15 DIAGNOSIS — R39.9 URINARY TRACT INFECTION SYMPTOMS: Primary | ICD-10-CM

## 2018-10-15 LAB
SL AMB  POCT GLUCOSE, UA: NORMAL
SL AMB LEUKOCYTE ESTERASE,UA: NORMAL
SL AMB POCT BILIRUBIN,UA: NORMAL
SL AMB POCT BLOOD,UA: NORMAL
SL AMB POCT CLARITY,UA: CLEAR
SL AMB POCT COLOR,UA: YELLOW
SL AMB POCT KETONES,UA: NORMAL
SL AMB POCT NITRITE,UA: NORMAL
SL AMB POCT PH,UA: 7
SL AMB POCT SPECIFIC GRAVITY,UA: 1.01
SL AMB POCT URINE PROTEIN: NORMAL
SL AMB POCT UROBILINOGEN: 0.2

## 2018-10-15 NOTE — TELEPHONE ENCOUNTER
pts  [facility] called pt feels she has a bladder infection pts  is going to drop off urine sample to be tested   Pt is handicapped and it is hard to get into the ofc  pls dip urine and if positive send abx to [facility] and let pt know  Lawanda Dwyer will be dropping off the urine in about half an hour

## 2018-10-24 ENCOUNTER — HOSPITAL ENCOUNTER (OUTPATIENT)
Dept: INFUSION CENTER | Facility: CLINIC | Age: 83
Discharge: HOME | End: 2018-10-24

## 2018-10-24 ENCOUNTER — OFFICE VISIT (OUTPATIENT)
Dept: FAMILY MEDICINE CLINIC | Facility: CLINIC | Age: 83
End: 2018-10-24
Payer: MEDICARE

## 2018-10-24 VITALS
HEART RATE: 75 BPM | DIASTOLIC BLOOD PRESSURE: 68 MMHG | RESPIRATION RATE: 13 BRPM | OXYGEN SATURATION: 98 % | TEMPERATURE: 97.9 F | SYSTOLIC BLOOD PRESSURE: 116 MMHG

## 2018-10-24 DIAGNOSIS — L30.0 NUMMULAR ECZEMA: Primary | ICD-10-CM

## 2018-10-24 DIAGNOSIS — M19.90 ARTHRITIS: ICD-10-CM

## 2018-10-24 DIAGNOSIS — Z23 NEED FOR IMMUNIZATION AGAINST INFLUENZA: ICD-10-CM

## 2018-10-24 DIAGNOSIS — Z23 NEED FOR VACCINATION: ICD-10-CM

## 2018-10-24 PROCEDURE — 99214 OFFICE O/P EST MOD 30 MIN: CPT | Performed by: FAMILY MEDICINE

## 2018-10-24 PROCEDURE — 90662 IIV NO PRSV INCREASED AG IM: CPT | Performed by: FAMILY MEDICINE

## 2018-10-24 PROCEDURE — G0008 ADMIN INFLUENZA VIRUS VAC: HCPCS | Performed by: FAMILY MEDICINE

## 2018-10-24 PROCEDURE — G0009 ADMIN PNEUMOCOCCAL VACCINE: HCPCS | Performed by: FAMILY MEDICINE

## 2018-10-24 PROCEDURE — 90732 PPSV23 VACC 2 YRS+ SUBQ/IM: CPT | Performed by: FAMILY MEDICINE

## 2018-10-24 RX ORDER — BETAMETHASONE DIPROPIONATE 0.5 MG/G
CREAM TOPICAL 2 TIMES DAILY
Qty: 30 G | Refills: 1 | Status: SHIPPED | OUTPATIENT
Start: 2018-10-24

## 2018-10-24 RX ORDER — CELECOXIB 200 MG/1
200 CAPSULE ORAL DAILY
Qty: 30 CAPSULE | Refills: 5 | Status: SHIPPED | OUTPATIENT
Start: 2018-10-24 | End: 2019-01-18 | Stop reason: SDUPTHER

## 2018-10-24 RX ORDER — METHYLPREDNISOLONE 4 MG/1
TABLET ORAL
Qty: 21 TABLET | Refills: 0 | Status: SHIPPED | OUTPATIENT
Start: 2018-10-24 | End: 2018-11-28

## 2018-10-24 NOTE — ASSESSMENT & PLAN NOTE
Patient presents with ongoing itchy diffuse rash on arms torso and legs  She has been given Rx is for antibiotics, namely doxycycline, without affect  She has been seen at our office and by her dermatologist    Will give Rx for Medrol Dosepak as well as diprolene  Cream b i d  P r n     Call if further problems

## 2018-10-24 NOTE — PROGRESS NOTES
Assessment/Plan:    Nummular eczema  Patient presents with ongoing itchy diffuse rash on arms torso and legs  She has been given Rx is for antibiotics, namely doxycycline, without affect  She has been seen at our office and by her dermatologist    Will give Rx for Medrol Dosepak as well as diprolene  Cream b i d  P r n     Call if further problems      Arthritis   Patient with longstanding history of arthritis  She had done well on Celebrex in the past and is requesting to restart this medication  Will give Rx for Celebrex 200 mg daily as needed  Call further problems       Diagnoses and all orders for this visit:    Nummular eczema  -     Methylprednisolone 4 MG TBPK; Use as directed on package  -     betamethasone, augmented, (DIPROLENE-AF) 0 05 % cream; Apply topically 2 (two) times a day    Need for immunization against influenza  -     influenza vaccine, 2381-4947, high-dose, PF 0 5 mL, for patients 65 yr+ (FLUZONE HIGH-DOSE)    Need for vaccination  -     PNEUMOCOCCAL POLYSACCHARIDE VACCINE 23-VALENT =>3YO SQ IM    Arthritis  -     celecoxib (CeleBREX) 200 mg capsule; Take 1 capsule (200 mg total) by mouth daily       Flu shot and Pneumovax today   call further problems    Subjective:      Patient ID: Weston Schofield is a 80 y o  female  Patient presents with ongoing itchy diffuse rash on arms torso and legs  She has been given Rx is for antibiotics, namely doxycycline, without affect  She has been seen at our office and by her dermatologist     patient also would like a prescription for her arthritis  She is requesting Celebrex, which she had done well on in the past        The following portions of the patient's history were reviewed and updated as appropriate: allergies, current medications, past family history, past medical history, past social history, past surgical history and problem list     Review of Systems   Respiratory: Negative  Cardiovascular: Negative  Gastrointestinal: Negative  Genitourinary: Negative  Musculoskeletal: Positive for arthralgias  Skin: Positive for rash  Objective:      /68 (BP Location: Left arm, Patient Position: Sitting, Cuff Size: Standard)   Pulse 75   Temp 97 9 °F (36 6 °C) (Tympanic)   Resp 13   SpO2 98%          Physical Exam   Skin:    Diffuse erythematous patches/papules extremities and torso

## 2018-10-24 NOTE — ASSESSMENT & PLAN NOTE
Patient with longstanding history of arthritis  She had done well on Celebrex in the past and is requesting to restart this medication  Will give Rx for Celebrex 200 mg daily as needed    Call further problems

## 2018-10-29 ENCOUNTER — TELEPHONE (OUTPATIENT)
Dept: FAMILY MEDICINE CLINIC | Facility: CLINIC | Age: 83
End: 2018-10-29

## 2018-10-29 DIAGNOSIS — L30.0 NUMMULAR ECZEMA: Primary | ICD-10-CM

## 2018-10-29 RX ORDER — PREDNISONE 10 MG/1
TABLET ORAL
Qty: 34 TABLET | Refills: 0 | Status: SHIPPED | OUTPATIENT
Start: 2018-10-29 | End: 2018-11-28

## 2018-10-29 NOTE — TELEPHONE ENCOUNTER
I spoke with patient, her rash is much improved on Medrol Doepak, but not fully resolved  She is requesting another course of steroids  Rx called in    Call further problems

## 2018-10-29 NOTE — TELEPHONE ENCOUNTER
Patient requesting a call from you, patient states that her rash is not going away completely and she already finish the med that was prescribe, patient said that she rather talk to you, please call patient at [phone], thank you

## 2018-11-19 ENCOUNTER — HOSPITAL ENCOUNTER (OUTPATIENT)
Dept: INFUSION CENTER | Facility: CLINIC | Age: 83
Discharge: HOME | End: 2018-11-19
Payer: MEDICARE

## 2018-11-19 PROCEDURE — 96523 IRRIG DRUG DELIVERY DEVICE: CPT

## 2018-11-19 NOTE — PROGRESS NOTES
Pt without complaint  Pt's port flushed with NSS only/no heparin used, at pt direction  Pt tolerated well  Pt denies needing any blood work drawn today  Pt unable to perform med rec, she does not know her home medications  Pt's next appt scheduled for 8 weeks

## 2018-11-28 ENCOUNTER — OFFICE VISIT (OUTPATIENT)
Dept: FAMILY MEDICINE CLINIC | Facility: CLINIC | Age: 83
End: 2018-11-28
Payer: MEDICARE

## 2018-11-28 VITALS
HEART RATE: 81 BPM | TEMPERATURE: 97.5 F | RESPIRATION RATE: 16 BRPM | OXYGEN SATURATION: 96 % | SYSTOLIC BLOOD PRESSURE: 126 MMHG | DIASTOLIC BLOOD PRESSURE: 70 MMHG

## 2018-11-28 DIAGNOSIS — H61.23 BILATERAL IMPACTED CERUMEN: Primary | ICD-10-CM

## 2018-11-28 PROCEDURE — 99213 OFFICE O/P EST LOW 20 MIN: CPT | Performed by: FAMILY MEDICINE

## 2018-11-28 PROCEDURE — 69209 REMOVE IMPACTED EAR WAX UNI: CPT | Performed by: FAMILY MEDICINE

## 2018-11-28 NOTE — PROGRESS NOTES
Ear cerumen removal  Date/Time: 11/28/2018 5:48 PM  Performed by: Sara Frias by: Sue Keating     Patient location:  Clinic  Other Assisting Provider: Yes (comment)    Consent:     Consent obtained:  Verbal    Consent given by:  Patient    Risks discussed:  Bleeding, infection, incomplete removal, TM perforation, pain and dizziness    Alternatives discussed:  No treatment  Universal protocol:     Procedure explained and questions answered to patient or proxy's satisfaction: yes      Patient identity confirmed:  Verbally with patient  Procedure details:     Local anesthetic:  None    Location:  L ear, R ear and external auditory canal    Procedure type: irrigation      Approach:  External  Post-procedure details:     Complication:  None    Hearing quality:  Improved    Patient tolerance of procedure: Tolerated well, no immediate complications  Comments:       Bilateral cerumen impactions lavaged with warm water and peroxide  Good result bilaterally

## 2018-11-28 NOTE — PROGRESS NOTES
Assessment/Plan:     cerumen impaction   will lavage     dermatitis midback   treat with  Betamethasone which patient has from prior prescription     There are no diagnoses linked to this encounter  Subjective:      Patient ID: Christiano Grandchild is a 80 y o  female  Patient presents with 2 complaints  First she complains of an itchy patch on her back  Her home health aide was trying her back with a towel after a shower and scraped off a skin lesion that was present  Since then the back has been irritated and itchy  No pain  She also complains of bilateral ear clogging  She has a history of cerumen impactions  The following portions of the patient's history were reviewed and updated as appropriate: allergies, current medications, past family history, past medical history, past social history, past surgical history and problem list     Review of Systems   HENT:          Clogged ears bilaterally   Skin: Positive for rash  Objective:      /70 (BP Location: Left arm, Patient Position: Sitting, Cuff Size: Large)   Pulse 81   Temp 97 5 °F (36 4 °C) (Tympanic)   Resp 16   SpO2 96%   Breastfeeding?  No          Physical Exam   Eyes:    Bilateral cerumen impaction   Skin:    Erythematous scaling patch midback

## 2018-12-05 ENCOUNTER — OFFICE VISIT (OUTPATIENT)
Dept: FAMILY MEDICINE CLINIC | Facility: CLINIC | Age: 83
End: 2018-12-05
Payer: MEDICARE

## 2018-12-05 VITALS
HEART RATE: 100 BPM | TEMPERATURE: 99.2 F | OXYGEN SATURATION: 99 % | DIASTOLIC BLOOD PRESSURE: 76 MMHG | SYSTOLIC BLOOD PRESSURE: 116 MMHG | RESPIRATION RATE: 16 BRPM

## 2018-12-05 DIAGNOSIS — R60.0 LOCALIZED EDEMA: Primary | ICD-10-CM

## 2018-12-05 PROCEDURE — 99213 OFFICE O/P EST LOW 20 MIN: CPT | Performed by: FAMILY MEDICINE

## 2018-12-05 RX ORDER — METOLAZONE 2.5 MG/1
2.5 TABLET ORAL DAILY
Qty: 30 TABLET | Refills: 2 | Status: SHIPPED | OUTPATIENT
Start: 2018-12-05

## 2018-12-05 NOTE — PROGRESS NOTES
Assessment/Plan:    Edema  Pt presents  W/ worsening LE edema B/L x a few weeks  Pt takes furosemide 40 mg bid  Denies any SOB> pt uses compression stockings   Will add Zaroxolyn 2 5 mg daily  Patient has normal creatinine  Also advised patient to elevate lower extremities as much as possible  Recheck in 1 week if not improved         Diagnoses and all orders for this visit:    Localized edema  -     metolazone (ZAROXOLYN) 2 5 mg tablet; Take 1 tablet (2 5 mg total) by mouth daily          Subjective:      Patient ID: Mj Briceño is a 80 y o  female  Pt presents  W/ worsening LE edema B/L x a few weeks  Pt takes furosemide 40 mg bid   Denies any SOB> pt uses compression stockings         The following portions of the patient's history were reviewed and updated as appropriate: allergies, current medications, past family history, past medical history, past social history, past surgical history and problem list     Review of Systems      Objective:      /76 (BP Location: Right arm, Patient Position: Sitting, Cuff Size: Standard)   Pulse 100   Temp 99 2 °F (37 3 °C) (Tympanic)   Resp 16   SpO2 99%          Physical Exam

## 2018-12-05 NOTE — ASSESSMENT & PLAN NOTE
Pt presents  W/ worsening LE edema B/L x a few weeks  Pt takes furosemide 40 mg bid  Denies any SOB> pt uses compression stockings   Will add Zaroxolyn 2 5 mg daily  Patient has normal creatinine  Also advised patient to elevate lower extremities as much as possible    Recheck in 1 week if not improved

## 2018-12-06 ENCOUNTER — TELEPHONE (OUTPATIENT)
Dept: FAMILY MEDICINE CLINIC | Facility: CLINIC | Age: 83
End: 2018-12-06

## 2018-12-06 NOTE — TELEPHONE ENCOUNTER
Pt called stating you had seen her yesterday and prescribed metolazone  She is concerned about taking medication since she already has multiple of the side effect factors and doesn't want to worsens sxs  Please advise

## 2018-12-10 ENCOUNTER — TELEPHONE (OUTPATIENT)
Dept: FAMILY MEDICINE CLINIC | Facility: CLINIC | Age: 83
End: 2018-12-10

## 2018-12-10 DIAGNOSIS — R60.0 LOCALIZED EDEMA: Primary | ICD-10-CM

## 2018-12-10 NOTE — TELEPHONE ENCOUNTER
Call patient    I placed a referral for her to see a lymphedema specialist   They will contact her to set up an appointment

## 2018-12-10 NOTE — TELEPHONE ENCOUNTER
Pt called into the office and stated that she started taking Metolazone 2 5mg on 1/5/18  Since starting this medication she has been confused, and forgetful, not enough output in her urine, and wants to sleep all day  Pt took her last dose on 12/09/18 @ 1:30pm      Pt also stated to me that her legs look more swollen then when you last saw her in the office, and there are blisters on her legs now  She states she would like to see a lymphedema specialist  Please advise  Thank you!

## 2018-12-11 ENCOUNTER — EVALUATION (OUTPATIENT)
Dept: PHYSICAL THERAPY | Facility: CLINIC | Age: 83
End: 2018-12-11
Payer: MEDICARE

## 2018-12-11 DIAGNOSIS — I89.0 LYMPHEDEMA: Primary | ICD-10-CM

## 2018-12-11 DIAGNOSIS — R60.0 LOCALIZED EDEMA: ICD-10-CM

## 2018-12-11 PROCEDURE — 97163 PT EVAL HIGH COMPLEX 45 MIN: CPT | Performed by: PHYSICAL THERAPIST

## 2018-12-11 PROCEDURE — G8978 MOBILITY CURRENT STATUS: HCPCS | Performed by: PHYSICAL THERAPIST

## 2018-12-11 PROCEDURE — G8979 MOBILITY GOAL STATUS: HCPCS | Performed by: PHYSICAL THERAPIST

## 2018-12-11 NOTE — PROGRESS NOTES
PT Evaluation     Today's date: 2018  Patient name: Maira Zeng  : 1935  MRN: [id number]  Referring provider: Erica Cardozo DO  Dx:   Encounter Diagnosis     ICD-10-CM    1  Lymphedema I89 0    2  Localized edema R60 0 Ambulatory referral to Physical Therapy                  Assessment  Assessment details: Pt evaluated this day regarding severe B LE PN and worsened B LE edema w/ chronic hx of B LE edema likely associated w/ PMHx including failed spinal fusion, SCI, and dependent edema of B LE given pt's sedentary state requiring 1:1 care, wheelchair dependent w/ inability to tolerate laying supine  Baseline measurements, + stemmer's sign, pitting, and marked fibrosis of B LE, along w/ hemosiderin staining and significant skin changes including dry scaly skin, redness, and presence of numerous L>R LE blisters + B foot dorsal buffalo hump all suggest exacerbation of LE lymphedema  Skilled PT is necessary to address,however there is concern regarding transportation issue and transfer ability as pt is WC dependent and unable to tolerate supine positioning also requiring Max A x1 for change of clothes, donning garments, and to transfer  Pt may benefit and be more appropriate for home care skilled lymphedema CDT protocol if suitable lymphedema therapist can be located for home care within timely boundaries  Barriers to therapy: Transportation, poor health status, inability to maintain supine positioning, dependency for locomotion and transfers  Understanding of Dx/Px/POC: good   Prognosis: fair    Goals  ST  Reduce PN <4/10 w/ all activity within 3 wks  2  Reduce B LE girth measurements >1 cm within 3 wks  LT  Maintain B LE solaris ready wraps daily within 5 wks  2  Reduce PN <2/10 w/ all activity within 5 wks   3   Able to apply B LE lotion daily and self MLD techniques as needed within 5 wks    Plan  Patient would benefit from: skilled physical therapy  Referral necessary: Yes  Planned therapy interventions: manual therapy, massage and compression  Frequency: 2x week  Duration in visits: 10  Duration in weeks: 5  Plan of Care beginning date: 2018  Plan of Care expiration date: 1/15/2019  Treatment plan discussed with: patient, family and caregiver        Subjective Evaluation    History of Present Illness  Mechanism of injury: Pt presents w/ c/o significantly worsened B LE edema w/ inability to don LE compression stockings due to increased edema, worsened PN, and sensitivity to skin of B lower legs  Pt admits chronic hx of B LE edema  Pt also presents w/ numerous challenges including wheelchair dependent and requiring constant 1:1 care as  is primary caretaker w/ home health aides also providing assistance  Pt had in the past received home health skilled PT/ymphedema treatment w/ successful management  Recurrent probem    Quality of life: fair    Pain  Current pain ratin  At best pain ratin  At worst pain ratin  Location: B LE  Quality: burning, cramping, discomfort, sharp, tight and squeezing    Treatments  Previous treatment: physical therapy  Patient Goals  Patient goals for therapy: decreased edema, decreased pain and increased motion          Objective     General Comments     Ankle/Foot Comments   Physical assessment:  Palpation: Moderate ttp R lower leg, mod to severe L lower leg, more significant posteriorly  Skin Mobility: Poor B lower legs w/ moderate fibrosis, L>R  Skin color: Marked redness + hemosiderin staining B lower legs from just below tibial tubercle to dorsum of B feet  Pitting: +1  Wound presence:  L LE, numerous blisters, closed  Wound size: n/a  Wound color: n/a  Stemmer's Sign:  + B LE  Gait assessment: wheelchair dependent  Transfer status: Max A x1  Ability to don/doff clothing/garments:  Mod A      Flowsheet Rows      Most Recent Value   PT/OT G-Codes   Current Score  12   Projected Score  34   Assessment Type  Evaluation   G code set Mobility: Walking & Moving Around   Mobility: Walking and Moving Around Current Status ()  CM   Mobility: Walking and Moving Around Goal Status ()  CL        Flowsheet Rows      Most Recent Value   girth measurments   Extremity   Lower extremity   LE Girth Measurments  Forefoot, Ankle Figure 8, Malleoli, M+10cm, M+20cm, M+30cm, M+40cm   Forefoot   L Forefoot Initial girth  24 75 cm   R Forefoot Initial girth  26 2   Ankle Figure 8   L Ankle Figure 8 Initial girth  56 cm   R Ankle Figure 8 Initial girth  56 cm   Malleoli   L Malleoli Initial girth  26 cm   R Malleoli Initial girth  28 cm   M+10cm    L M+10cm Initial girth  32 5 cm   R M+10cm Initial girth  29 25 cm   M+20cm    L M+20cm Initial girth  44 25 cm   R M+20cm Initial girth  41 25 cm   M+30cm    L M+30cm Initial girth  41 cm   R M+30cm Initial girth  39 5 cm   M+40cm    L M+40cm Initial girth  48 cm   R M+40cm Initial girth  47 cm          Precautions: LOB/FALL RISK, WHEELCHAIR BOUND     Daily Treatment Diary     Manual  12-11            Modified B LE MLD  NV            B LE solaris ready wraps NV                                                       Exercise Diary                                                                                                                                                                                                                                                                                      Modalities

## 2018-12-12 DIAGNOSIS — R60.0 LOCALIZED EDEMA: Primary | ICD-10-CM

## 2018-12-18 ENCOUNTER — OFFICE VISIT (OUTPATIENT)
Dept: PHYSICAL THERAPY | Facility: CLINIC | Age: 83
End: 2018-12-18
Payer: MEDICARE

## 2018-12-18 ENCOUNTER — APPOINTMENT (OUTPATIENT)
Dept: PHYSICAL THERAPY | Facility: CLINIC | Age: 83
End: 2018-12-18
Payer: MEDICARE

## 2018-12-18 DIAGNOSIS — R60.0 LOCALIZED EDEMA: Primary | ICD-10-CM

## 2018-12-18 DIAGNOSIS — I89.0 LYMPHEDEMA: ICD-10-CM

## 2018-12-18 PROCEDURE — 97140 MANUAL THERAPY 1/> REGIONS: CPT | Performed by: PHYSICAL THERAPIST

## 2018-12-18 NOTE — PROGRESS NOTES
Daily Note     Today's date: 2018  Patient name: Honorio Singh  : 1935  MRN: [id number]  Referring provider: Sergio Rosales DO  Dx:   Encounter Diagnosis     ICD-10-CM    1  Localized edema R60 0    2  Lymphedema I89 0                   Subjective: ***      Objective: See treatment diary below      Assessment: Tolerated treatment {Tolerated treatment :[id number]}   Patient {assessment:9857739955}      Plan: {PLAN:[id number]}

## 2018-12-19 ENCOUNTER — TELEPHONE (OUTPATIENT)
Dept: FAMILY MEDICINE CLINIC | Facility: CLINIC | Age: 83
End: 2018-12-19

## 2018-12-19 DIAGNOSIS — R60.0 LOCALIZED EDEMA: Primary | ICD-10-CM

## 2018-12-19 NOTE — TELEPHONE ENCOUNTER
Billy Weathers called back into the office and stated that she is not sure if they are even taking new pt's,and they do not do any stat care it and even take up to a week before pt can be seen  Billy Weathers said the best thing to do is call back intake in the morning at 8am  I did fax over the referral to [phone]

## 2018-12-19 NOTE — TELEPHONE ENCOUNTER
I called [facility] requesting they call parisi back pertaining to a stat order I just placed  I just want to confirm it is correct and that someone will be taking care of this and reaching out to the pt's   Order was placed for home care for [facility] (need lymphedema specialist patient is in a wheel chair and the edema is painful  They are paging Catalino Tilley and having her contact our office

## 2018-12-19 NOTE — PROGRESS NOTES
Daily Note     Today's date: 2018  Patient name: Kamila Marcum  : 1935  MRN: [id number]  Referring provider: Erika Bauer DO  Dx:   Encounter Diagnosis     ICD-10-CM    1  Localized edema R60 0    2  Lymphedema I89 0                   Subjective: Pt presents this day w/ B LE ready wraps in possession  C/o severe B LE PN, cont'd edema and worsened blistering of skin  Pt has not yet heard back re: home care potential for lymph tx  Therapist attempts to f/u w/ PCP but he is out of office remainder of wk  Will f/u w/ PCP office tomorrow 12-  Pt's  cleaning pt's B LE daily and maintaining neosporin  Objective: See treatment diary below      Assessment: Tolerated treatment fair  Patient would benefit from continued PT  Pt advised to d/c neosporin for time being, provided size E tubigrip B LE and instructed pt and  (primary caretaker) in donning for B LE solaris ready wraps  Poor carmelita to palpation B LE w/ marked blistering of B LE skin, worse L anterior lower leg w/ comprifoam utilized w/ under ready wrap in that region  To f/u w/ pt tomorrow regarding tolerance and response to wraps, also to better determine possibility of homecare vs need to attempt visits outpt  Plan: Continue per plan of care  Precautions: WC bound, requires Max A to transfer, cant carmelita supine, SCI  B LE lymphedema     Daily Treatment Diary     Manual              B LE solaris ready wrap donning + instruction x20'                                                                    Exercise Diary                                                                                                                                                                                                                                                                                      Modalities

## 2018-12-19 NOTE — TELEPHONE ENCOUNTER
Leeann Beal called and lmom regarding that he is waiting to hear if a lymphedema specialist will be contacting them to schedule an appointment to visit his wfie in his home  Found out that the order that was placed as an out patient physical therapist order  I placed a home order [facility] (need lymphedema specialist)  Order was placed as a stat since pt is in pain  Bryan Burton contacted pt's  Leeann Beal and advised we are currently working on it and someone should be contacting hem directly

## 2018-12-20 ENCOUNTER — OFFICE VISIT (OUTPATIENT)
Dept: PHYSICAL THERAPY | Facility: CLINIC | Age: 83
End: 2018-12-20
Payer: MEDICARE

## 2018-12-20 DIAGNOSIS — I89.0 LYMPHEDEMA: ICD-10-CM

## 2018-12-20 DIAGNOSIS — R60.0 LOCALIZED EDEMA: Primary | ICD-10-CM

## 2018-12-20 PROCEDURE — 97140 MANUAL THERAPY 1/> REGIONS: CPT | Performed by: PHYSICAL THERAPIST

## 2018-12-20 NOTE — TELEPHONE ENCOUNTER
Currently home health intake dept  is in a meeting and will be available after 10:30 am  [facility], called unfortunately they do not do it we asked if they know of anyone advised to call Rafael Cornelius  I called [facility] unfortunately they do not offer this service yet for pt home care for pt for a lymphedema specialist it is on their radar  They stated maybe [place]  Calling  home care to see if they offer this service  I called  home care and unfortunately they no longer offer home care pt for a lymphedema specialists they stopped doing this 2-3 months ago  Currently I am at a stand still I called [facility] and [facility] and even [facility] no one offers this service

## 2018-12-20 NOTE — TELEPHONE ENCOUNTER
I do not think we will be able to arrange home lymphedema treatment  I think the only option would be for them to go to an outpatient physical therapist to receive that treatment  I do not see any other option

## 2018-12-20 NOTE — PROGRESS NOTES
Daily Note     Today's date: 2018  Patient name: Ildefonso Kumar  : 1935  MRN: [id number]  Referring provider: Tu Chapman DO  Dx:   Encounter Diagnosis     ICD-10-CM    1  Localized edema R60 0    2  Lymphedema I89 0                   Subjective: Pt admits a mild reduction in PN B LE, also her  noted reduction in severity of blisters  Objective: See treatment diary below      Assessment: Tolerated treatment fair  Patient would benefit from continued PT Pt responds fair to initial MLD tx this day, modified w/ pt seated in power chair w/ x1 leg elevated at a time on stool and pillows  Plan to cont care as home care tx not an option  Plan: Continue per plan of care  Precautions: WC bound, requires Max A to transfer, cant carmelita supine, SCI  B LE lymphedema     Daily Treatment Diary     Manual   12-20           B LE solaris ready wrap donning + instruction x20' x15'           MLD B LE - modified tx  x40'                                                      Exercise Diary                                                                                                                                                                                                                                                                                      Modalities

## 2018-12-20 NOTE — TELEPHONE ENCOUNTER
I called and spoke to [facility] advised her that after speaking with Dr Frederick since Rosita Samira is out of the office I called a few different places trying to get her a home pt lymphedema specialist and it looks like the best option we have is for her to continue seeing Carmencita Linda as out pt no one does this in the  right now  Pt stated it is difficult  but she does agree that this is the best option  I did inform her I tried our network,  and even [facility]  and unfortunately no one does this currently  I advised Pt I would call and let Carmencita Linda know  I called [facility] next door and left a message with Jocelynn Brock for Carmencita Linda whom pt is seeing  today around 3:00 pm   I advised her per Rosita Hogan we were trying to find patient a home care physical therapist superficially a lymphedema specialist and unfortunately our health  Network,  and [facility] do not offer this  Best this to do after speaking with Dr Macias would be for pt to continue to see Carmencita Linda out pt  I informed I spoke to [facility] directly and she said it may be difficult but I do agree that is what is best  Carmencitamoi Linda is to call with any questions

## 2018-12-27 ENCOUNTER — OFFICE VISIT (OUTPATIENT)
Dept: PHYSICAL THERAPY | Facility: CLINIC | Age: 83
End: 2018-12-27
Payer: MEDICARE

## 2018-12-27 DIAGNOSIS — I89.0 LYMPHEDEMA: ICD-10-CM

## 2018-12-27 DIAGNOSIS — R60.0 LOCALIZED EDEMA: Primary | ICD-10-CM

## 2018-12-27 PROCEDURE — 97140 MANUAL THERAPY 1/> REGIONS: CPT | Performed by: PHYSICAL THERAPIST

## 2018-12-27 NOTE — PROGRESS NOTES
Daily Note     Today's date: 2018  Patient name: Ayleen Lopez  : 1935  MRN: [id number]  Referring provider: Betty Leslie DO  Dx:   Encounter Diagnosis     ICD-10-CM    1  Localized edema R60 0    2  Lymphedema I89 0                   Subjective: Pt admits a mild improvement following last session, wearing leg wraps daily approx 9-10 hrs  Objective: See treatment diary below      Assessment: Tolerated treatment fair  Patient would benefit from continued PT Reduced blistering and mild red in skin fibrosis B LE, color also improving  Plan: Continue per plan of care  Precautions: WC bound, requires Max A to transfer, cant carmelita supine, SCI  B LE lymphedema     Daily Treatment Diary     Manual            B LE solaris ready wrap donning + instruction x20' x15' x10'          MLD B LE - modified tx  x40' x35'                                                     Exercise Diary                                                                                                                                                                                                                                                                                      Modalities

## 2019-01-03 ENCOUNTER — OFFICE VISIT (OUTPATIENT)
Dept: PHYSICAL THERAPY | Facility: CLINIC | Age: 84
End: 2019-01-03
Payer: MEDICARE

## 2019-01-03 DIAGNOSIS — I89.0 LYMPHEDEMA: ICD-10-CM

## 2019-01-03 DIAGNOSIS — R60.0 LOCALIZED EDEMA: Primary | ICD-10-CM

## 2019-01-03 PROCEDURE — 97140 MANUAL THERAPY 1/> REGIONS: CPT | Performed by: PHYSICAL THERAPIST

## 2019-01-04 DIAGNOSIS — R60.9 EDEMA, UNSPECIFIED TYPE: ICD-10-CM

## 2019-01-04 RX ORDER — FUROSEMIDE 40 MG/1
40 TABLET ORAL 2 TIMES DAILY
Qty: 180 TABLET | Refills: 0 | Status: SHIPPED | OUTPATIENT
Start: 2019-01-04 | End: 2019-04-13 | Stop reason: SDUPTHER

## 2019-01-07 ENCOUNTER — OFFICE VISIT (OUTPATIENT)
Dept: PHYSICAL THERAPY | Facility: CLINIC | Age: 84
End: 2019-01-07
Payer: MEDICARE

## 2019-01-07 DIAGNOSIS — R60.0 LOCALIZED EDEMA: Primary | ICD-10-CM

## 2019-01-07 DIAGNOSIS — I89.0 LYMPHEDEMA: ICD-10-CM

## 2019-01-07 PROCEDURE — 97140 MANUAL THERAPY 1/> REGIONS: CPT | Performed by: PHYSICAL THERAPIST

## 2019-01-07 NOTE — PROGRESS NOTES
Daily Note     Today's date: 2019  Patient name: Tomasz Greco  : 1935  MRN: [id number]  Referring provider: Fam Griffin DO  Dx:   Encounter Diagnosis     ICD-10-CM    1  Localized edema R60 0    2  Lymphedema I89 0                   Subjective: Pt reports more severe PN x 2 days now L leg >R leg, limited tolerance to wraps the last 2 days  PN more constant than previously, but today not as bad earlier but aggravated by preparing to come for appt  Objective: See treatment diary below      Assessment: Tolerated treatment fair  Patient would benefit from continued PT Cont'd improvement in skin integrity R>L LE w/ volume change stagnant this day as pt has less consistent use of wraps due to PN  Plan: Continue per plan of care  Precautions: WC bound, requires Max A to transfer, cant carmelita supine, SCI  B LE lymphedema     Daily Treatment Diary     Manual  12 12-20 1227 1-3 1-7        B LE solaris ready wrap donning + instruction x20' x15' x10' x10' x10'        MLD B LE - modified tx  x40' x35' x40' x45'                                                   Exercise Diary                                                                                                                                                                                                                                                                                      Modalities

## 2019-01-10 ENCOUNTER — OFFICE VISIT (OUTPATIENT)
Dept: PHYSICAL THERAPY | Facility: CLINIC | Age: 84
End: 2019-01-10
Payer: MEDICARE

## 2019-01-10 DIAGNOSIS — R60.0 LOCALIZED EDEMA: Primary | ICD-10-CM

## 2019-01-10 DIAGNOSIS — I89.0 LYMPHEDEMA: ICD-10-CM

## 2019-01-10 PROCEDURE — 97140 MANUAL THERAPY 1/> REGIONS: CPT | Performed by: PHYSICAL THERAPIST

## 2019-01-10 NOTE — PROGRESS NOTES
Daily Note     Today's date: 1/10/2019  Patient name: Angel Gonzales  : 1935  MRN: [id number]  Referring provider: Kandace Workman DO  Dx:   Encounter Diagnosis     ICD-10-CM    1  Localized edema R60 0    2  Lymphedema I89 0                   Subjective: Pt c/o PN in B LE preventing her from maintaining wraps all day yesterday  Objective: See treatment diary below      Assessment: Tolerated treatment fair  Patient would benefit from continued PT Blistering noted B LE, worse R lower anterior leg w/ clear exudate present during MLD  Advised pt to maintain B LE compression wraps  Plan: Continue per plan of care  Precautions: WC bound, requires Max A to transfer, cant carmelita supine, SCI  B LE lymphedema     Daily Treatment Diary     Manual  12-18 12-20 12-27 1-3 1-7 1-10       B LE solaris ready wrap donning + instruction x20' x15' x10' x10' x10' x10'       MLD B LE - modified tx  x40' x35' x40' x45' x45'                                                  Exercise Diary                                                                                                                                                                                                                                                                                      Modalities

## 2019-01-14 ENCOUNTER — OFFICE VISIT (OUTPATIENT)
Dept: PHYSICAL THERAPY | Facility: CLINIC | Age: 84
End: 2019-01-14
Payer: MEDICARE

## 2019-01-14 DIAGNOSIS — I89.0 LYMPHEDEMA: ICD-10-CM

## 2019-01-14 DIAGNOSIS — R60.0 LOCALIZED EDEMA: Primary | ICD-10-CM

## 2019-01-14 PROCEDURE — 97140 MANUAL THERAPY 1/> REGIONS: CPT | Performed by: PHYSICAL THERAPIST

## 2019-01-14 NOTE — PROGRESS NOTES
Daily Note     Today's date: 2019  Patient name: Latanya Lozano  : 1935  MRN: [id number]  Referring provider: Mary Soriano DO  Dx:   Encounter Diagnosis     ICD-10-CM    1  Localized edema R60 0    2  Lymphedema I89 0                   Subjective: Pt able to maintain wraps daily and cites less PN  Objective: See treatment diary below      Assessment: Tolerated treatment fair  Patient would benefit from continued PT Reduced blistering noted and overall vol reduction, however R LE clear exudate remains anterior lower leg within 2cm x2cm area  Plan: Continue per plan of care  Precautions: WC bound, requires Max A to transfer, cant carmelita supine, SCI  B LE lymphedema     Daily Treatment Diary     Manual  1218 12-20 12-27 1-3 1-7 1-10 1-14      B LE solaris ready wrap donning + instruction x20' x15' x10' x10' x10' x10' x10'      MLD B LE - modified tx  x40' x35' x40' x45' x45' x45'                                                 Exercise Diary                                                                                                                                                                                                                                                                                      Modalities

## 2019-01-17 ENCOUNTER — OFFICE VISIT (OUTPATIENT)
Dept: PHYSICAL THERAPY | Facility: CLINIC | Age: 84
End: 2019-01-17
Payer: MEDICARE

## 2019-01-17 DIAGNOSIS — R60.0 LOCALIZED EDEMA: Primary | ICD-10-CM

## 2019-01-17 DIAGNOSIS — I89.0 LYMPHEDEMA: ICD-10-CM

## 2019-01-17 PROCEDURE — 97140 MANUAL THERAPY 1/> REGIONS: CPT | Performed by: PHYSICAL THERAPIST

## 2019-01-17 NOTE — PROGRESS NOTES
Daily Note     Today's date: 2019  Patient name: Weston Schofield  : 1935  MRN: [id number]  Referring provider: Edel Coe DO  Dx:   Encounter Diagnosis     ICD-10-CM    1  Localized edema R60 0    2  Lymphedema I89 0                   Subjective: Pt admits legs are doing a little better  Objective: See treatment diary below      Assessment: Tolerated treatment fair  Patient would benefit from continued PT Good tolerance to MLD, not yet willing to return to using compression pump at home citing it aggravates her legs more  Plan: Continue per plan of care  Precautions: WC bound, requires Max A to transfer, cant carmelita supine, SCI  B LE lymphedema     Daily Treatment Diary     Manual  12 12- 12-27 1-3 1-7 1-10 1-14 1-17     B LE solaris ready wrap donning + instruction x20' x15' x10' x10' x10' x10' x10' x10'     MLD B LE - modified tx  x40' x35' x40' x45' x45' x45' x45'                                                Exercise Diary                                                                                                                                                                                                                                                                                      Modalities

## 2019-01-18 ENCOUNTER — TELEPHONE (OUTPATIENT)
Dept: FAMILY MEDICINE CLINIC | Facility: CLINIC | Age: 84
End: 2019-01-18

## 2019-01-18 DIAGNOSIS — M19.90 ARTHRITIS: ICD-10-CM

## 2019-01-18 RX ORDER — CELECOXIB 200 MG/1
200 CAPSULE ORAL 2 TIMES DAILY
Qty: 60 CAPSULE | Refills: 2 | Status: SHIPPED | OUTPATIENT
Start: 2019-01-18 | End: 2019-05-28 | Stop reason: SDUPTHER

## 2019-01-18 NOTE — TELEPHONE ENCOUNTER
Pt called and stated she was prescribed Celebrex  to take 1 time a day  She started taking it 2 x a day for a week  She stated it is helping and wants to know if the script can be changed so she can take it 2x a day  Asked pt if she was advised by another Dr to take it 2x a day and she stated no, the 1x wasn't helping so she increased it to see if it would help  Advised pt I would send a message to the Dr but most likely she would need to come in to see him to be re-evaluated before changing the script  Asked pt if she wanted to schedule that now, she stated no she is very handicapped and it is hard to get out  Advised pt that is fine, we wouldn't schedule now, but I would call her back as soon as I hear from the Dr  Please advise, thank you

## 2019-01-18 NOTE — TELEPHONE ENCOUNTER
Called pt, spoke with Lawanda Dwyer, consent ok  He stated pt was in the bathroom and asked to take a message  Advised Dr Merline Roller did call in a revised script for Celebrex for the pt to take 2x a day  However, he would like her to try 1x a day and do 2 a day as needed  He would still like her to try and use 1 a day and doesn't want her using 2 a day everyday  Johanne Jones stated ok, thank you  He will get her the message

## 2019-01-18 NOTE — TELEPHONE ENCOUNTER
Call patient  I sent a new prescription for Celebrex to be taken twice daily  But I would still recommend using it sparingly    If she can get away with taking it once a day on some days, please do so

## 2019-01-21 ENCOUNTER — APPOINTMENT (OUTPATIENT)
Dept: PHYSICAL THERAPY | Facility: CLINIC | Age: 84
End: 2019-01-21
Payer: MEDICARE

## 2019-01-22 ENCOUNTER — OFFICE VISIT (OUTPATIENT)
Dept: FAMILY MEDICINE CLINIC | Facility: CLINIC | Age: 84
End: 2019-01-22
Payer: MEDICARE

## 2019-01-22 VITALS
TEMPERATURE: 97.5 F | OXYGEN SATURATION: 96 % | RESPIRATION RATE: 17 BRPM | HEART RATE: 73 BPM | DIASTOLIC BLOOD PRESSURE: 80 MMHG | SYSTOLIC BLOOD PRESSURE: 150 MMHG

## 2019-01-22 DIAGNOSIS — J06.9 UPPER RESPIRATORY TRACT INFECTION, UNSPECIFIED TYPE: Primary | ICD-10-CM

## 2019-01-22 PROCEDURE — 99213 OFFICE O/P EST LOW 20 MIN: CPT | Performed by: FAMILY MEDICINE

## 2019-01-22 RX ORDER — AZITHROMYCIN 250 MG/1
TABLET, FILM COATED ORAL
Qty: 6 TABLET | Refills: 0 | Status: SHIPPED | OUTPATIENT
Start: 2019-01-22 | End: 2019-01-26

## 2019-01-22 NOTE — PROGRESS NOTES
[facility]      NAME: Clemente Saleh  AGE: 80 y o  SEX: female  : 1935   MRN: [id number]  P r n  DATE: 2019  TIME: 6:48 PM    Assessment and Plan     Problem List Items Addressed This Visit     None      Visit Diagnoses     Upper respiratory tract infection, unspecified type    -  Primary    Relevant Medications    azithromycin (ZITHROMAX) 250 mg tablet              Return to office in:  P r n  Chief Complaint     Chief Complaint   Patient presents with   Jorge Thai Like Symptoms     pt is blowing blood out her nose/post nasal drip/has to clear her throat alot states it started on Saturday       History of Present Illness     Patient complains of a 3 day history of nasal congestion postnasal drip and mild cough  Denies fever chills and shortness of breath  The following portions of the patient's history were reviewed and updated as appropriate: allergies, current medications, past family history, past medical history, past social history, past surgical history and problem list     Review of Systems   Review of Systems   Constitutional: Negative  HENT: Positive for congestion, postnasal drip and rhinorrhea  Respiratory: Negative  Cardiovascular: Negative  Gastrointestinal: Negative  Genitourinary: Negative  Musculoskeletal: Negative  Psychiatric/Behavioral: Negative  Active Problem List     Patient Active Problem List   Diagnosis    Pain syndrome, chronic    Degenerative disc disease, cervical    Crohn disease ([address] )    Spinal stenosis    BRBPR (bright red blood per rectum)    MS (multiple sclerosis) ([facility])    Spinal cord injury    Edema    Nummular eczema    Arthritis       Objective   /80 (BP Location: Left arm, Patient Position: Sitting, Cuff Size: Adult)   Pulse 73   Temp 97 5 °F (36 4 °C) (Tympanic)   Resp 17   SpO2 96%     Physical Exam   Constitutional: She is oriented to person, place, and time   She appears well-developed and well-nourished  No distress  HENT:   Head: Normocephalic and atraumatic  Injected pharynx with postnasal drainage   Eyes: Pupils are equal, round, and reactive to light  Conjunctivae are normal  Right eye exhibits no discharge  Neck: Normal range of motion  No thyromegaly present  Cardiovascular: Normal rate and regular rhythm  Pulmonary/Chest: Effort normal and breath sounds normal  No respiratory distress  Lymphadenopathy:     She has no cervical adenopathy  Neurological: She is alert and oriented to person, place, and time  Skin: Skin is warm and dry  She is not diaphoretic  Psychiatric: She has a normal mood and affect  Her behavior is normal  Judgment and thought content normal    Nursing note and vitals reviewed  Current Medications     Current Outpatient Prescriptions:     betamethasone, augmented, (DIPROLENE-AF) 0 05 % cream, Apply topically 2 (two) times a day, Disp: 30 g, Rfl: 1    Bioflavonoid Products (AMANDO C PO), Take 1 tablet by mouth daily  , Disp: , Rfl:     Biotin 1000 MCG tablet, Take 1,000 mcg by mouth daily  , Disp: , Rfl:     brinzolamide (AZOPT) 1 % ophthalmic suspension, USE 1 DROP IN THE LEFT EYE THREE TIMES A DAY, Disp: , Rfl:     Calcium Carbonate (CALTRATE 600 PO), Take 2 tablets by mouth daily  , Disp: , Rfl:     Carboxymethylcell-Hypromellose (GENTEAL OP), Apply 1 drop to eye daily at bedtime  Both eyes, Disp: , Rfl:     celecoxib (CeleBREX) 200 mg capsule, Take 1 capsule (200 mg total) by mouth 2 (two) times a day, Disp: 60 capsule, Rfl: 2    Cyanocobalamin (VITAMIN B-12 PO), Take 1 tablet by mouth daily  , Disp: , Rfl:     diclofenac sodium (VOLTAREN) 1 %, Apply 2 g topically as needed  , Disp: , Rfl:     diclofenac-misoprostol (ARTHROTEC) 75-0 2 MG per tablet, TAKE ONE TABLET BY MOUTH EVERY DAY, Disp: , Rfl:     escitalopram (LEXAPRO) 5 mg tablet, Take 5 mg by mouth   2 times a week for depression, Disp: , Rfl:     furosemide (LASIX) 40 mg tablet, Take 1 tablet (40 mg total) by mouth 2 (two) times a day, Disp: 180 tablet, Rfl: 0    lidocaine (LIDODERM) 5 %, Place 1 patch on the skin daily as needed for mild pain  Remove & Discard patch within 12 hours or as directed by MD, Disp: , Rfl:     Magnesium 250 MG TABS, Take 1 tablet by mouth 2 (two) times a day , Disp: , Rfl:     mesalamine (LIALDA) 1 2 G EC tablet, Take 2,400 mg by mouth daily with breakfast , Disp: , Rfl:     methocarbamol (ROBAXIN) 500 mg tablet, Take by mouth 4 times daily, Disp: , Rfl:     metolazone (ZAROXOLYN) 2 5 mg tablet, Take 1 tablet (2 5 mg total) by mouth daily, Disp: 30 tablet, Rfl: 2    Misc Natural Products (OSTEO BI-FLEX ADV DOUBLE ST PO), Take 1 tablet by mouth daily  , Disp: , Rfl:     Multiple Vitamins-Minerals (CENTRUM ADULTS PO), Take 1 tablet by mouth daily  , Disp: , Rfl:     Nutritional Supplements (VITAMIN D MAINTENANCE PO), Take 400 Int'l Units by mouth daily  , Disp: , Rfl:     nystatin (MYCOSTATIN) cream, Apply topically 2 (two) times a day Underneath breasts, Disp: 30 g, Rfl: 0    Omega-3 Fatty Acids (FISH OIL) 1200 MG CAPS, Take 1 tablet by mouth daily  , Disp: , Rfl:     Omeprazole 20 MG TBEC, Take 1 tablet by mouth daily  , Disp: , Rfl:     ondansetron (ZOFRAN) 8 mg tablet, Take 8 mg by mouth every 8 (eight) hours as needed for nausea or vomiting , Disp: , Rfl:     Polyethyl Glycol-Propyl Glycol (SYSTANE ULTRA OP), Apply to eye 3 (three) times a day  Both eyes, Disp: , Rfl:     potassium chloride (MICRO-K) 10 MEQ CR capsule, 2 caps qd, Disp: 60 capsule, Rfl: 05    PrednisoLONE Acetate (PRED MILD OP), Apply 1 drop to eye every other day  Left eye, Disp: , Rfl:     pregabalin (LYRICA) 75 mg capsule, Take 75 mg by mouth 2 (two) times a day , Disp: , Rfl:     Probiotic Product (SUPER PROBIOTIC DIGESTIVE PO), Take 1 tablet by mouth daily  , Disp: , Rfl:     sodium chloride () 2 % hypertonic ophthalmic solution, 1 drop 5 (five) times a day , Disp: , Rfl:     sodium chloride () 5 % hypertonic ophthalmic solution, Administer 2 drops into the left eye as needed  , Disp: , Rfl:     traMADol (ULTRAM) 50 mg tablet, Take 1 tablet by mouth every 6 (six) hours as needed for moderate pain, Disp: 10 tablet, Rfl: 0    vitamin E, tocopherol, 400 units capsule, Take 400 Units by mouth daily  , Disp: , Rfl:     azithromycin (ZITHROMAX) 250 mg tablet, Take 2 tablets today then 1 tablet daily x 4 days, Disp: 6 tablet, Rfl: 0    Health Maintenance     Health Maintenance   Topic Date Due    Medicare Annual Wellness Visit (AWV)  1935    DTaP,Tdap,and Td Vaccines (1 - Tdap) 05/04/1956    Fall Risk  05/04/2000    Urinary Incontinence Screening  05/04/2000    PT PLAN OF CARE  01/10/2019    Depression Screening PHQ  10/05/2019    INFLUENZA VACCINE  Completed    Pneumococcal PPSV23/PCV13 65+ Years / Low and Medium Risk  Completed     Immunization History   Administered Date(s) Administered     Influenza (IM) Preservative Free 1935    Influenza Split High Dose Preservative Free IM 09/29/2014, 10/14/2015, 10/30/2017    Influenza, high dose seasonal 0 5 mL 10/24/2018    Pneumococcal Conjugate 13-Valent 03/18/2016    Pneumococcal Polysaccharide PPV23 10/24/2018       Radha Duran DO  [facility]

## 2019-01-24 ENCOUNTER — OFFICE VISIT (OUTPATIENT)
Dept: PHYSICAL THERAPY | Facility: CLINIC | Age: 84
End: 2019-01-24
Payer: MEDICARE

## 2019-01-24 DIAGNOSIS — R60.0 LOCALIZED EDEMA: Primary | ICD-10-CM

## 2019-01-24 DIAGNOSIS — I89.0 LYMPHEDEMA: ICD-10-CM

## 2019-01-24 PROCEDURE — 97140 MANUAL THERAPY 1/> REGIONS: CPT | Performed by: PHYSICAL THERAPIST

## 2019-01-24 NOTE — PROGRESS NOTES
Daily Note     Today's date: 2019  Patient name: Reji Blunt  : 1935  MRN: [id number]  Referring provider: Sri Lau DO  Dx:   Encounter Diagnosis     ICD-10-CM    1  Localized edema R60 0    2  Lymphedema I89 0                   Subjective: Pt reports severe PN today, both legs, but most severe in LB  Objective: See treatment diary below      Assessment: Tolerated treatment fair  Patient would benefit from continued PT Cont to avoid compression pump at home  Overall reduced vol noted B ankles but mod in feet thru dorsal aspect  Cont'd fibrosis, blistering, and scaly skin, but overall progressing  Plan: Continue per plan of care  Precautions: WC bound, requires Max A to transfer, cant carmelita supine, SCI  B LE lymphedema     Daily Treatment Diary     Manual   12 12 1-3 1-7 1-10 1-14 1-17 24    B LE solaris ready wrap donning + instruction x20' x15' x10' x10' x10' x10' x10' x10' x8'    MLD B LE - modified tx  x40' x35' x40' x45' x45' x45' x45' x40'                                               Exercise Diary                                                                                                                                                                                                                                                                                      Modalities

## 2019-01-28 ENCOUNTER — OFFICE VISIT (OUTPATIENT)
Dept: PHYSICAL THERAPY | Facility: CLINIC | Age: 84
End: 2019-01-28
Payer: MEDICARE

## 2019-01-28 DIAGNOSIS — I89.0 LYMPHEDEMA: ICD-10-CM

## 2019-01-28 DIAGNOSIS — R60.0 LOCALIZED EDEMA: Primary | ICD-10-CM

## 2019-01-28 PROCEDURE — 97140 MANUAL THERAPY 1/> REGIONS: CPT | Performed by: PHYSICAL THERAPIST

## 2019-01-28 NOTE — PROGRESS NOTES
Daily Note     Today's date: 2019  Patient name: Lawanda Quiroz  : 1935  MRN: [id number]  Referring provider: Zully Tompkins DO  Dx:   Encounter Diagnosis     ICD-10-CM    1  Localized edema R60 0    2  Lymphedema I89 0                   Subjective: Pt significant PN R calf OTW that continues, lessened intensity after taking PN medication  Pt states a "sharp PN that throbs" denies hx of DVT or any other sx's other than PN  Objective: See treatment diary below      Assessment: Tolerated treatment fair  Patient would benefit from continued PT  Observed R postero-lateral calf approx 5 cm distal to lateral knee jt a small 1 5cm x2cm blister that appears to have opened and is now closing/healing at source of where pt's PN c/o had been most severe and is current presently  Advised pt to monitor and apply antibiotic lotion  Plan: Continue per plan of care  Precautions: WC bound, requires Max A to transfer, cant carmelita supine, SCI  B LE lymphedema     Daily Treatment Diary     Manual  12 12- 12 1-3 1-7 1-10 1-14 -17    B LE solaris ready wrap donning + instruction x20' x15' x10' x10' x10' x10' x10' x10' x8' x10'   MLD B LE - modified tx  x40' x35' x40' x45' x45' x45' x45' x40' x40'                                              Exercise Diary                                                                                                                                                                                                                                                                                      Modalities

## 2019-01-29 ENCOUNTER — HOSPITAL ENCOUNTER (OUTPATIENT)
Dept: INFUSION CENTER | Facility: CLINIC | Age: 84
Discharge: HOME | End: 2019-01-29

## 2019-01-31 ENCOUNTER — APPOINTMENT (OUTPATIENT)
Dept: PHYSICAL THERAPY | Facility: CLINIC | Age: 84
End: 2019-01-31
Payer: MEDICARE

## 2019-02-04 ENCOUNTER — APPOINTMENT (OUTPATIENT)
Dept: PHYSICAL THERAPY | Facility: CLINIC | Age: 84
End: 2019-02-04
Payer: MEDICARE

## 2019-02-06 ENCOUNTER — HOSPITAL ENCOUNTER (OUTPATIENT)
Dept: INFUSION CENTER | Facility: CLINIC | Age: 84
Discharge: HOME | End: 2019-02-06
Payer: MEDICARE

## 2019-02-06 PROCEDURE — 96523 IRRIG DRUG DELIVERY DEVICE: CPT

## 2019-02-06 NOTE — PLAN OF CARE
Problem: Potential for Falls  Goal: Patient will remain free of falls  INTERVENTIONS:  - Assess patient frequently for physical needs  -  Identify cognitive and physical deficits and behaviors that affect risk of falls    -  [place] fall precautions as indicated by assessment   - Educate patient/family on patient safety including physical limitations  - Instruct patient to call for assistance with activity based on assessment  - Modify environment to reduce risk of injury  - Consider OT/PT consult to assist with strengthening/mobility   Outcome: Progressing

## 2019-02-07 ENCOUNTER — OFFICE VISIT (OUTPATIENT)
Dept: PHYSICAL THERAPY | Facility: CLINIC | Age: 84
End: 2019-02-07
Payer: MEDICARE

## 2019-02-07 DIAGNOSIS — R60.0 LOCALIZED EDEMA: Primary | ICD-10-CM

## 2019-02-07 DIAGNOSIS — I89.0 LYMPHEDEMA: ICD-10-CM

## 2019-02-07 PROCEDURE — 97140 MANUAL THERAPY 1/> REGIONS: CPT | Performed by: PHYSICAL THERAPIST

## 2019-02-07 NOTE — PROGRESS NOTES
Daily Note     Today's date: 2019  Patient name: Ayleen Lopez  : 1935  MRN: [id number]  Referring provider: Betty Leslie DO  Dx:   Encounter Diagnosis     ICD-10-CM    1  Localized edema R60 0    2  Lymphedema I89 0                   Subjective: PN 8/10 this week, constant w/ minimal affect from Rx medication  Objective: See treatment diary below    Assessment: Tolerated treatment fair  Patient would benefit from continued PT  Progressive vol reduction and skin integrity improvement w/ reduced blistering and most notable reduced redness t/o B lower legs  Plan: Continue per plan of care  Precautions: WC bound, requires Max A to transfer, cant carmelita supine, SCI  B LE lymphedema     Daily Treatment Diary     Manual  2-7            B LE solaris ready wrap donning + instruction [facility]         x10'   MLD B LE - modified tx [facility]         x40'                              x50'                Exercise Diary                                                                                                                                                                                                                                                                                      Modalities

## 2019-02-11 ENCOUNTER — OFFICE VISIT (OUTPATIENT)
Dept: PHYSICAL THERAPY | Facility: CLINIC | Age: 84
End: 2019-02-11
Payer: MEDICARE

## 2019-02-11 DIAGNOSIS — R60.0 LOCALIZED EDEMA: Primary | ICD-10-CM

## 2019-02-11 DIAGNOSIS — I89.0 LYMPHEDEMA: ICD-10-CM

## 2019-02-11 PROCEDURE — 97140 MANUAL THERAPY 1/> REGIONS: CPT | Performed by: PHYSICAL THERAPIST

## 2019-02-11 NOTE — PROGRESS NOTES
PT Re-Evaluation     Today's date: 2019  Patient name: Cheli Rojas  : 1935  MRN: [id number]  Referring provider: Clemente Ribeiro DO  Dx:   Encounter Diagnosis     ICD-10-CM    1  Localized edema R60 0    2  Lymphedema I89 0                   Assessment  Assessment details: Pt evaluated this day regarding severe B LE PN and worsened B LE edema w/ chronic hx of B LE edema likely associated w/ PMHx including failed spinal fusion, SCI, and dependent edema of B LE given pt's sedentary state requiring 1:1 care, wheelchair dependent w/ inability to tolerate laying supine  Baseline measurements, + stemmer's sign, pitting, and marked fibrosis of B LE, along w/ hemosiderin staining and significant skin changes including dry scaly skin, redness, and presence of numerous L>R LE blisters + B foot dorsal buffalo hump all suggest exacerbation of LE lymphedema  Skilled PT is necessary to address,however there is concern regarding transportation issue and transfer ability as pt is WC dependent and unable to tolerate supine positioning also requiring Max A x1 for change of clothes, donning garments, and to transfer  Pt may benefit and be more appropriate for home care skilled lymphedema CDT protocol if suitable lymphedema therapist can be located for home care within timely boundaries  19 Update:  Pt demonstrates improved B LE skin integrity, sig vol reduction B lower legs, however L>R foot edema that persists, worse this day despite overall improvements  Primary limiting factor remains severe B LE PN and dependence on seated positions as pt is unable to tolerate supine, contributing to dependent edema  Cont'd skilled PT is necessary to positively address limitations including B LE lymphedema and severe skin fibrosis  Barriers to therapy: Transportation, poor health status, inability to maintain supine positioning, dependency for locomotion and transfers     Understanding of Dx/Px/POC: good   Prognosis: fair    Goals  ST  Reduce PN <4/10 w/ all activity within 3 wks - Not met  2  Reduce B LE girth measurements >1 cm within 3 wks -MET  LT  Maintain B LE solaris ready wraps daily within 5 wks -Partially met  2  Reduce PN <2/10 w/ all activity within 5 wks  -Not met  3  Able to apply B LE lotion daily and self MLD techniques as needed within 5 wks -Partially met    Plan  Patient would benefit from: skilled physical therapy  Referral necessary: Yes  Planned therapy interventions: manual therapy, massage and compression  Frequency: 2x week  Duration in visits: 10  Duration in weeks: 5  Plan of Care beginning date: 2019  Plan of Care expiration date: 3/18/2019  Treatment plan discussed with: patient and family        Subjective Evaluation    History of Present Illness  Mechanism of injury: Pt presents w/ c/o significantly worsened B LE edema w/ inability to don LE compression stockings due to increased edema, worsened PN, and sensitivity to skin of B lower legs  Pt admits chronic hx of B LE edema  Pt also presents w/ numerous challenges including wheelchair dependent and requiring constant 1:1 care as  is primary caretaker w/ home health aides also providing assistance  Pt had in the past received home health skilled PT/ymphedema treatment w/ successful management  19 Update:  Pt admits overall improvement citing less edema t/o legs but still frustrated by blisters and overall poor skin integrity despite recent improvements  Pt also c/o PN as primary limiting factor w/ poor tolerance to maintainig wraps tightly on legs and some days unable to wear due to sensitivity             Recurrent probem    Quality of life: fair    Pain  Current pain ratin  At best pain ratin  At worst pain ratin  Location: B LE  Quality: burning, cramping, discomfort, sharp, tight and squeezing    Treatments  Previous treatment: physical therapy  Patient Goals  Patient goals for therapy: decreased edema, decreased pain and increased motion          Objective     General Comments: Ankle/Foot Comments   Physical assessment:  Palpation: Moderate ttp B lower leg  Skin Mobility: Poor B lower legs w/ moderate fibrosis, L>R  Skin color: Reduced redness t/o w/ + hemosiderin staining B lower legs from just below tibial tubercle to dorsum of B feet  Pitting: +1  Wound presence:  L LE, numerous blisters, closed  Wound size: n/a  Wound color: n/a  Stemmer's Sign:  + B LE  Gait assessment: wheelchair dependent  Transfer status: Max A x1  Ability to don/doff clothing/garments:  Mod A         Flowsheet Rows      Most Recent Value   girth measurments   Extremity   Lower extremity   LE Girth Measurments  Forefoot, Ankle Figure 8, Malleoli, M+10cm, M+20cm, M+30cm, M+40cm   Forefoot   L Forefoot Initial girth  24 75 cm   L Forefoot Updated Girth  24 5 cm   L Forefoot Girth Calculation  -0 25 cm   R Forefoot Initial girth  26 2   R Forefoot Updated Girth  23 75   L Forefoot girth calculation  -2 45   Ankle Figure 8   L Ankle Figure 8 Initial girth  56 cm   L Ankle Figure 8 Updated Girth  57 cm   L Ankle Figure 8 Girth Calculation  1 cm   R Ankle Figure 8 Initial girth  56 cm   R Ankle Figure 8 Updated Girth  55 5 cm   R Ankle Figure 8 Girth Calculation  -0 5 cm   Malleoli   L Malleoli Initial girth  26 cm   L Malleoli Updated girth  27 cm   L Malleoli Girth Calculation  1 cm   R Malleoli Initial girth  28 cm   R Malleoli Updated girth  23 75 cm   R Malleoli Girth Calculation  -4 25 cm   M+10cm    L M+10cm Initial girth  32 5 cm   L M+10cm Updated girth  26 5 cm   L M+10cm Girth Calculation  -6 cm   R M+10cm Initial girth  29 25 cm   R M+10cm Updated girth  25 cm   R M+10cm Girth Calculation  -4 25 cm   M+20cm    L M+20cm Initial girth  44 25 cm   L M+20cm Updated girth  37 2 cm   L M+20cm Girth Calculation  -7 05 cm   R M+20cm Initial girth  41 25 cm   R M+20cm Updated girth  34 75 cm   R M+20cm Girth Calculation -6 5 cm   M+30cm    L M+30cm Initial girth  41 cm   L M+30cm Updated girth  40 cm   L M+30cm Girth Calculation  -1 cm   R M+30cm Initial girth  39 5 cm   R M+30cm Updated girth  37 5 cm   R M+30cm Girth Calculation  -2 cm   M+40cm    L M+40cm Initial girth  48 cm   L M+40cm Updated girth  44 5 cm   L M+40cm Girth Calculation  -3 5 cm   R M+40cm Initial girth  47 cm   R M+40cm Updated girth  42 5 cm   R M+40cm Girth Calculation  -4 5 cm              Precautions: LOB/FALL RISK, WHEELCHAIR BOUND     Daily Treatment Diary     Manual  2-11            Modified B LE MLD  [facility]            B ZAFAR solaris ready wraps [facility]                                       x55'                Exercise Diary                                                                                                                                                                                                                                                                                      Modalities

## 2019-02-14 ENCOUNTER — APPOINTMENT (OUTPATIENT)
Dept: PHYSICAL THERAPY | Facility: CLINIC | Age: 84
End: 2019-02-14
Payer: MEDICARE

## 2019-02-19 ENCOUNTER — APPOINTMENT (OUTPATIENT)
Dept: PHYSICAL THERAPY | Facility: CLINIC | Age: 84
End: 2019-02-19
Payer: MEDICARE

## 2019-02-20 ENCOUNTER — TELEPHONE (OUTPATIENT)
Dept: FAMILY MEDICINE CLINIC | Facility: CLINIC | Age: 84
End: 2019-02-20

## 2019-02-20 NOTE — TELEPHONE ENCOUNTER
Call patient  I understand she had a fall recently  How is she feeling? If still with symptoms, we should see her in our office to check her out

## 2019-02-21 ENCOUNTER — APPOINTMENT (OUTPATIENT)
Dept: PHYSICAL THERAPY | Facility: CLINIC | Age: 84
End: 2019-02-21
Payer: MEDICARE

## 2019-02-25 ENCOUNTER — OFFICE VISIT (OUTPATIENT)
Dept: PHYSICAL THERAPY | Facility: CLINIC | Age: 84
End: 2019-02-25
Payer: MEDICARE

## 2019-02-25 DIAGNOSIS — R60.0 LOCALIZED EDEMA: Primary | ICD-10-CM

## 2019-02-25 DIAGNOSIS — I89.0 LYMPHEDEMA: ICD-10-CM

## 2019-02-25 PROCEDURE — 97140 MANUAL THERAPY 1/> REGIONS: CPT | Performed by: PHYSICAL THERAPIST

## 2019-02-25 NOTE — PROGRESS NOTES
Daily Note     Today's date: 2019  Patient name: Tena Bell  : 1935  MRN: [id number]  Referring provider: Yifan Flower DO  Dx:   Encounter Diagnosis     ICD-10-CM    1  Localized edema R60 0    2  Lymphedema I89 0                   Subjective: PN noted posterior aspect of head since fall now mild, also abrasion on R knee that is taking some time to heal but overall pt states she is doing better and admits B LE cont to improve w/ tx  Objective: See treatment diary below    Assessment: Tolerated treatment fair  Patient would benefit from continued PT  Good vol reduction cont'd and slow reduction in fibrosis + improved skin integrity noted  Plan: Continue per plan of care  Precautions: WC bound, requires Max A to transfer, cant carmelita supine, SCI  B LE lymphedema     Daily Treatment Diary     Manual  2-25            B LE solaris ready wrap donning + instruction [facility]            MLD B LE - modified tx [facility]                                       x50'                Exercise Diary                                                                                                                                                                                                                                                                                      Modalities

## 2019-02-28 ENCOUNTER — OFFICE VISIT (OUTPATIENT)
Dept: PHYSICAL THERAPY | Facility: CLINIC | Age: 84
End: 2019-02-28
Payer: MEDICARE

## 2019-02-28 DIAGNOSIS — I89.0 LYMPHEDEMA: ICD-10-CM

## 2019-02-28 DIAGNOSIS — R60.0 LOCALIZED EDEMA: Primary | ICD-10-CM

## 2019-02-28 PROCEDURE — 97140 MANUAL THERAPY 1/> REGIONS: CPT | Performed by: PHYSICAL THERAPIST

## 2019-02-28 NOTE — PROGRESS NOTES
Daily Note     Today's date: 2019  Patient name: Venu Buitrago  : 1935  MRN: [id number]  Referring provider: Stephanie Santos DO  Dx:   Encounter Diagnosis     ICD-10-CM    1  Localized edema R60 0    2  Lymphedema I89 0                   Subjective: PN B LE was more severe yesterday, less intense this day but still limiting activity carmelita  Objective: See treatment diary below    Assessment: Tolerated treatment fair  Patient would benefit from continued PT  Cont'd progressive reduction in vol and soft tissue restriction w/ skin integrity improving including reduced blistering and scaling of skin  Plan: Continue per plan of care  Precautions: WC bound, requires Max A to transfer, cant carmelita supine, SCI  B LE lymphedema     Daily Treatment Diary     Manual             B LE solaris ready wrap donning + instruction [facility]           MLD B LE - modified tx [facility]                                      x50' x50'               Exercise Diary                                                                                                                                                                                                                                                                                      Modalities

## 2019-03-04 ENCOUNTER — APPOINTMENT (OUTPATIENT)
Dept: PHYSICAL THERAPY | Facility: CLINIC | Age: 84
End: 2019-03-04
Payer: MEDICARE

## 2019-03-07 ENCOUNTER — OFFICE VISIT (OUTPATIENT)
Dept: PHYSICAL THERAPY | Facility: CLINIC | Age: 84
End: 2019-03-07
Payer: MEDICARE

## 2019-03-07 DIAGNOSIS — R60.0 LOCALIZED EDEMA: Primary | ICD-10-CM

## 2019-03-07 DIAGNOSIS — I89.0 LYMPHEDEMA: ICD-10-CM

## 2019-03-07 PROCEDURE — 97140 MANUAL THERAPY 1/> REGIONS: CPT | Performed by: PHYSICAL THERAPIST

## 2019-03-07 NOTE — PROGRESS NOTES
Daily Note     Today's date: 3/7/2019  Patient name: Latanya Lozano  : 1935  MRN: [id number]  Referring provider: Mary Soriano DO  Dx:   Encounter Diagnosis     ICD-10-CM    1  Localized edema R60 0    2  Lymphedema I89 0                   Subjective: PN R LE has been more severe since fall, referring from hip thru lower leg  PN rated 7-8/10  Advised pt again she should f/u w/ family MD   Pt reports less tolerance to maintaining wraps since  Objective: See treatment diary below    Assessment: Tolerated treatment fair  Patient would benefit from continued PT  Poor carmelita to compression R LE this day, admits she even feels slight discomfort w/ MLD R le due to sensitivity being higher  Minimal change in skin fibrosis or vol since last session  Plan: Continue per plan of care  Precautions: WC bound, requires Max A to transfer, cant carmelita supine, SCI  B LE lymphedema     Daily Treatment Diary     Manual  2-25 2-28 3-7          B LE solaris ready wrap donning + instruction [facility]          MLD B LE - modified tx [facility]                                     x50' x50' x50'              Exercise Diary                                                                                                                                                                                                                                                                                      Modalities

## 2019-03-11 ENCOUNTER — OFFICE VISIT (OUTPATIENT)
Dept: PHYSICAL THERAPY | Facility: CLINIC | Age: 84
End: 2019-03-11
Payer: MEDICARE

## 2019-03-11 DIAGNOSIS — I89.0 LYMPHEDEMA: ICD-10-CM

## 2019-03-11 DIAGNOSIS — R60.0 LOCALIZED EDEMA: Primary | ICD-10-CM

## 2019-03-11 PROCEDURE — 97140 MANUAL THERAPY 1/> REGIONS: CPT | Performed by: PHYSICAL THERAPIST

## 2019-03-11 NOTE — PROGRESS NOTES
Daily Note     Today's date: 3/11/2019  Patient name: Tomasz Greco  : 1935  MRN: [id number]  Referring provider: Fam Griffin DO  Dx:   Encounter Diagnosis     ICD-10-CM    1  Localized edema R60 0    2  Lymphedema I89 0                   Subjective: PN persists B LE, unable to wear wraps Saturday but could maintain   Noticed a small blister that formed top of R foot  Objective: See treatment diary below    Assessment: Tolerated treatment fair  Patient would benefit from continued PT  Small 0 25cm x 0 2cm blister R dorsal aspect of foot  Advised pt and her  to maintain antibacterial lotion in this area  Slight increase in edema of R foot and toes noted this day  Plan: Continue per plan of care  Precautions: WC bound, requires Max A to transfer, cant carmelita supine, SCI  B LE lymphedema     Daily Treatment Diary     Manual  2- 2-28 3-7 3-11         B LE solaris ready wrap donning + instruction [facility]         MLD B LE - modified tx [facility]                                    x50' x50' x50' x50'             Exercise Diary                                                                                                                                                                                                                                                                                      Modalities

## 2019-03-14 ENCOUNTER — OFFICE VISIT (OUTPATIENT)
Dept: PHYSICAL THERAPY | Facility: CLINIC | Age: 84
End: 2019-03-14
Payer: MEDICARE

## 2019-03-14 DIAGNOSIS — I89.0 LYMPHEDEMA: ICD-10-CM

## 2019-03-14 DIAGNOSIS — R60.0 LOCALIZED EDEMA: Primary | ICD-10-CM

## 2019-03-14 PROCEDURE — 97140 MANUAL THERAPY 1/> REGIONS: CPT | Performed by: PHYSICAL THERAPIST

## 2019-03-14 NOTE — PROGRESS NOTES
Daily Note     Today's date: 3/14/2019  Patient name: Kamila Marcum  : 1935  MRN: [id number]  Referring provider: Erika Bauer DO  Dx:   Encounter Diagnosis     ICD-10-CM    1  Localized edema R60 0    2  Lymphedema I89 0                   Subjective: Pt reports stubbing her toe on the curb yesterday causing slight abrasion  Objective: See treatment diary below    Assessment: Tolerated treatment fair  Patient would benefit from continued PT  Abrasion covered by bandage, no sign of infection or immediate concern, advised pt to monitor  B LE vol down, pigmentation improved, and reduced fibrosis advancing  Plan: Continue per plan of care  Precautions: WC bound, requires Max A to transfer, cant carmelita supine, SCI  B LE lymphedema     Daily Treatment Diary     Manual  2- 2-28 3-7 3-11 3-14        B LE solaris ready wrap donning + instruction [facility]        MLD B LE - modified tx [facility]                                   x50' x50' x50' x50' x50'            Exercise Diary                                                                                                                                                                                                                                                                                      Modalities

## 2019-03-18 ENCOUNTER — APPOINTMENT (OUTPATIENT)
Dept: PHYSICAL THERAPY | Facility: CLINIC | Age: 84
End: 2019-03-18
Payer: MEDICARE

## 2019-03-21 ENCOUNTER — OFFICE VISIT (OUTPATIENT)
Dept: PHYSICAL THERAPY | Facility: CLINIC | Age: 84
End: 2019-03-21
Payer: MEDICARE

## 2019-03-21 DIAGNOSIS — I89.0 LYMPHEDEMA: ICD-10-CM

## 2019-03-21 DIAGNOSIS — R60.0 LOCALIZED EDEMA: Primary | ICD-10-CM

## 2019-03-21 PROCEDURE — 97140 MANUAL THERAPY 1/> REGIONS: CPT | Performed by: PHYSICAL THERAPIST

## 2019-03-21 NOTE — PROGRESS NOTES
Daily Note     Today's date: 3/21/2019  Patient name: Arlene Drew  : 1935  MRN: [id number]  Referring provider: Rocio Barrett DO  Dx:   Encounter Diagnosis     ICD-10-CM    1  Localized edema R60 0    2  Lymphedema I89 0                   Subjective: Pt admits legs feel skinnier, also noting less PN but L LE has a blister that developed  Objective: See treatment diary below    Assessment: Tolerated treatment fair  Patient would benefit from continued PT  Pt has good carmelita to tx this day, advised pt to maintain lotion, antibacterial ointment to region around blister  Plan: Continue per plan of care  Precautions: WC bound, requires Max A to transfer, cant carmelita supine, SCI  B LE lymphedema     Daily Treatment Diary     Manual  2- 2-28 3-7 3-11 3-14 3-21       B LE solaris ready wrap donning + instruction [facility]       MLD B LE - modified tx [facility]                                  x50' x50' x50' x50' x50' x50'           Exercise Diary                                                                                                                                                                                                                                                                                      Modalities

## 2019-03-25 ENCOUNTER — OFFICE VISIT (OUTPATIENT)
Dept: PHYSICAL THERAPY | Facility: CLINIC | Age: 84
End: 2019-03-25
Payer: MEDICARE

## 2019-03-25 DIAGNOSIS — R60.0 LOCALIZED EDEMA: Primary | ICD-10-CM

## 2019-03-25 DIAGNOSIS — R11.0 NAUSEA: Primary | ICD-10-CM

## 2019-03-25 DIAGNOSIS — I89.0 LYMPHEDEMA: ICD-10-CM

## 2019-03-25 PROCEDURE — 97140 MANUAL THERAPY 1/> REGIONS: CPT | Performed by: PHYSICAL THERAPIST

## 2019-03-25 RX ORDER — ONDANSETRON HYDROCHLORIDE 8 MG/1
8 TABLET, FILM COATED ORAL EVERY 8 HOURS PRN
Qty: 20 TABLET | Refills: 1 | Status: SHIPPED | OUTPATIENT
Start: 2019-03-25 | End: 2020-08-10 | Stop reason: SDUPTHER

## 2019-03-25 NOTE — PROGRESS NOTES
Daily Note     Today's date: 3/25/2019  Patient name: Ildefonso Kumar  : 1935  MRN: [id number]  Referring provider: Tu Chapman DO  Dx:   Encounter Diagnosis     ICD-10-CM    1  Localized edema R60 0    2  Lymphedema I89 0                   Subjective: Pt reports PN severe OTW, not quite as bad this day citing R>L LE, associated w/ her "Nerve PN"     Objective: See treatment diary below    Assessment: Tolerated treatment fair  Patient would benefit from continued PT  Pt has good carmelita to tx this day, advised pt to maintain lotion, antibacterial ointment to region around blister  Plan: Continue per plan of care  Precautions: WC bound, requires Max A to transfer, cant carmelita supine, SCI  B LE lymphedema     Daily Treatment Diary     Manual   2 3-7 3-11 3-14 3-21 3-25      B LE solaris ready wrap donning + instruction [facility]      MLD B LE - modified tx [facility]                                 x50' x50' x50' x50' x50' x50' x50'          Exercise Diary                                                                                                                                                                                                                                                                                      Modalities

## 2019-03-28 ENCOUNTER — OFFICE VISIT (OUTPATIENT)
Dept: PHYSICAL THERAPY | Facility: CLINIC | Age: 84
End: 2019-03-28
Payer: MEDICARE

## 2019-03-28 DIAGNOSIS — I89.0 LYMPHEDEMA: ICD-10-CM

## 2019-03-28 DIAGNOSIS — R60.0 LOCALIZED EDEMA: Primary | ICD-10-CM

## 2019-03-28 PROCEDURE — 97140 MANUAL THERAPY 1/> REGIONS: CPT | Performed by: PHYSICAL THERAPIST

## 2019-03-28 NOTE — PROGRESS NOTES
Daily Note     Today's date: 3/28/2019  Patient name: Christiano Fernandes  : 1935  MRN: [id number]  Referring provider: Bernabe Gregory DO  Dx:   Encounter Diagnosis     ICD-10-CM    1  Localized edema R60 0    2  Lymphedema I89 0                   Subjective: Pt reports leg PN less sig this day also noticing a reduction in the blister size L LE  Objective: See treatment diary below    Assessment: Tolerated treatment fair  Patient would benefit from continued PT  Overall vol reducing, good response to tx, less ttp noted this day, cont to advance as carmelita  Plan: Continue per plan of care  Precautions: WC bound, requires Max A to transfer, cant carmelita supine, SCI  B LE lymphedema     Daily Treatment Diary     Manual   2- 3-7 3-11 3-14 3-21 3- 3-28     B LE solaris ready wrap donning + instruction [facility] jp     MLD B LE - modified [facility]                                x50' x50' x50' x50' x50' x50' x50' x50'         Exercise Diary                                                                                                                                                                                                                                                                                      Modalities

## 2019-04-01 ENCOUNTER — APPOINTMENT (OUTPATIENT)
Dept: PHYSICAL THERAPY | Facility: CLINIC | Age: 84
End: 2019-04-01
Payer: MEDICARE

## 2019-04-04 ENCOUNTER — OFFICE VISIT (OUTPATIENT)
Dept: PHYSICAL THERAPY | Facility: CLINIC | Age: 84
End: 2019-04-04
Payer: MEDICARE

## 2019-04-04 DIAGNOSIS — R60.0 LOCALIZED EDEMA: Primary | ICD-10-CM

## 2019-04-04 DIAGNOSIS — I89.0 LYMPHEDEMA: ICD-10-CM

## 2019-04-04 PROCEDURE — 97140 MANUAL THERAPY 1/> REGIONS: CPT | Performed by: PHYSICAL THERAPIST

## 2019-04-08 ENCOUNTER — APPOINTMENT (OUTPATIENT)
Dept: PHYSICAL THERAPY | Facility: CLINIC | Age: 84
End: 2019-04-08
Payer: MEDICARE

## 2019-04-11 ENCOUNTER — OFFICE VISIT (OUTPATIENT)
Dept: PHYSICAL THERAPY | Facility: CLINIC | Age: 84
End: 2019-04-11
Payer: MEDICARE

## 2019-04-11 DIAGNOSIS — R60.0 LOCALIZED EDEMA: Primary | ICD-10-CM

## 2019-04-11 DIAGNOSIS — I89.0 LYMPHEDEMA: ICD-10-CM

## 2019-04-11 PROCEDURE — 97140 MANUAL THERAPY 1/> REGIONS: CPT | Performed by: PHYSICAL THERAPIST

## 2019-04-13 DIAGNOSIS — R60.9 EDEMA, UNSPECIFIED TYPE: ICD-10-CM

## 2019-04-14 RX ORDER — FUROSEMIDE 40 MG/1
40 TABLET ORAL 2 TIMES DAILY
Qty: 180 TABLET | Refills: 0 | Status: SHIPPED | OUTPATIENT
Start: 2019-04-14 | End: 2019-09-10 | Stop reason: SDUPTHER

## 2019-04-22 ENCOUNTER — HOSPITAL ENCOUNTER (OUTPATIENT)
Dept: INFUSION CENTER | Facility: CLINIC | Age: 84
Discharge: HOME | End: 2019-04-22
Payer: MEDICARE

## 2019-04-22 PROCEDURE — 96523 IRRIG DRUG DELIVERY DEVICE: CPT

## 2019-04-22 NOTE — PLAN OF CARE
Problem: PAIN - ADULT  Goal: Verbalizes/displays adequate comfort level or baseline comfort level  Description  Interventions:  - Encourage patient to monitor pain and request assistance  - Assess pain using appropriate pain scale  - Administer analgesics based on type and severity of pain and evaluate response  - Implement non-pharmacological measures as appropriate and evaluate response  - Consider cultural and social influences on pain and pain management  - Notify physician/advanced practitioner if interventions unsuccessful or patient reports new pain  Outcome: Progressing     Problem: SAFETY ADULT  Goal: Patient will remain free of falls  Description  INTERVENTIONS:  - Assess patient frequently for physical needs  -  Identify cognitive and physical deficits and behaviors that affect risk of falls    -  [place] fall precautions as indicated by assessment   - Educate patient/family on patient safety including physical limitations  - Instruct patient to call for assistance with activity based on assessment  - Modify environment to reduce risk of injury  - Consider OT/PT consult to assist with strengthening/mobility  Outcome: Progressing  Goal: Maintain or return to baseline ADL function  Description  INTERVENTIONS:  -  Assess patient's ability to carry out ADLs; assess patient's baseline for ADL function and identify physical deficits which impact ability to perform ADLs (bathing, care of mouth/teeth, toileting, grooming, dressing, etc )  - Assess/evaluate cause of self-care deficits   - Assess range of motion  - Assess patient's mobility; develop plan if impaired  - Assess patient's need for assistive devices and provide as appropriate  - Encourage maximum independence but intervene and supervise when necessary  ¯ Involve family in performance of ADLs  ¯ Assess for home care needs following discharge   ¯ Request OT consult to assist with ADL evaluation and planning for discharge  ¯ Provide patient education as appropriate  Outcome: Progressing  Goal: Maintain or return mobility status to optimal level  Description  INTERVENTIONS:  - Assess patient's baseline mobility status (ambulation, transfers, stairs, etc )    - Identify cognitive and physical deficits and behaviors that affect mobility  - Identify mobility aids required to assist with transfers and/or ambulation (gait belt, sit-to-stand, lift, walker, cane, etc )  - [place] fall precautions as indicated by assessment  - Record patient progress and toleration of activity level on Mobility SBAR; progress patient to next Phase/Stage  - Instruct patient to call for assistance with activity based on assessment  - Request Rehabilitation consult to assist with strengthening/weightbearing, etc   Outcome: Progressing     Problem: Knowledge Deficit  Goal: Patient/family/caregiver demonstrates understanding of disease process, treatment plan, medications, and discharge instructions  Description  Complete learning assessment and assess knowledge base    Interventions:  - Provide teaching at level of understanding  - Provide teaching via preferred learning methods  Outcome: Progressing

## 2019-04-22 NOTE — PROGRESS NOTES
Pt  Denies new symptoms or concerns at this time  Port flushed without adverse event  Future appointment completed  AVS provided

## 2019-04-25 ENCOUNTER — APPOINTMENT (OUTPATIENT)
Dept: PHYSICAL THERAPY | Facility: CLINIC | Age: 84
End: 2019-04-25
Payer: MEDICARE

## 2019-04-25 ENCOUNTER — TELEPHONE (OUTPATIENT)
Dept: FAMILY MEDICINE CLINIC | Facility: CLINIC | Age: 84
End: 2019-04-25

## 2019-04-26 ENCOUNTER — TELEPHONE (OUTPATIENT)
Dept: FAMILY MEDICINE CLINIC | Facility: CLINIC | Age: 84
End: 2019-04-26

## 2019-04-30 ENCOUNTER — OFFICE VISIT (OUTPATIENT)
Dept: PHYSICAL THERAPY | Facility: CLINIC | Age: 84
End: 2019-04-30
Payer: MEDICARE

## 2019-04-30 DIAGNOSIS — I89.0 LYMPHEDEMA: ICD-10-CM

## 2019-04-30 DIAGNOSIS — R60.0 LOCALIZED EDEMA: Primary | ICD-10-CM

## 2019-04-30 PROCEDURE — 97140 MANUAL THERAPY 1/> REGIONS: CPT | Performed by: PHYSICAL THERAPIST

## 2019-05-02 ENCOUNTER — OFFICE VISIT (OUTPATIENT)
Dept: PHYSICAL THERAPY | Facility: CLINIC | Age: 84
End: 2019-05-02
Payer: MEDICARE

## 2019-05-02 DIAGNOSIS — I89.0 LYMPHEDEMA: ICD-10-CM

## 2019-05-02 DIAGNOSIS — R60.0 LOCALIZED EDEMA: Primary | ICD-10-CM

## 2019-05-02 PROCEDURE — 97140 MANUAL THERAPY 1/> REGIONS: CPT | Performed by: PHYSICAL THERAPIST

## 2019-05-06 ENCOUNTER — OFFICE VISIT (OUTPATIENT)
Dept: PHYSICAL THERAPY | Facility: CLINIC | Age: 84
End: 2019-05-06
Payer: MEDICARE

## 2019-05-06 ENCOUNTER — TELEPHONE (OUTPATIENT)
Dept: FAMILY MEDICINE CLINIC | Facility: CLINIC | Age: 84
End: 2019-05-06

## 2019-05-06 DIAGNOSIS — R60.0 LOCALIZED EDEMA: Primary | ICD-10-CM

## 2019-05-06 DIAGNOSIS — I89.0 LYMPHEDEMA: ICD-10-CM

## 2019-05-06 PROCEDURE — 97140 MANUAL THERAPY 1/> REGIONS: CPT | Performed by: PHYSICAL THERAPIST

## 2019-05-09 ENCOUNTER — APPOINTMENT (OUTPATIENT)
Dept: PHYSICAL THERAPY | Facility: CLINIC | Age: 84
End: 2019-05-09
Payer: MEDICARE

## 2019-05-13 ENCOUNTER — OFFICE VISIT (OUTPATIENT)
Dept: PHYSICAL THERAPY | Facility: CLINIC | Age: 84
End: 2019-05-13
Payer: MEDICARE

## 2019-05-13 DIAGNOSIS — R60.0 LOCALIZED EDEMA: Primary | ICD-10-CM

## 2019-05-13 DIAGNOSIS — I89.0 LYMPHEDEMA: ICD-10-CM

## 2019-05-13 PROCEDURE — 97140 MANUAL THERAPY 1/> REGIONS: CPT | Performed by: PHYSICAL THERAPIST

## 2019-05-16 ENCOUNTER — APPOINTMENT (OUTPATIENT)
Dept: PHYSICAL THERAPY | Facility: CLINIC | Age: 84
End: 2019-05-16
Payer: MEDICARE

## 2019-05-20 ENCOUNTER — OFFICE VISIT (OUTPATIENT)
Dept: PHYSICAL THERAPY | Facility: CLINIC | Age: 84
End: 2019-05-20
Payer: MEDICARE

## 2019-05-20 DIAGNOSIS — I89.0 LYMPHEDEMA: ICD-10-CM

## 2019-05-20 DIAGNOSIS — R60.0 LOCALIZED EDEMA: Primary | ICD-10-CM

## 2019-05-20 PROCEDURE — 97140 MANUAL THERAPY 1/> REGIONS: CPT | Performed by: PHYSICAL THERAPIST

## 2019-05-21 ENCOUNTER — DOCUMENTATION (OUTPATIENT)
Dept: FAMILY MEDICINE CLINIC | Facility: CLINIC | Age: 84
End: 2019-05-21

## 2019-05-23 ENCOUNTER — OFFICE VISIT (OUTPATIENT)
Dept: PHYSICAL THERAPY | Facility: CLINIC | Age: 84
End: 2019-05-23
Payer: MEDICARE

## 2019-05-23 DIAGNOSIS — I89.0 LYMPHEDEMA: ICD-10-CM

## 2019-05-23 DIAGNOSIS — R60.0 LOCALIZED EDEMA: Primary | ICD-10-CM

## 2019-05-23 PROCEDURE — 97140 MANUAL THERAPY 1/> REGIONS: CPT | Performed by: PHYSICAL THERAPIST

## 2019-05-28 DIAGNOSIS — M19.90 ARTHRITIS: ICD-10-CM

## 2019-05-28 RX ORDER — CELECOXIB 200 MG/1
200 CAPSULE ORAL 2 TIMES DAILY
Qty: 60 CAPSULE | Refills: 2 | Status: SHIPPED | OUTPATIENT
Start: 2019-05-28 | End: 2019-12-29 | Stop reason: SDUPTHER

## 2019-05-30 ENCOUNTER — OFFICE VISIT (OUTPATIENT)
Dept: PHYSICAL THERAPY | Facility: CLINIC | Age: 84
End: 2019-05-30
Payer: MEDICARE

## 2019-05-30 ENCOUNTER — TELEPHONE (OUTPATIENT)
Dept: FAMILY MEDICINE CLINIC | Facility: CLINIC | Age: 84
End: 2019-05-30

## 2019-05-30 DIAGNOSIS — I89.0 LYMPHEDEMA: ICD-10-CM

## 2019-05-30 DIAGNOSIS — R60.0 LOCALIZED EDEMA: Primary | ICD-10-CM

## 2019-05-30 DIAGNOSIS — G89.4 PAIN SYNDROME, CHRONIC: Primary | ICD-10-CM

## 2019-05-30 PROCEDURE — 97140 MANUAL THERAPY 1/> REGIONS: CPT | Performed by: PHYSICAL THERAPIST

## 2019-05-30 RX ORDER — LIDOCAINE 50 MG/G
1 PATCH TOPICAL DAILY PRN
Qty: 30 PATCH | Refills: 0 | Status: SHIPPED | OUTPATIENT
Start: 2019-05-30

## 2019-06-03 ENCOUNTER — OFFICE VISIT (OUTPATIENT)
Dept: PHYSICAL THERAPY | Facility: CLINIC | Age: 84
End: 2019-06-03
Payer: MEDICARE

## 2019-06-03 DIAGNOSIS — I89.0 LYMPHEDEMA: ICD-10-CM

## 2019-06-03 DIAGNOSIS — R60.0 LOCALIZED EDEMA: Primary | ICD-10-CM

## 2019-06-03 PROCEDURE — 97140 MANUAL THERAPY 1/> REGIONS: CPT | Performed by: PHYSICAL THERAPIST

## 2019-06-06 ENCOUNTER — APPOINTMENT (OUTPATIENT)
Dept: PHYSICAL THERAPY | Facility: CLINIC | Age: 84
End: 2019-06-06
Payer: MEDICARE

## 2019-06-10 ENCOUNTER — OFFICE VISIT (OUTPATIENT)
Dept: PHYSICAL THERAPY | Facility: CLINIC | Age: 84
End: 2019-06-10
Payer: MEDICARE

## 2019-06-10 DIAGNOSIS — I89.0 LYMPHEDEMA: ICD-10-CM

## 2019-06-10 DIAGNOSIS — R60.0 LOCALIZED EDEMA: Primary | ICD-10-CM

## 2019-06-10 PROCEDURE — 97164 PT RE-EVAL EST PLAN CARE: CPT | Performed by: PHYSICAL THERAPIST

## 2019-06-10 PROCEDURE — 97140 MANUAL THERAPY 1/> REGIONS: CPT | Performed by: PHYSICAL THERAPIST

## 2019-06-13 ENCOUNTER — TELEPHONE (OUTPATIENT)
Dept: FAMILY MEDICINE CLINIC | Facility: CLINIC | Age: 84
End: 2019-06-13

## 2019-06-13 ENCOUNTER — OFFICE VISIT (OUTPATIENT)
Dept: PHYSICAL THERAPY | Facility: CLINIC | Age: 84
End: 2019-06-13
Payer: MEDICARE

## 2019-06-13 DIAGNOSIS — I89.0 LYMPHEDEMA: ICD-10-CM

## 2019-06-13 DIAGNOSIS — R60.0 LOCALIZED EDEMA: Primary | ICD-10-CM

## 2019-06-13 PROCEDURE — 97140 MANUAL THERAPY 1/> REGIONS: CPT | Performed by: PHYSICAL THERAPIST

## 2019-06-17 ENCOUNTER — APPOINTMENT (OUTPATIENT)
Dept: PHYSICAL THERAPY | Facility: CLINIC | Age: 84
End: 2019-06-17
Payer: MEDICARE

## 2019-06-18 ENCOUNTER — OFFICE VISIT (OUTPATIENT)
Dept: FAMILY MEDICINE CLINIC | Facility: CLINIC | Age: 84
End: 2019-06-18
Payer: MEDICARE

## 2019-06-18 VITALS
WEIGHT: 142 LBS | BODY MASS INDEX: 24.37 KG/M2 | TEMPERATURE: 99 F | SYSTOLIC BLOOD PRESSURE: 110 MMHG | OXYGEN SATURATION: 92 % | HEART RATE: 61 BPM | DIASTOLIC BLOOD PRESSURE: 60 MMHG

## 2019-06-18 DIAGNOSIS — K50.90 CROHN'S DISEASE WITHOUT COMPLICATION, UNSPECIFIED GASTROINTESTINAL TRACT LOCATION (HCC): ICD-10-CM

## 2019-06-18 DIAGNOSIS — I89.0 NONINFECTIOUS LYMPHEDEMA: ICD-10-CM

## 2019-06-18 DIAGNOSIS — G89.4 PAIN SYNDROME, CHRONIC: Primary | ICD-10-CM

## 2019-06-18 DIAGNOSIS — R53.81 PHYSICAL DEBILITY: ICD-10-CM

## 2019-06-18 DIAGNOSIS — Z87.828 H/O SPINAL CORD INJURY: ICD-10-CM

## 2019-06-18 DIAGNOSIS — M46.1 SACROILIITIS (HCC): ICD-10-CM

## 2019-06-18 DIAGNOSIS — M54.5 CHRONIC BILATERAL LOW BACK PAIN, WITH SCIATICA PRESENCE UNSPECIFIED: ICD-10-CM

## 2019-06-18 DIAGNOSIS — M48.00 SPINAL STENOSIS, UNSPECIFIED SPINAL REGION: ICD-10-CM

## 2019-06-18 DIAGNOSIS — G89.29 CHRONIC BILATERAL LOW BACK PAIN, WITH SCIATICA PRESENCE UNSPECIFIED: ICD-10-CM

## 2019-06-18 DIAGNOSIS — R26.2 AMBULATORY DYSFUNCTION: ICD-10-CM

## 2019-06-18 PROBLEM — Z79.899 HIGH RISK MEDICATIONS (NOT ANTICOAGULANTS) LONG-TERM USE: Status: ACTIVE | Noted: 2017-09-06

## 2019-06-18 PROBLEM — M79.10 MYALGIA: Status: ACTIVE | Noted: 2017-10-30

## 2019-06-18 PROBLEM — R10.9 ABDOMINAL PAIN OF UNKNOWN ETIOLOGY: Status: ACTIVE | Noted: 2017-06-30

## 2019-06-18 PROBLEM — R73.9 ELEVATED BLOOD SUGAR: Status: ACTIVE | Noted: 2017-09-04

## 2019-06-18 PROBLEM — Z02.89 PAIN MEDICATION AGREEMENT: Status: ACTIVE | Noted: 2017-11-28

## 2019-06-18 PROCEDURE — 99214 OFFICE O/P EST MOD 30 MIN: CPT | Performed by: FAMILY MEDICINE

## 2019-06-20 ENCOUNTER — OFFICE VISIT (OUTPATIENT)
Dept: PHYSICAL THERAPY | Facility: CLINIC | Age: 84
End: 2019-06-20
Payer: MEDICARE

## 2019-06-20 DIAGNOSIS — R60.0 LOCALIZED EDEMA: Primary | ICD-10-CM

## 2019-06-20 DIAGNOSIS — I89.0 LYMPHEDEMA: ICD-10-CM

## 2019-06-20 PROCEDURE — 97140 MANUAL THERAPY 1/> REGIONS: CPT | Performed by: PHYSICAL THERAPIST

## 2019-06-24 ENCOUNTER — OFFICE VISIT (OUTPATIENT)
Dept: PHYSICAL THERAPY | Facility: CLINIC | Age: 84
End: 2019-06-24
Payer: MEDICARE

## 2019-06-24 DIAGNOSIS — I89.0 LYMPHEDEMA: ICD-10-CM

## 2019-06-24 DIAGNOSIS — R60.0 LOCALIZED EDEMA: Primary | ICD-10-CM

## 2019-06-24 PROCEDURE — 97140 MANUAL THERAPY 1/> REGIONS: CPT | Performed by: PHYSICAL THERAPIST

## 2019-07-02 ENCOUNTER — OFFICE VISIT (OUTPATIENT)
Dept: PHYSICAL THERAPY | Facility: CLINIC | Age: 84
End: 2019-07-02
Payer: MEDICARE

## 2019-07-02 DIAGNOSIS — I89.0 LYMPHEDEMA: ICD-10-CM

## 2019-07-02 DIAGNOSIS — R60.0 LOCALIZED EDEMA: Primary | ICD-10-CM

## 2019-07-02 PROCEDURE — 97140 MANUAL THERAPY 1/> REGIONS: CPT | Performed by: PHYSICAL THERAPIST

## 2019-07-02 NOTE — PROGRESS NOTES
Daily Note     Today's date: 2019  Patient name: Precious Mendoza  : 1935  MRN: [id number]  Referring provider: Ewa Yuan DO  Dx:   Encounter Diagnosis     ICD-10-CM    1  Localized edema R60 0    2  Lymphedema I89 0                   Subjective: Pt reports both legs are feeling a bit better, less PN overall this day  Objective: See treatment diary below    Assessment: Tolerated treatment fair  Patient would benefit from continued PT Softening of skin noted, reduced fibrosis B LE  Better tolerance to palpation w/ minimal PN c/o this day  Plan: Continue per plan of care  Precautions: WC bound, requires Max A to transfer, cant carmelita supine, SCI  B LE lymphedema     Daily Treatment Diary     Manual  6-20 6-24 7-2       6-13   B LE solaris ready wrap donning + instruction [facility]   MLD B LE - modified tx [facility]                              x45' x50' x50'       x45'       Exercise Diary                                                                                                                                                                                                                                                                                      Modalities

## 2019-07-03 ENCOUNTER — HOSPITAL ENCOUNTER (OUTPATIENT)
Dept: INFUSION CENTER | Facility: CLINIC | Age: 84
Discharge: HOME | End: 2019-07-03
Payer: MEDICARE

## 2019-07-03 PROCEDURE — 96523 IRRIG DRUG DELIVERY DEVICE: CPT

## 2019-07-03 NOTE — PLAN OF CARE
Problem: Potential for Falls  Goal: Patient will remain free of falls  Description  INTERVENTIONS:  - Assess patient frequently for physical needs  -  Identify cognitive and physical deficits and behaviors that affect risk of falls    -  [place] fall precautions as indicated by assessment   - Educate patient/family on patient safety including physical limitations  - Instruct patient to call for assistance with activity based on assessment  - Modify environment to reduce risk of injury  - Consider OT/PT consult to assist with strengthening/mobility  Outcome: Progressing

## 2019-07-09 ENCOUNTER — OFFICE VISIT (OUTPATIENT)
Dept: PHYSICAL THERAPY | Facility: CLINIC | Age: 84
End: 2019-07-09
Payer: MEDICARE

## 2019-07-09 DIAGNOSIS — R60.0 LOCALIZED EDEMA: Primary | ICD-10-CM

## 2019-07-09 DIAGNOSIS — I89.0 LYMPHEDEMA: ICD-10-CM

## 2019-07-09 PROCEDURE — 97140 MANUAL THERAPY 1/> REGIONS: CPT | Performed by: PHYSICAL THERAPIST

## 2019-07-10 NOTE — PROGRESS NOTES
Daily Note     Today's date: 2019  Patient name: Josue Villafana  : 1935  MRN: [id number]  Referring provider: Sarah Lucia DO  Dx:   Encounter Diagnosis     ICD-10-CM    1  Localized edema R60 0    2  Lymphedema I89 0                   Subjective: Pt presents this day admitting marked progress since IE but feels legs are a bit more swollen this day citing the hot weather  Objective: See treatment diary below    Assessment: Tolerated treatment fair  Patient would benefit from continued PT Increased edema most notable in B dorsal aspects of feet this day  Discussed potential d/c planning w/ pt and  this day  Plan: Continue per plan of care  Precautions: WC bound, requires Max A to transfer, cant carmelita supine, SCI  B LE lymphedema     Daily Treatment Diary     Manual  6 6-24 7-2 7-9      6-13   B LE solaris ready wrap donning + instruction [facility]   MLD B LE - modified tx [facility]                              x45' x50' x50' x45'      x45'       Exercise Diary                                                                                                                                                                                                                                                                                      Modalities

## 2019-07-15 ENCOUNTER — APPOINTMENT (OUTPATIENT)
Dept: PHYSICAL THERAPY | Facility: CLINIC | Age: 84
End: 2019-07-15
Payer: MEDICARE

## 2019-07-18 ENCOUNTER — APPOINTMENT (OUTPATIENT)
Dept: PHYSICAL THERAPY | Facility: CLINIC | Age: 84
End: 2019-07-18
Payer: MEDICARE

## 2019-07-22 ENCOUNTER — OFFICE VISIT (OUTPATIENT)
Dept: PHYSICAL THERAPY | Facility: CLINIC | Age: 84
End: 2019-07-22
Payer: MEDICARE

## 2019-07-22 DIAGNOSIS — I89.0 LYMPHEDEMA: ICD-10-CM

## 2019-07-22 DIAGNOSIS — R60.0 LOCALIZED EDEMA: Primary | ICD-10-CM

## 2019-07-22 PROCEDURE — 97140 MANUAL THERAPY 1/> REGIONS: CPT | Performed by: PHYSICAL THERAPIST

## 2019-07-22 NOTE — PROGRESS NOTES
Daily Note     Today's date: 2019  Patient name: Amauri Selby  : 1935  MRN: [id number]  Referring provider: Valentine Villafana DO  Dx:   Encounter Diagnosis     ICD-10-CM    1  Localized edema R60 0    2  Lymphedema I89 0                   Subjective: Pt c/o slight increase in edema citing missing appointments last wk and 1/2  Objective: See treatment diary below    Assessment: Tolerated treatment fair  Patient would benefit from continued PT Legs slightly more tight this day w more edema present, but not as tender today  Plan: Continue per plan of care  Precautions: WC bound, requires Max A to transfer, cant carmelita supine, SCI  B LE lymphedema     Daily Treatment Diary     Manual   6- 7-2 7-9 7-22     6-13   B LE solaris ready wrap donning + instruction [facility]   MLD B LE - modified tx jph jph jph jph jp     jp                              x45' x50' x50' x45' x50'     x45'       Exercise Diary                                                                                                                                                                                                                                                                                      Modalities

## 2019-07-25 ENCOUNTER — OFFICE VISIT (OUTPATIENT)
Dept: PHYSICAL THERAPY | Facility: CLINIC | Age: 84
End: 2019-07-25
Payer: MEDICARE

## 2019-07-25 DIAGNOSIS — I89.0 LYMPHEDEMA: ICD-10-CM

## 2019-07-25 DIAGNOSIS — R60.0 LOCALIZED EDEMA: Primary | ICD-10-CM

## 2019-07-25 PROCEDURE — 97140 MANUAL THERAPY 1/> REGIONS: CPT | Performed by: PHYSICAL THERAPIST

## 2019-07-25 NOTE — PROGRESS NOTES
Daily Note     Today's date: 2019  Patient name: Blake Steele  : 1935  MRN: [id number]  Referring provider: Ursula Hidalgo DO  Dx:   Encounter Diagnosis     ICD-10-CM    1  Localized edema R60 0    2  Lymphedema I89 0                   Subjective: Pt reports she feels her legs are doing better overall, edema fluctuates in her feet w/ maintaining dependent position during day  Objective: See treatment diary below    Assessment: Tolerated treatment fair  Patient would benefit from continued PT Reduced volume vs last visit w/ mild improvement in skin mobility, reduced fibrosis, and progressively improved skin health w/ now minimal blistering  Plan: Continue per plan of care  Precautions: WC bound, requires Max A to transfer, cant carmelita supine, SCI  B LE lymphedema     Daily Treatment Diary     Manual   7-2 7-9 7-22 7    6-13   B LE solaris ready wrap donning + instruction [facility]   MLD B LE - modified tx [facility]                              x45' x50' x50' x45' x50' x50'    x45'       Exercise Diary                                                                                                                                                                                                                                                                                      Modalities

## 2019-07-29 ENCOUNTER — APPOINTMENT (OUTPATIENT)
Dept: PHYSICAL THERAPY | Facility: CLINIC | Age: 84
End: 2019-07-29
Payer: MEDICARE

## 2019-07-31 ENCOUNTER — TELEPHONE (OUTPATIENT)
Dept: FAMILY MEDICINE CLINIC | Facility: CLINIC | Age: 84
End: 2019-07-31

## 2019-07-31 DIAGNOSIS — N39.0 URINARY TRACT INFECTION WITHOUT HEMATURIA, SITE UNSPECIFIED: Primary | ICD-10-CM

## 2019-07-31 RX ORDER — NITROFURANTOIN 25; 75 MG/1; MG/1
100 CAPSULE ORAL 2 TIMES DAILY
Qty: 14 CAPSULE | Refills: 0 | Status: SHIPPED | OUTPATIENT
Start: 2019-07-31 | End: 2019-08-07

## 2019-07-31 NOTE — TELEPHONE ENCOUNTER
Pt called stating she has been having UTI sxs via urinating frequently and bladder pressure x8d  Stated she is severly handicapped and wanted to see if we could send in a rx to [facility]  Please advise

## 2019-08-01 ENCOUNTER — APPOINTMENT (OUTPATIENT)
Dept: PHYSICAL THERAPY | Facility: CLINIC | Age: 84
End: 2019-08-01
Payer: MEDICARE

## 2019-08-05 ENCOUNTER — APPOINTMENT (OUTPATIENT)
Dept: PHYSICAL THERAPY | Facility: CLINIC | Age: 84
End: 2019-08-05
Payer: MEDICARE

## 2019-08-08 ENCOUNTER — APPOINTMENT (OUTPATIENT)
Dept: PHYSICAL THERAPY | Facility: CLINIC | Age: 84
End: 2019-08-08
Payer: MEDICARE

## 2019-08-12 ENCOUNTER — OFFICE VISIT (OUTPATIENT)
Dept: PHYSICAL THERAPY | Facility: CLINIC | Age: 84
End: 2019-08-12
Payer: MEDICARE

## 2019-08-12 DIAGNOSIS — I89.0 LYMPHEDEMA: ICD-10-CM

## 2019-08-12 DIAGNOSIS — R60.0 LOCALIZED EDEMA: Primary | ICD-10-CM

## 2019-08-12 PROCEDURE — 97140 MANUAL THERAPY 1/> REGIONS: CPT | Performed by: PHYSICAL THERAPIST

## 2019-08-12 NOTE — PROGRESS NOTES
Daily Note     Today's date: 2019  Patient name: Musa Bingham  : 1935  MRN: [id number]  Referring provider: Armani Little DO  Dx:   Encounter Diagnosis     ICD-10-CM    1  Localized edema R60 0    2  Lymphedema I89 0                   Subjective: Pt reports 10/10 PN sharp PN within each of her legs  Unable to wrap her legs due to PN  Objective: See treatment diary below    Assessment: Tolerated treatment fair  Patient would benefit from continued PT Still reduced vol compared to IE, but increased compared to last visit as to be expected given hiatus in care and also pt unable to carmelita compression wraps due to severe B LE PN  Plan: Continue per plan of care  Precautions: WC bound, requires Max A to transfer, cant carmelita supine, SCI  B LE lymphedema     Daily Treatment Diary     Manual   6-24 7-2 7-9 7-22 7-25 8-12   6-13   B LE solaris ready wrap donning + [facility]   MLD B LE - modified tx [facility] jph jph h [facility]                              x45' x50' x50' x45' x50' x50' x50'    x45'       Exercise Diary                                                                                                                                                                                                                                                                                      Modalities

## 2019-08-15 ENCOUNTER — OFFICE VISIT (OUTPATIENT)
Dept: PHYSICAL THERAPY | Facility: CLINIC | Age: 84
End: 2019-08-15
Payer: MEDICARE

## 2019-08-15 DIAGNOSIS — R60.0 LOCALIZED EDEMA: Primary | ICD-10-CM

## 2019-08-15 DIAGNOSIS — I89.0 LYMPHEDEMA: ICD-10-CM

## 2019-08-15 PROCEDURE — 97140 MANUAL THERAPY 1/> REGIONS: CPT | Performed by: PHYSICAL THERAPIST

## 2019-08-15 NOTE — PROGRESS NOTES
Daily Note     Today's date: 8/15/2019  Patient name: Kannan Osborn  : 1935  MRN: [id number]  Referring provider: Sallie Watters DO  Dx:   Encounter Diagnosis     ICD-10-CM    1  Localized edema R60 0    2  Lymphedema I89 0                   Subjective: Pt admits PN slightly less, but still severe and sleep disturbed as she has been uncomfortable w/ her new chair she sleeps in at home  Objective: See treatment diary below    Assessment: Tolerated treatment fair  Patient would benefit from continued PT B feet, R>L edema worsened this day however B lower legs reduced compared to earlier in this wk's session  Plan: Continue per plan of care  Precautions: WC bound, requires Max A to transfer, cant carmelita supine, SCI  B LE lymphedema     Daily Treatment Diary     Manual  6 6-24 7-2 7-9 7-22 7-25 8-12 8-15  6-13   B LE solaris ready wrap donning + instruction [facility]   MLD B LE - modified tx h h jph jph h h [facility]                              x45' x50' x50' x45' x50' x50' x50'  x50'  x45'       Exercise Diary                                                                                                                                                                                                                                                                                      Modalities

## 2019-08-19 ENCOUNTER — OFFICE VISIT (OUTPATIENT)
Dept: PHYSICAL THERAPY | Facility: CLINIC | Age: 84
End: 2019-08-19
Payer: MEDICARE

## 2019-08-19 DIAGNOSIS — R60.0 LOCALIZED EDEMA: Primary | ICD-10-CM

## 2019-08-19 DIAGNOSIS — I89.0 LYMPHEDEMA: ICD-10-CM

## 2019-08-19 PROCEDURE — 97140 MANUAL THERAPY 1/> REGIONS: CPT | Performed by: PHYSICAL THERAPIST

## 2019-08-19 NOTE — PROGRESS NOTES
Daily Note     Today's date: 2019  Patient name: Musa Bingham  : 1935  MRN: [id number]  Referring provider: Armani Little DO  Dx:   Encounter Diagnosis     ICD-10-CM    1  Localized edema R60 0    2  Lymphedema I89 0                   Subjective: Pt reports severe PN persists in B LE, R>L w/ inability to take PN pill as it was causing other complications  Objective: See treatment diary below    Assessment: Tolerated treatment fair  Patient would benefit from continued PT Pt has fair response to tx, requests very light application of ready wraps due to sensitivity in legs more severe as of late  Plan: Continue per plan of care  Precautions: WC bound, requires Max A to transfer, cant carmelita supine, SCI  B LE lymphedema       Daily Treatment Diary    Manual  6 6-24 7-2 7-9 7-22 7-25 8-12 8-15 8-19    B LE solaris ready wrap donning + [facility]    MLD B LE - modified [facility]                               x45' x50' x50' x45' x50' x50' x50'  x50' x50'        Exercise Diary                                                                                                                                                                                                                                                                                      Modalities

## 2019-08-20 DIAGNOSIS — G89.4 PAIN SYNDROME, CHRONIC: ICD-10-CM

## 2019-08-20 DIAGNOSIS — M48.00 SPINAL STENOSIS, UNSPECIFIED SPINAL REGION: ICD-10-CM

## 2019-08-22 ENCOUNTER — OFFICE VISIT (OUTPATIENT)
Dept: PHYSICAL THERAPY | Facility: CLINIC | Age: 84
End: 2019-08-22
Payer: MEDICARE

## 2019-08-22 DIAGNOSIS — I89.0 LYMPHEDEMA: ICD-10-CM

## 2019-08-22 DIAGNOSIS — R60.0 LOCALIZED EDEMA: Primary | ICD-10-CM

## 2019-08-22 PROCEDURE — 97140 MANUAL THERAPY 1/> REGIONS: CPT | Performed by: PHYSICAL THERAPIST

## 2019-08-22 NOTE — PROGRESS NOTES
Daily Note     Today's date: 2019  Patient name: Kannan Osborn  : 1935  MRN: [id number]  Referring provider: Sallie Watters DO  Dx:   Encounter Diagnosis     ICD-10-CM    1  Localized edema R60 0    2  Lymphedema I89 0                   Subjective: Pt reports less severe PN today, but still pretty bad, pt taking her time release PN rx which she admits does help  Objective: See treatment diary below    Assessment: Tolerated treatment fair  Patient would benefit from continued PT  Better carmelita to palpation and MLD this day, maintains light application of wraps  Plan: Continue per plan of care  Precautions: WC bound, requires Max A to transfer, cant carmelita supine, SCI  B LE lymphedema       Daily Treatment Diary    Manual  6- 6-24 7-2 7-9 7-22 7-25 8-12 8-15 8-19 822   B LE solaris ready wrap donning + instruction [facility]   MLD B LE - modified tx [facility]                              x45' x50' x50' x45' x50' x50' x50'  x50' x50' x50       Exercise Diary                                                                                                                                                                                                                                                                                      Modalities

## 2019-08-26 ENCOUNTER — APPOINTMENT (OUTPATIENT)
Dept: PHYSICAL THERAPY | Facility: CLINIC | Age: 84
End: 2019-08-26
Payer: MEDICARE

## 2019-08-28 ENCOUNTER — TELEPHONE (OUTPATIENT)
Dept: INFUSION CENTER | Facility: CLINIC | Age: 84
End: 2019-08-28

## 2019-08-28 ENCOUNTER — HOSPITAL ENCOUNTER (OUTPATIENT)
Dept: INFUSION CENTER | Facility: CLINIC | Age: 84
End: 2019-08-28

## 2019-08-29 ENCOUNTER — HOSPITAL ENCOUNTER (OUTPATIENT)
Dept: INFUSION CENTER | Facility: CLINIC | Age: 84
Discharge: HOME | End: 2019-08-29

## 2019-08-29 ENCOUNTER — APPOINTMENT (OUTPATIENT)
Dept: PHYSICAL THERAPY | Facility: CLINIC | Age: 84
End: 2019-08-29
Payer: MEDICARE

## 2019-08-29 NOTE — PROGRESS NOTES
Pt here for port flush  Port flushed well with brisk blood return noted    Pt made her next appt but declined AVS

## 2019-08-29 NOTE — PLAN OF CARE
Problem: Potential for Falls  Goal: Patient will remain free of falls  Description  INTERVENTIONS:  - Assess patient frequently for physical needs  -  Identify cognitive and physical deficits and behaviors that affect risk of falls    -  [place] fall precautions as indicated by assessment   - Educate patient/family on patient safety including physical limitations  - Instruct patient to call for assistance with activity based on assessment  - Modify environment to reduce risk of injury  - Consider OT/PT consult to assist with strengthening/mobility  Outcome: Progressing

## 2019-09-03 ENCOUNTER — TELEPHONE (OUTPATIENT)
Dept: FAMILY MEDICINE CLINIC | Facility: CLINIC | Age: 84
End: 2019-09-03

## 2019-09-03 ENCOUNTER — OFFICE VISIT (OUTPATIENT)
Dept: PHYSICAL THERAPY | Facility: CLINIC | Age: 84
End: 2019-09-03
Payer: MEDICARE

## 2019-09-03 DIAGNOSIS — G89.4 PAIN SYNDROME, CHRONIC: ICD-10-CM

## 2019-09-03 DIAGNOSIS — M48.00 SPINAL STENOSIS, UNSPECIFIED SPINAL REGION: ICD-10-CM

## 2019-09-03 DIAGNOSIS — I89.0 LYMPHEDEMA: ICD-10-CM

## 2019-09-03 DIAGNOSIS — R60.0 LOCALIZED EDEMA: Primary | ICD-10-CM

## 2019-09-03 PROCEDURE — 97140 MANUAL THERAPY 1/> REGIONS: CPT | Performed by: PHYSICAL THERAPIST

## 2019-09-03 NOTE — TELEPHONE ENCOUNTER
Pt called to inform pain is worst with taking Nucynta er pt would like to know if there is something different she can take  Please advice

## 2019-09-03 NOTE — PROGRESS NOTES
PT Re-Evaluation     Today's date: 9/3/2019  Patient name: Amauri Selby  : 1935  MRN: [id number]  Referring provider: Valentine Villafana DO  Dx:   Encounter Diagnosis     ICD-10-CM    1  Localized edema R60 0    2  Lymphedema I89 0                   Assessment  Assessment details: Pt evaluated this day regarding severe B LE PN and worsened B LE edema w/ chronic hx of B LE edema likely associated w/ PMHx including failed spinal fusion, SCI, and dependent edema of B LE given pt's sedentary state requiring 1:1 care, wheelchair dependent w/ inability to tolerate laying supine  Baseline measurements, + stemmer's sign, pitting, and marked fibrosis of B LE, along w/ hemosiderin staining and significant skin changes including dry scaly skin, redness, and presence of numerous L>R LE blisters + B foot dorsal buffalo hump all suggest exacerbation of LE lymphedema  Skilled PT is necessary to address,however there is concern regarding transportation issue and transfer ability as pt is WC dependent and unable to tolerate supine positioning also requiring Max A x1 for change of clothes, donning garments, and to transfer  Pt may benefit and be more appropriate for home care skilled lymphedema CDT protocol if suitable lymphedema therapist can be located for home care within timely boundaries  6-10-19 Update:  Pt demonstrates improved B LE skin integrity, sig vol reduction B lower legs  Primary limiting factor remains severe B LE PN and dependence on seated positions as pt is unable to tolerate supine, contributing to dependent edema  Cont'd skilled PT is necessary to positively address limitations including B LE lymphedema and severe skin fibrosis  9-3-19 Update:  Pt demonstrates cont'd vol reduction B lower legs w/ reduced fibrosis and significant skin health improvement w/ minimal flaking, reduced scaling to minimal, but still moderate fibrosis, redness, and ttp    Greatest concern remains heaviness and slight increase in edema of feet/ankles w/ minimal compression provided in these areas  Cont'd skilled PT is advised w/ anticipated transition to self management within the next few wks w/ A from pt's   Barriers to therapy: Transportation, poor health status, inability to maintain supine positioning, dependency for locomotion and transfers  Understanding of Dx/Px/POC: good   Prognosis: fair    Goals  ST  Reduce PN <4/10 w/ all activity within 3 wks - Not met  2  Reduce B LE girth measurements >1 cm within 3 wks -MET  LT  Maintain B LE solaris ready wraps daily within 5 wks -Partially met  2  Reduce PN <2/10 w/ all activity within 5 wks  -Not met  3  Able to apply B LE lotion daily and self MLD techniques as needed within 5 wks -Partially met    Plan  Patient would benefit from: skilled physical therapy  Referral necessary: Yes  Planned therapy interventions: manual therapy, massage and compression  Frequency: 2x week  Duration in visits: 8  Duration in weeks: 4  Plan of Care beginning date: 9/3/2019  Plan of Care expiration date: 10/1/2019  Treatment plan discussed with: patient and family        Subjective Evaluation    History of Present Illness  Mechanism of injury: Pt presents w/ c/o significantly worsened B LE edema w/ inability to don LE compression stockings due to increased edema, worsened PN, and sensitivity to skin of B lower legs  Pt admits chronic hx of B LE edema  Pt also presents w/ numerous challenges including wheelchair dependent and requiring constant 1:1 care as  is primary caretaker w/ home health aides also providing assistance  Pt had in the past received home health skilled PT/ymphedema treatment w/ successful management  6-10-19 Update:  Pt reports still a difficulty maintaining wraps on legs due to severe hypersensitivity and PN  Pt has trialed utilizing compression pump at home of varied compliance, PN again limiting tolerance    Pt does feel like she has developed several new blisters when previously she noticed just about all had dissipated w/ skilled care  Pt admits leg vol reduced overall in size  9-3-19 Update:  Pt admits cont'd improvement in skin health, less PN since discontinuing her PN meds in favor of NSAID's  Pt does report her feet are more swollen w/ recent hiatus in care from PT being away on vacation  Pt admits sporadic usage of LE compression pump at home  Recurrent probem    Quality of life: fair    Pain  Current pain ratin  At best pain ratin  At worst pain ratin  Location: B LE  Quality: burning, cramping, discomfort, sharp, tight and squeezing    Treatments  Previous treatment: physical therapy  Patient Goals  Patient goals for therapy: decreased edema, decreased pain and increased motion          Objective     General Comments: Ankle/Foot Comments   Physical assessment:  Palpation: Mild to mod ttp B lower leg  Skin Mobility: Fair B lower legs w/ moderate fibrosis, L>R  Skin color: Reduced redness t/o w/ + hemosiderin staining B lower legs from just below tibial tubercle to dorsum of B feet  Pitting: +1  Wound presence:  L LE, numerous blisters, closed  Wound size: n/a  Wound color: n/a  Stemmer's Sign:  + B LE  Gait assessment: wheelchair dependent  Transfer status: Max A x1  Ability to don/doff clothing/garments:  Mod A      Flowsheet Rows      Most Recent Value   girth measurments   Extremity   Lower extremity   LE Girth Measurments  Forefoot, Ankle Figure 8, Malleoli, M+10cm, M+20cm, M+30cm, M+40cm   Forefoot   L Forefoot Initial girth  24 75 cm   L Forefoot Updated Girth  24 25 cm   L Forefoot Girth Calculation  -0 5 cm   R Forefoot Initial girth  26 2   R Forefoot Updated Girth  24   L Forefoot girth calculation  -2 2   Ankle Figure 8   L Ankle Figure 8 Initial girth  56 cm   L Ankle Figure 8 Updated Girth  59 5 cm   L Ankle Figure 8 Girth Calculation  3 5 cm   R Ankle Figure 8 Initial girth  56 cm   R Ankle Figure 8 Updated Girth  57 cm   R Ankle Figure 8 Girth Calculation  1 cm   Malleoli   L Malleoli Initial girth  26 cm   L Malleoli Updated girth  29 cm   L Malleoli Girth Calculation  3 cm   R Malleoli Initial girth  28 cm   R Malleoli Updated girth  25 75 cm   R Malleoli Girth Calculation  -2 25 cm   M+10cm    L M+10cm Initial girth  32 5 cm   L M+10cm Updated girth  24 cm   L M+10cm Girth Calculation  -8 5 cm   R M+10cm Initial girth  27 25 cm   R M+10cm Updated girth  23 5 cm   R M+10cm Girth Calculation  -3 75 cm   M+20cm    L M+20cm Initial girth  44 25 cm   L M+20cm Updated girth  33 75 cm   L M+20cm Girth Calculation  -10 5 cm   R M+20cm Initial girth  41 25 cm   R M+20cm Updated girth  31 5 cm   R M+20cm Girth Calculation  -9 75 cm   M+30cm    L M+30cm Initial girth  41 cm   L M+30cm Updated girth  37 cm   L M+30cm Girth Calculation  -4 cm   R M+30cm Initial girth  39 5 cm   R M+30cm Updated girth  36 75 cm   R M+30cm Girth Calculation  -2 75 cm   M+40cm    L M+40cm Initial girth  48 cm   L M+40cm Updated girth  44 5 cm   L M+40cm Girth Calculation  -3 5 cm   R M+40cm Initial girth  47 cm   R M+40cm Updated girth  41 5 cm   R M+40cm Girth Calculation  -5 5 cm                     Precautions: LOB/FALL RISK, WHEELCHAIR BOUND     Daily Treatment Diary     Manual  9-3            Modified B LE MLD  [facility]            B LE solaris ready wraps [facility]                                       x50'                Exercise Diary                                                                                                                                                                                                                                                                                      Modalities

## 2019-09-03 NOTE — TELEPHONE ENCOUNTER
Would not recommend a change to other pain medications  They will not be as safe as this 1  We can increase the dosage from  mg  I will send in a new prescription to the pharmacy  Will just need to be careful about over sedation on the higher dosage

## 2019-09-04 NOTE — TELEPHONE ENCOUNTER
Pt states she is ok to try oxycodone again for the pain pt informed that when she used to takes Oxycodone in the past she becomes itchy

## 2019-09-05 ENCOUNTER — OFFICE VISIT (OUTPATIENT)
Dept: PHYSICAL THERAPY | Facility: CLINIC | Age: 84
End: 2019-09-05
Payer: MEDICARE

## 2019-09-05 DIAGNOSIS — I89.0 LYMPHEDEMA: ICD-10-CM

## 2019-09-05 DIAGNOSIS — R60.0 LOCALIZED EDEMA: Primary | ICD-10-CM

## 2019-09-05 PROCEDURE — 97140 MANUAL THERAPY 1/> REGIONS: CPT | Performed by: PHYSICAL THERAPIST

## 2019-09-05 NOTE — TELEPHONE ENCOUNTER
If oxycodone has cause itching in the past I do not think it is a good idea to try it again  It is possible that you she could have in any even worse reaction at this time  This does not leave us with any good options for medication to treat her pain that would be any better then Nucynta

## 2019-09-05 NOTE — PROGRESS NOTES
Daily Note     Today's date: 2019  Patient name: Reji Mendez  : 1935  MRN: [id number]  Referring provider: Sofya Aguilera DO  Dx:   Encounter Diagnosis     ICD-10-CM    1  Localized edema R60 0    2  Lymphedema I89 0                   Subjective: Pt reports PN more sig today vs last session, but was relatively PN "minimized" until today this wk  Objective: See treatment diary below    Assessment: Tolerated treatment fair  Patient would benefit from continued PT  Maintained lighter application of wraps as pt had more sig ankle PN this day, R>L  Vol again reducing in B LE slightly  Plan: Continue per plan of care  Precautions: WC bound, requires Max A to transfer, cant carmelita supine, SCI  B LE lymphedema       Daily Treatment Diary    Manual  9-5            B LE solaris ready wrap donning + instruction [facility]            MLD B LE - modified tx [facility]                                       x50'                Exercise Diary                                                                                                                                                                                                                                                                                      Modalities

## 2019-09-09 ENCOUNTER — OFFICE VISIT (OUTPATIENT)
Dept: PHYSICAL THERAPY | Facility: CLINIC | Age: 84
End: 2019-09-09
Payer: MEDICARE

## 2019-09-09 DIAGNOSIS — I89.0 LYMPHEDEMA: ICD-10-CM

## 2019-09-09 DIAGNOSIS — R60.0 LOCALIZED EDEMA: Primary | ICD-10-CM

## 2019-09-09 PROCEDURE — 97140 MANUAL THERAPY 1/> REGIONS: CPT | Performed by: PHYSICAL THERAPIST

## 2019-09-09 NOTE — PROGRESS NOTES
Daily Note     Today's date: 2019  Patient name: Roverto Araujo  : 1935  MRN: [id number]  Referring provider: Madhavi Encarnacion DO  Dx:   Encounter Diagnosis     ICD-10-CM    1  Localized edema R60 0    2  Lymphedema I89 0                   Subjective: Pt admits less PN in legs today vs last wk  Objective: See treatment diary below    Assessment: Tolerated treatment fair  Patient would benefit from continued PT  Better carmelita to B LE wrap application this day and MLD w/ improved skin integrity but still a noticeable worsening of dorsal foot edema, L>R  Plan: Continue per plan of care  Precautions: WC bound, requires Max A to transfer, cant carmelita supine, SCI  B LE lymphedema       Daily Treatment Diary    Manual  9-5 9-9           B LE solaris ready wrap donning + instruction [facility]           MLD B LE - modified tx [facility]                                      x50' x50'               Exercise Diary                                                                                                                                                                                                                                                                                      Modalities

## 2019-09-10 DIAGNOSIS — R60.9 EDEMA, UNSPECIFIED TYPE: ICD-10-CM

## 2019-09-11 RX ORDER — FUROSEMIDE 40 MG/1
TABLET ORAL
Qty: 180 TABLET | Refills: 0 | Status: SHIPPED | OUTPATIENT
Start: 2019-09-11 | End: 2020-02-10

## 2019-09-12 ENCOUNTER — APPOINTMENT (OUTPATIENT)
Dept: PHYSICAL THERAPY | Facility: CLINIC | Age: 84
End: 2019-09-12
Payer: MEDICARE

## 2019-09-16 ENCOUNTER — OFFICE VISIT (OUTPATIENT)
Dept: PHYSICAL THERAPY | Facility: CLINIC | Age: 84
End: 2019-09-16
Payer: MEDICARE

## 2019-09-16 DIAGNOSIS — I89.0 LYMPHEDEMA: ICD-10-CM

## 2019-09-16 DIAGNOSIS — R60.0 LOCALIZED EDEMA: Primary | ICD-10-CM

## 2019-09-16 PROCEDURE — 97140 MANUAL THERAPY 1/> REGIONS: CPT | Performed by: PHYSICAL THERAPIST

## 2019-09-16 NOTE — PROGRESS NOTES
Daily Note     Today's date: 2019  Patient name: Alize Blankenship  : 1935  MRN: [id number]  Referring provider: Nadene Sicard, DO  Dx:   Encounter Diagnosis     ICD-10-CM    1  Localized edema R60 0    2  Lymphedema I89 0                   Subjective: Pt presents w/ new WC, reporting that her legs are uncomfortable in it, must adjust some of the padding  Objective: See treatment diary below    Assessment: Tolerated treatment fair  Patient would benefit from continued PT  Reduced fibrosis noted B LE w/ less edema noted thru B dorsum of feet  Plan: Continue per plan of care  Precautions: WC bound, requires Max A to transfer, cant carmelita supine, SCI  B LE lymphedema       Daily Treatment Diary    Manual  9-5 9-9 9-16          B LE solaris ready wrap donning + instruction [facility]          MLD B LE - modified tx [facility]                                     x50' x50' x50'              Exercise Diary                                                                                                                                                                                                                                                                                      Modalities

## 2019-09-19 ENCOUNTER — OFFICE VISIT (OUTPATIENT)
Dept: PHYSICAL THERAPY | Facility: CLINIC | Age: 84
End: 2019-09-19
Payer: MEDICARE

## 2019-09-19 DIAGNOSIS — I89.0 LYMPHEDEMA: ICD-10-CM

## 2019-09-19 DIAGNOSIS — R60.0 LOCALIZED EDEMA: Primary | ICD-10-CM

## 2019-09-19 PROCEDURE — 97140 MANUAL THERAPY 1/> REGIONS: CPT | Performed by: PHYSICAL THERAPIST

## 2019-09-19 NOTE — PROGRESS NOTES
Daily Note     Today's date: 2019  Patient name: Chuckie Alvarez  : 1935  MRN: [id number]  Referring provider: Adiel Artis DO  Dx:   Encounter Diagnosis     ICD-10-CM    1  Localized edema R60 0    2  Lymphedema I89 0                   Subjective: Pt reports she is becoming more acclimated to new WC, but still has some adjustments to be made  Objective: See treatment diary below    Assessment: Tolerated treatment fair  Patient would benefit from continued PT Slight increase in redness of skin observed this day R lower leg, otherwise fibrosis moderate but reducing and vol under fair control  Plan: Continue per plan of care  Precautions: WC bound, requires Max A to transfer, cant carmelita supine, SCI  B LE lymphedema       Daily Treatment Diary    Manual  9-5 9-9 9-16 919         B LE solaris ready wrap donning + instruction [facility]         MLD B LE - modified tx [facility]                                    x50' x50' x50' x50'             Exercise Diary                                                                                                                                                                                                                                                                                      Modalities

## 2019-09-23 ENCOUNTER — OFFICE VISIT (OUTPATIENT)
Dept: PHYSICAL THERAPY | Facility: CLINIC | Age: 84
End: 2019-09-23
Payer: MEDICARE

## 2019-09-23 DIAGNOSIS — I89.0 LYMPHEDEMA: ICD-10-CM

## 2019-09-23 DIAGNOSIS — R60.0 LOCALIZED EDEMA: Primary | ICD-10-CM

## 2019-09-23 PROCEDURE — 97140 MANUAL THERAPY 1/> REGIONS: CPT | Performed by: PHYSICAL THERAPIST

## 2019-09-23 NOTE — PROGRESS NOTES
Daily Note     Today's date: 2019  Patient name: Michoacano Tobar  : 1935  MRN: [id number]  Referring provider: Bijan Alfaro DO  Dx:   Encounter Diagnosis     ICD-10-CM    1  Localized edema R60 0    2  Lymphedema I89 0                   Subjective: Pt reports she is still uncomfortable in her WC, leads to more PN in legs  Managing wraps and feels legs are doing fair  Objective: See treatment diary below    Assessment: Tolerated treatment fair  Patient would benefit from continued PT Mod fibrosis remains, vol reduced, plateau in POC near w/ anticipated d/c upcoming  Plan: Continue per plan of care  Precautions: WC bound, requires Max A to transfer, cant carmelita supine, SCI  B LE lymphedema       Daily Treatment Diary    Manual  9-5 9-9 9-16         B LE solaris ready wrap donning + instruction [facility]        MLD B LE - modified tx [facility]                                   x50' x50' x50' x50' x45'            Exercise Diary                                                                                                                                                                                                                                                                                      Modalities

## 2019-09-26 ENCOUNTER — OFFICE VISIT (OUTPATIENT)
Dept: PHYSICAL THERAPY | Facility: CLINIC | Age: 84
End: 2019-09-26
Payer: MEDICARE

## 2019-09-26 ENCOUNTER — TELEPHONE (OUTPATIENT)
Dept: FAMILY MEDICINE CLINIC | Facility: CLINIC | Age: 84
End: 2019-09-26

## 2019-09-26 DIAGNOSIS — I89.0 LYMPHEDEMA: ICD-10-CM

## 2019-09-26 DIAGNOSIS — R60.0 LOCALIZED EDEMA: Primary | ICD-10-CM

## 2019-09-26 PROCEDURE — 97140 MANUAL THERAPY 1/> REGIONS: CPT | Performed by: PHYSICAL THERAPIST

## 2019-09-26 NOTE — TELEPHONE ENCOUNTER
Pt lmom inquiring about [facility] rx in for reclining chair  Stated that she sleeps in a reclining chair every night and heard Medicare would cover a new chair  I lmom to have pt call back and specify if it's for a chair lift for the chair  Spoke daphney/ Reba in [employer] to the matter  As well where pt would like rx sent if so Jaiden gave me [facility] via [phone] as a supplier to try

## 2019-09-26 NOTE — PROGRESS NOTES
Daily Note     Today's date: 2019  Patient name: Yusra Kruger  : 1935  MRN: [id number]  Referring provider: Emelia Duarte DO  Dx:   Encounter Diagnosis     ICD-10-CM    1  Localized edema R60 0    2  Lymphedema I89 0                   Subjective: Pt reports she is happy w/ her legs and the level of improvement, she only hopes that they stay as good as they are  Objective: See treatment diary below    Assessment: Tolerated treatment fair  Patient would benefit from continued PT Mod fibrosis remains, but better overall and improved since last visit  Discussed d/c planning w/ pt and maintaining garments at home  Plan: Continue per plan of care  Precautions: WC bound, requires Max A to transfer, cant carmelita supine, SCI  B LE lymphedema       Daily Treatment Diary    Manual  9-5 9-9 16        B LE solaris ready wrap donning + instruction [facility]       MLD B LE - [facility]                                  x50' x50' x50' x50' x45' x45'           Exercise Diary                                                                                                                                                                                                                                                                                      Modalities

## 2019-09-30 ENCOUNTER — OFFICE VISIT (OUTPATIENT)
Dept: PHYSICAL THERAPY | Facility: CLINIC | Age: 84
End: 2019-09-30
Payer: MEDICARE

## 2019-09-30 DIAGNOSIS — R60.0 LOCALIZED EDEMA: Primary | ICD-10-CM

## 2019-09-30 DIAGNOSIS — I89.0 LYMPHEDEMA: ICD-10-CM

## 2019-09-30 PROCEDURE — 97140 MANUAL THERAPY 1/> REGIONS: CPT | Performed by: PHYSICAL THERAPIST

## 2019-09-30 NOTE — PROGRESS NOTES
Daily Note     Today's date: 2019  Patient name: Kannan Osborn  : 1935  MRN: [id number]  Referring provider: Sallie Watters DO  Dx:   Encounter Diagnosis     ICD-10-CM    1  Localized edema R60 0    2  Lymphedema I89 0                   Subjective: Pt reports she feels her legs are doing fair still, less sig PN this wk, but that varies  Objective: See treatment diary below    Assessment: Tolerated treatment fair  Patient would benefit from continued PT Mod soft tissue fibrosis and dorsal foot edema B remains the predominent issue w/ vol thru lower legs sig reduced and being managed better w/ more consistent use of wraps  Plan: Continue per plan of care  D/c NV  Precautions: WC bound, requires Max A to transfer, cant carmelita supine, SCI  B LE lymphedema       Daily Treatment Diary    Manual  9-5 9-9 9-16       B LE solaris ready wrap donning + instruction [facility]      MLD B LE - modified tx [facility]                                 x50' x50' x50' x50' x45' x45' x45'          Exercise Diary                                                                                                                                                                                                                                                                                      Modalities

## 2019-10-03 ENCOUNTER — APPOINTMENT (OUTPATIENT)
Dept: PHYSICAL THERAPY | Facility: CLINIC | Age: 84
End: 2019-10-03
Payer: MEDICARE

## 2019-10-07 ENCOUNTER — APPOINTMENT (OUTPATIENT)
Dept: PHYSICAL THERAPY | Facility: CLINIC | Age: 84
End: 2019-10-07
Payer: MEDICARE

## 2019-10-16 ENCOUNTER — APPOINTMENT (OUTPATIENT)
Dept: PHYSICAL THERAPY | Facility: CLINIC | Age: 84
End: 2019-10-16
Payer: MEDICARE

## 2019-10-17 ENCOUNTER — APPOINTMENT (OUTPATIENT)
Dept: LAB | Facility: CLINIC | Age: 84
End: 2019-10-17
Payer: MEDICARE

## 2019-10-17 ENCOUNTER — OFFICE VISIT (OUTPATIENT)
Dept: FAMILY MEDICINE CLINIC | Facility: CLINIC | Age: 84
End: 2019-10-17
Payer: MEDICARE

## 2019-10-17 VITALS — RESPIRATION RATE: 17 BRPM | TEMPERATURE: 98 F | OXYGEN SATURATION: 98 % | HEART RATE: 67 BPM

## 2019-10-17 DIAGNOSIS — R53.83 FATIGUE, UNSPECIFIED TYPE: ICD-10-CM

## 2019-10-17 DIAGNOSIS — I89.0 LYMPHEDEMA OF BOTH LOWER EXTREMITIES: ICD-10-CM

## 2019-10-17 DIAGNOSIS — R73.9 ELEVATED BLOOD SUGAR: ICD-10-CM

## 2019-10-17 DIAGNOSIS — D63.8 ANEMIA OF CHRONIC DISEASE: ICD-10-CM

## 2019-10-17 DIAGNOSIS — R21 RASH: ICD-10-CM

## 2019-10-17 DIAGNOSIS — Z23 NEED FOR VACCINATION: Primary | ICD-10-CM

## 2019-10-17 LAB
ALBUMIN SERPL BCP-MCNC: 3.7 G/DL (ref 3.5–5)
ALP SERPL-CCNC: 77 U/L (ref 46–116)
ALT SERPL W P-5'-P-CCNC: 24 U/L (ref 12–78)
ANION GAP SERPL CALCULATED.3IONS-SCNC: 5 MMOL/L (ref 4–13)
AST SERPL W P-5'-P-CCNC: 13 U/L (ref 5–45)
BASOPHILS # BLD AUTO: 0.01 THOUSANDS/ΜL (ref 0–0.1)
BASOPHILS NFR BLD AUTO: 0 % (ref 0–1)
BILIRUB SERPL-MCNC: 0.38 MG/DL (ref 0.2–1)
BUN SERPL-MCNC: 21 MG/DL (ref 5–25)
CALCIUM SERPL-MCNC: 9.9 MG/DL (ref 8.3–10.1)
CHLORIDE SERPL-SCNC: 103 MMOL/L (ref 100–108)
CO2 SERPL-SCNC: 33 MMOL/L (ref 21–32)
CREAT SERPL-MCNC: 0.78 MG/DL (ref 0.6–1.3)
EOSINOPHIL # BLD AUTO: 0.17 THOUSAND/ΜL (ref 0–0.61)
EOSINOPHIL NFR BLD AUTO: 3 % (ref 0–6)
ERYTHROCYTE [DISTWIDTH] IN BLOOD BY AUTOMATED COUNT: 15.3 % (ref 11.6–15.1)
EST. AVERAGE GLUCOSE BLD GHB EST-MCNC: 105 MG/DL
GFR SERPL CREATININE-BSD FRML MDRD: 70 ML/MIN/1.73SQ M
GLUCOSE SERPL-MCNC: 85 MG/DL (ref 65–140)
HBA1C MFR BLD: 5.3 % (ref 4.2–6.3)
HCT VFR BLD AUTO: 36.7 % (ref 34.8–46.1)
HGB BLD-MCNC: 11.5 G/DL (ref 11.5–15.4)
IMM GRANULOCYTES # BLD AUTO: 0.02 THOUSAND/UL (ref 0–0.2)
IMM GRANULOCYTES NFR BLD AUTO: 0 % (ref 0–2)
LYMPHOCYTES # BLD AUTO: 1.06 THOUSANDS/ΜL (ref 0.6–4.47)
LYMPHOCYTES NFR BLD AUTO: 21 % (ref 14–44)
MCH RBC QN AUTO: 30.3 PG (ref 26.8–34.3)
MCHC RBC AUTO-ENTMCNC: 31.3 G/DL (ref 31.4–37.4)
MCV RBC AUTO: 97 FL (ref 82–98)
MONOCYTES # BLD AUTO: 0.52 THOUSAND/ΜL (ref 0.17–1.22)
MONOCYTES NFR BLD AUTO: 10 % (ref 4–12)
NEUTROPHILS # BLD AUTO: 3.38 THOUSANDS/ΜL (ref 1.85–7.62)
NEUTS SEG NFR BLD AUTO: 66 % (ref 43–75)
NRBC BLD AUTO-RTO: 0 /100 WBCS
PLATELET # BLD AUTO: 276 THOUSANDS/UL (ref 149–390)
PMV BLD AUTO: 10.5 FL (ref 8.9–12.7)
POTASSIUM SERPL-SCNC: 4.7 MMOL/L (ref 3.5–5.3)
PROT SERPL-MCNC: 7.8 G/DL (ref 6.4–8.2)
RBC # BLD AUTO: 3.8 MILLION/UL (ref 3.81–5.12)
SODIUM SERPL-SCNC: 141 MMOL/L (ref 136–145)
TSH SERPL DL<=0.05 MIU/L-ACNC: 1.52 UIU/ML (ref 0.36–3.74)
WBC # BLD AUTO: 5.16 THOUSAND/UL (ref 4.31–10.16)

## 2019-10-17 PROCEDURE — 83036 HEMOGLOBIN GLYCOSYLATED A1C: CPT

## 2019-10-17 PROCEDURE — 84443 ASSAY THYROID STIM HORMONE: CPT

## 2019-10-17 PROCEDURE — 85025 COMPLETE CBC W/AUTO DIFF WBC: CPT

## 2019-10-17 PROCEDURE — 99213 OFFICE O/P EST LOW 20 MIN: CPT | Performed by: FAMILY MEDICINE

## 2019-10-17 PROCEDURE — 36415 COLL VENOUS BLD VENIPUNCTURE: CPT

## 2019-10-17 PROCEDURE — 80053 COMPREHEN METABOLIC PANEL: CPT

## 2019-10-17 RX ORDER — CLOTRIMAZOLE AND BETAMETHASONE DIPROPIONATE 10; .64 MG/G; MG/G
CREAM TOPICAL 2 TIMES DAILY
Qty: 30 G | Refills: 0 | Status: SHIPPED | OUTPATIENT
Start: 2019-10-17

## 2019-10-17 NOTE — PROGRESS NOTES
[facility]      NAME: Ivan Lal  AGE: 80 y o  SEX: female  : 1935   MRN: [id number]    DATE: 10/17/2019  TIME: 1:51 PM    Assessment and Plan     Problem List Items Addressed This Visit     Anemia of chronic disease    Relevant Orders    CBC and differential    Elevated blood sugar    Relevant Orders    Comprehensive metabolic panel    Hemoglobin A1C    Lymphedema of both lower extremities    Relevant Orders    TSH, 3rd generation with Free T4 reflex      Other Visit Diagnoses     Need for vaccination    -  Primary    Relevant Orders    influenza vaccine, 7831-2031, high-dose, PF 0 5 mL (FLUZONE HIGH-DOSE)    Rash        Relevant Medications    clotrimazole-betamethasone (LOTRISONE) 1-0 05 % cream    Fatigue, unspecified type        Relevant Orders    TSH, 3rd generation with Free T4 reflex        Will order topical Lotrisone cream for rash  Will check routine blood work  Patient's last labs were approximately 1 year ago  Check CBC, CMP, TSH, hemoglobin A1c  Return to office in:  P r n  Chief Complaint     Chief Complaint   Patient presents with    red spot on right arm       History of Present Illness     Patient was seen for chief complaint of a rash on her right forearm  Is been present for several weeks  She has use topical hydrocortisone and nystatin without improvement  She also complains of generalized fatigue and malaise  She has chronic pain issues and generally does not feel well but feels that her symptoms are significantly worse in the last week  She does not complain of chest pain or shortness of breath    She has increased musculoskeletal pain and more fatigued than usual       The following portions of the patient's history were reviewed and updated as appropriate: allergies, current medications, past family history, past medical history, past social history, past surgical history and problem list     Review of Systems   Review of Systems Constitutional: Positive for fatigue  Respiratory: Negative  Cardiovascular: Negative  Gastrointestinal: Negative  Genitourinary: Negative  Musculoskeletal: Positive for arthralgias and gait problem  Skin: Positive for rash  Psychiatric/Behavioral: Positive for dysphoric mood  Active Problem List     Patient Active Problem List   Diagnosis    Pain syndrome, chronic    Degenerative disc disease, cervical    Crohn's disease ([facility] [address] )    Spinal stenosis    BRBPR (bright red blood per rectum)    Spinal cord injury    Edema    Nummular eczema    Dermatitis    Abdominal pain of unknown etiology    Ambulatory dysfunction    Anemia of chronic disease    Anxiety    Arthropathy, lower leg    Cataract, left    Depression    Elevated blood sugar    Physical debility    Headache    Low back pain    Lymphedema of both lower extremities    Myalgia    Noninfectious lymphedema    Pain medication agreement    Pain of right upper extremity    Pitting edema    Postherpetic neuralgia    Radiculopathy, cervical    Sacroiliitis (HCC)    Thoracic or lumbosacral neuritis or radiculitis    High risk medications (not anticoagulants) long-term use    H/O spinal cord injury       Objective   Pulse 67   Temp 98 °F (36 7 °C) (Tympanic)   Resp 17   SpO2 98%     Physical Exam   Constitutional: She is oriented to person, place, and time  She appears well-developed and well-nourished  No distress  HENT:   Head: Normocephalic and atraumatic  Eyes: Pupils are equal, round, and reactive to light  Conjunctivae are normal  Right eye exhibits no discharge  Neck: Normal range of motion  No thyromegaly present  Cardiovascular: Normal rate and regular rhythm  Pulmonary/Chest: Effort normal and breath sounds normal  No respiratory distress  Lymphadenopathy:     She has no cervical adenopathy  Neurological: She is alert and oriented to person, place, and time  Skin: Skin is warm and dry  She is not diaphoretic  Circular erythematous patch approximately 3 cm in diameter right forearm  Psychiatric: She has a normal mood and affect  Her behavior is normal  Judgment and thought content normal    Nursing note and vitals reviewed  Current Medications     Current Outpatient Medications:     betamethasone, augmented, (DIPROLENE-AF) 0 05 % cream, Apply topically 2 (two) times a day, Disp: 30 g, Rfl: 1    Bioflavonoid Products (AMANDO C PO), Take 1 tablet by mouth daily  , Disp: , Rfl:     Biotin 1000 MCG tablet, Take 1,000 mcg by mouth daily  , Disp: , Rfl:     brinzolamide (AZOPT) 1 % ophthalmic suspension, USE 1 DROP IN THE LEFT EYE THREE TIMES A DAY, Disp: , Rfl:     Calcium Carbonate (CALTRATE 600 PO), Take 2 tablets by mouth daily  , Disp: , Rfl:     Carboxymethylcell-Hypromellose (GENTEAL OP), Apply 1 drop to eye daily at bedtime  Both eyes, Disp: , Rfl:     celecoxib (CeleBREX) 200 mg capsule, Take 1 capsule (200 mg total) by mouth 2 (two) times a day, Disp: 60 capsule, Rfl: 2    Cyanocobalamin (VITAMIN B-12 PO), Take 1 tablet by mouth daily  , Disp: , Rfl:     diclofenac sodium (VOLTAREN) 1 %, Apply 2 g topically as needed  , Disp: , Rfl:     diclofenac-misoprostol (ARTHROTEC) 75-0 2 MG per tablet, TAKE ONE TABLET BY MOUTH EVERY DAY, Disp: , Rfl:     escitalopram (LEXAPRO) 5 mg tablet, Take 5 mg by mouth   2 times a week for depression, Disp: , Rfl:     furosemide (LASIX) 40 mg tablet, TAKE ONE TABLET BY MOUTH TWICE A DAY, Disp: 180 tablet, Rfl: 0    lidocaine (LIDODERM) 5 %, Apply 1 patch topically daily as needed for mild pain Remove & Discard patch within 12 hours or as directed by MD, Disp: 30 patch, Rfl: 0    Magnesium 250 MG TABS, Take 1 tablet by mouth 2 (two) times a day , Disp: , Rfl:     mesalamine (LIALDA) 1 2 G EC tablet, Take 2,400 mg by mouth daily with breakfast , Disp: , Rfl:     methocarbamol (ROBAXIN) 500 mg tablet, Take by mouth 4 times daily, Disp: , Rfl:    metolazone (ZAROXOLYN) 2 5 mg tablet, Take 1 tablet (2 5 mg total) by mouth daily, Disp: 30 tablet, Rfl: 2    Misc Natural Products (OSTEO BI-FLEX ADV DOUBLE ST PO), Take 1 tablet by mouth daily  , Disp: , Rfl:     Multiple Vitamins-Minerals (CENTRUM ADULTS PO), Take 1 tablet by mouth daily  , Disp: , Rfl:     Nutritional Supplements (VITAMIN D MAINTENANCE PO), Take 400 Int'l Units by mouth daily  , Disp: , Rfl:     nystatin (MYCOSTATIN) cream, Apply topically 2 (two) times a day Underneath breasts, Disp: 30 g, Rfl: 0    Omega-3 Fatty Acids (FISH OIL) 1200 MG CAPS, Take 1 tablet by mouth daily  , Disp: , Rfl:     Omeprazole 20 MG TBEC, Take 1 tablet by mouth daily  , Disp: , Rfl:     ondansetron (ZOFRAN) 8 mg tablet, Take 1 tablet (8 mg total) by mouth every 8 (eight) hours as needed for nausea or vomiting, Disp: 20 tablet, Rfl: 1    Polyethyl Glycol-Propyl Glycol (SYSTANE ULTRA OP), Apply to eye 3 (three) times a day  Both eyes, Disp: , Rfl:     potassium chloride (MICRO-K) 10 MEQ CR capsule, 2 caps qd, Disp: 60 capsule, Rfl: 05    PrednisoLONE Acetate (PRED MILD OP), Apply 1 drop to eye every other day  Left eye, Disp: , Rfl:     pregabalin (LYRICA) 75 mg capsule, Take 75 mg by mouth 2 (two) times a day , Disp: , Rfl:     Probiotic Product (SUPER PROBIOTIC DIGESTIVE PO), Take 1 tablet by mouth daily  , Disp: , Rfl:     sodium chloride () 2 % hypertonic ophthalmic solution, 1 drop 5 (five) times a day , Disp: , Rfl:     sodium chloride () 5 % hypertonic ophthalmic solution, Administer 2 drops into the left eye as needed  , Disp: , Rfl:     traMADol (ULTRAM) 50 mg tablet, Take 1 tablet by mouth every 6 (six) hours as needed for moderate pain, Disp: 10 tablet, Rfl: 0    vitamin E, tocopherol, 400 units capsule, Take 400 Units by mouth daily  , Disp: , Rfl:     clotrimazole-betamethasone (LOTRISONE) 1-0 05 % cream, Apply topically 2 (two) times a day, Disp: 30 g, Rfl: 0    Health Maintenance Health Maintenance   Topic Date Due    Medicare Annual Wellness Visit (AWV)  1935    DTaP,Tdap,and Td Vaccines (1 - Tdap) 05/04/1956    Fall Risk  05/04/2000    Urinary Incontinence Screening  05/04/2000    INFLUENZA VACCINE  07/01/2019    PT PLAN OF CARE  10/03/2019    BMI: Adult  06/18/2020    Pneumococcal Vaccine: 65+ Years  Completed    Pneumococcal Vaccine: Pediatrics (0 to 5 Years) and At-Risk Patients (6 to 59 Years)  Aged Out    HEPATITIS B VACCINES  Aged [employer] History   Administered Date(s) Administered     Influenza (IM) Preservative Free 1935    Influenza Split High Dose Preservative Free IM 09/29/2014, 10/14/2015, 10/30/2017    Influenza, high dose seasonal 0 5 mL 10/24/2018    Pneumococcal Conjugate 13-Valent 03/18/2016    Pneumococcal Polysaccharide PPV23 10/24/2018       Trena Mendez DO  [facility]

## 2019-10-21 ENCOUNTER — TELEPHONE (OUTPATIENT)
Dept: INFUSION CENTER | Facility: CLINIC | Age: 84
End: 2019-10-21

## 2019-10-21 NOTE — TELEPHONE ENCOUNTER
Pt called- not feeling well  Unable to make her 10/23 port flush appt   Rescheduled her to 10/30/19 at 2:30pm

## 2019-10-22 ENCOUNTER — OFFICE VISIT (OUTPATIENT)
Dept: PHYSICAL THERAPY | Facility: CLINIC | Age: 84
End: 2019-10-22
Payer: MEDICARE

## 2019-10-22 DIAGNOSIS — I89.0 LYMPHEDEMA: ICD-10-CM

## 2019-10-22 DIAGNOSIS — R60.0 LOCALIZED EDEMA: Primary | ICD-10-CM

## 2019-10-22 PROCEDURE — 97140 MANUAL THERAPY 1/> REGIONS: CPT | Performed by: PHYSICAL THERAPIST

## 2019-10-22 NOTE — PROGRESS NOTES
Daily Note + PT Discharge    Today's date: 10/22/2019  Patient name: Jez Cerrato  : 1935  MRN: [id number]  Referring provider: Kody Morelos DO  Dx:   Encounter Diagnosis     ICD-10-CM    1  Localized edema R60 0    2  Lymphedema I89 0                   Subjective: Pt reports severe PN in B LE, some of the days unable to tolerate maintaining her LE wraps  Agrees to d/c skilled PT this day, plateau in POC       Objective: See treatment diary below  Flowsheet Rows      Most Recent Value   girth measurments   Extremity   Lower extremity   LE Girth Measurments  Forefoot, Ankle Figure 8, Malleoli, M+10cm, M+20cm, M+30cm, M+40cm   Forefoot   L Forefoot Initial girth  24 75 cm   L Forefoot Updated Girth  24 25 cm   L Forefoot Girth Calculation  -0 5 cm   R Forefoot Initial girth  26 2   R Forefoot Updated Girth  24   L Forefoot girth calculation  -2 2   Ankle Figure 8   L Ankle Figure 8 Initial girth  56 cm   L Ankle Figure 8 Updated Girth  59 5 cm   L Ankle Figure 8 Girth Calculation  3 5 cm   R Ankle Figure 8 Initial girth  56 cm   R Ankle Figure 8 Updated Girth  57 cm   R Ankle Figure 8 Girth Calculation  1 cm   Malleoli   L Malleoli Initial girth  26 cm   L Malleoli Updated girth  29 cm   L Malleoli Girth Calculation  3 cm   R Malleoli Initial girth  28 cm   R Malleoli Updated girth  25 75 cm   R Malleoli Girth Calculation  -2 25 cm   M+10cm    L M+10cm Initial girth  32 5 cm   L M+10cm Updated girth  24 cm   L M+10cm Girth Calculation  -8 5 cm   R M+10cm Initial girth  27 25 cm   R M+10cm Updated girth  23 5 cm   R M+10cm Girth Calculation  -3 75 cm   M+20cm    L M+20cm Initial girth  44 25 cm   L M+20cm Updated girth  33 75 cm   L M+20cm Girth Calculation  -10 5 cm   R M+20cm Initial girth  41 25 cm   R M+20cm Updated girth  31 5 cm   R M+20cm Girth Calculation  -9 75 cm   M+30cm    L M+30cm Initial girth  41 cm   L M+30cm Updated girth  37 cm   L M+30cm Girth Calculation  -4 cm   R M+30cm Initial girth 39 5 cm   R M+30cm Updated girth  36 75 cm   R M+30cm Girth Calculation  -2 75 cm   M+40cm    L M+40cm Initial girth  48 cm   L M+40cm Updated girth  44 5 cm   L M+40cm Girth Calculation  -3 5 cm   R M+40cm Initial girth  47 cm   R M+40cm Updated girth  41 5 cm   R M+40cm Girth Calculation  -5 5 cm          Assessment: Tolerated treatment fair  Pt able to maintain don/doffing of wraps by  along w/ daily hydration from lotion application and regular skin checks  Now d/c skilled PT, plateau in POC w/ plan for 3 mo f/u unless sx's worsen  Goals  ST  Reduce PN <4/10 w/ all activity within 3 wks - Not met  2  Reduce B LE girth measurements >1 cm within 3 wks -MET  LT  Maintain B LE solaris ready wraps daily within 5 wks -MET  2  Reduce PN <2/10 w/ all activity within 5 wks  -Not met  3  Able to apply B LE lotion daily and self MLD techniques as needed within 5 wks -MET      Plan: D/c skilled PT, 3 mo f/u scheduled  Precautions: WC bound, requires Max A to transfer, cant carmelita supine, SCI  B LE lymphedema       Daily Treatment Diary    Manual  9-5 9-9 9-16 9-19 9-23 9-26 9-30 10-22     B LE solaris ready wrap donning + instruction [facility]     MLD B LE - modified tx [facility]                                x50' x50' x50' x50' x45' x45' x45' x40'         Exercise Diary                                                                                                                                                                                                                                                                                      Modalities

## 2019-10-23 ENCOUNTER — HOSPITAL ENCOUNTER (OUTPATIENT)
Dept: INFUSION CENTER | Facility: CLINIC | Age: 84
End: 2019-10-23

## 2019-12-13 ENCOUNTER — TELEPHONE (OUTPATIENT)
Dept: FAMILY MEDICINE CLINIC | Facility: CLINIC | Age: 84
End: 2019-12-13

## 2019-12-13 DIAGNOSIS — R53.83 FATIGUE, UNSPECIFIED TYPE: Primary | ICD-10-CM

## 2019-12-13 DIAGNOSIS — D63.8 ANEMIA OF CHRONIC DISEASE: ICD-10-CM

## 2019-12-13 NOTE — TELEPHONE ENCOUNTER
Patient called c/o weakness  She thinks her blood count may be low  Requesting lab orders  Let me know when placed and I will contact her

## 2019-12-29 DIAGNOSIS — M19.90 ARTHRITIS: ICD-10-CM

## 2019-12-30 RX ORDER — CELECOXIB 200 MG/1
CAPSULE ORAL
Qty: 60 CAPSULE | Refills: 2 | Status: SHIPPED | OUTPATIENT
Start: 2019-12-30

## 2020-01-09 ENCOUNTER — OFFICE VISIT (OUTPATIENT)
Dept: PHYSICAL THERAPY | Facility: CLINIC | Age: 85
End: 2020-01-09
Payer: MEDICARE

## 2020-01-09 DIAGNOSIS — I89.0 LYMPHEDEMA: Primary | ICD-10-CM

## 2020-01-09 PROCEDURE — 97163 PT EVAL HIGH COMPLEX 45 MIN: CPT | Performed by: PHYSICAL THERAPIST

## 2020-01-09 NOTE — PROGRESS NOTES
PT Evaluation + PT Discharge    Today's date: 2020  Patient name: Wilda Spatz  : 1935  MRN: [id number]  Referring provider: Nana Kanner, DO  Dx:   Encounter Diagnosis     ICD-10-CM    1  Lymphedema I89 0                   Subjective: Pt reports managing B LE wraps on legs, feels overall the edema is staying under control, still experiencing PN related to her spine issues and remains wheelchair bound  Attempts to elevate legs as frequently as possible        Objective: See treatment diary below     Flowsheet Rows      Most Recent Value   girth measurments   Extremity   Lower extremity   LE Girth Measurments  Forefoot, Ankle Figure 8, Malleoli, M+10cm, M+20cm, M+30cm, M+40cm   Forefoot   L Forefoot Initial girth  24 75 cm   L Forefoot Updated Girth  24 75 cm   L Forefoot Girth Calculation  0 cm   R Forefoot Initial girth  26 2   R Forefoot Updated Girth  25 25   L Forefoot girth calculation  -0 95   Ankle Figure 8   L Ankle Figure 8 Initial girth  56 cm   L Ankle Figure 8 Updated Girth  58 cm   L Ankle Figure 8 Girth Calculation  2 cm   R Ankle Figure 8 Initial girth  56 cm   R Ankle Figure 8 Updated Girth  56 5 cm   R Ankle Figure 8 Girth Calculation  0 5 cm   Malleoli   L Malleoli Initial girth  26 cm   L Malleoli Updated girth  29 75 cm   L Malleoli Girth Calculation  3 75 cm   R Malleoli Initial girth  28 cm   R Malleoli Updated girth  25 25 cm   R Malleoli Girth Calculation  -2 75 cm   M+10cm    L M+10cm Initial girth  32 5 cm   L M+10cm Updated girth  24 25 cm   L M+10cm Girth Calculation  -8 25 cm   R M+10cm Initial girth  27 25 cm   R M+10cm Updated girth  23 25 cm   R M+10cm Girth Calculation  -4 cm   M+20cm    L M+20cm Initial girth  44 25 cm   L M+20cm Updated girth  33 25 cm   L M+20cm Girth Calculation  -11 cm   R M+20cm Initial girth  41 25 cm   R M+20cm Updated girth  31 9 cm   R M+20cm Girth Calculation  -9 35 cm   M+30cm    L M+30cm Initial girth  41 cm   L M+30cm Updated girth  37 25 cm   L M+30cm Girth Calculation  -3 75 cm   R M+30cm Initial girth  39 5 cm   R M+30cm Updated girth  36 75 cm   R M+30cm Girth Calculation  -2 75 cm   M+40cm    L M+40cm Initial girth  48 cm   L M+40cm Updated girth  44 5 cm   L M+40cm Girth Calculation  -3 5 cm   R M+40cm Initial girth  47 cm   R M+40cm Updated girth  43 75 cm   R M+40cm Girth Calculation  -3 25 cm            Assessment: Pt demonstrates good management of B LE w/ cont'd use of wraps and lotion to maintain adequate skin health w/ minimal scaling, still moderate to severe fibrosis B but no presence of blisters as previously seen  Suitable to maintain phase 2 CDT protocol w/ 3 mo f/u in  to RA  Goals  ST  Reduce PN <4/10 w/ all activity within 3 wks - Not met  2  Reduce B LE girth measurements >1 cm within 3 wks -MET  LT  Maintain B LE solaris ready wraps daily within 5 wks -MET  2  Reduce PN <2/10 w/ all activity within 5 wks  -Not met  3  Able to apply B LE lotion daily and self MLD techniques as needed within 5 wks -MET      Plan: D/c skilled PT, 3 mo f/u  POC date range: 2020 thru 1-, 1 visit  Precautions: WC bound, requires Max A to transfer, cant carmelita supine, SCI  B LE lymphedema       Daily Treatment Diary    Manual  1-9            B LE solaris ready wrap donning + instruction jph + lotion            MLD B LE - modified tx                                                        Exercise Diary                                                                                                                                                                                                                                                                                      Modalities

## 2020-02-08 DIAGNOSIS — R60.9 EDEMA, UNSPECIFIED TYPE: ICD-10-CM

## 2020-02-10 ENCOUNTER — TELEPHONE (OUTPATIENT)
Dept: FAMILY MEDICINE CLINIC | Facility: CLINIC | Age: 85
End: 2020-02-10

## 2020-02-10 DIAGNOSIS — F32.A DEPRESSION, UNSPECIFIED DEPRESSION TYPE: Primary | ICD-10-CM

## 2020-02-10 RX ORDER — FUROSEMIDE 40 MG/1
TABLET ORAL
Qty: 180 TABLET | Refills: 0 | Status: SHIPPED | OUTPATIENT
Start: 2020-02-10

## 2020-02-10 RX ORDER — ESCITALOPRAM OXALATE 5 MG/1
5 TABLET ORAL DAILY
Qty: 30 TABLET | Refills: 5 | Status: SHIPPED | OUTPATIENT
Start: 2020-02-10

## 2020-02-11 ENCOUNTER — TELEPHONE (OUTPATIENT)
Dept: FAMILY MEDICINE CLINIC | Facility: CLINIC | Age: 85
End: 2020-02-11

## 2020-02-11 DIAGNOSIS — G89.4 PAIN SYNDROME, CHRONIC: Primary | ICD-10-CM

## 2020-02-11 NOTE — TELEPHONE ENCOUNTER
Voicemail from patient requesting refill for Nucynta 50 mg BID  This medication has been discontinued on her medication list   Should she still be on this? Per PDMP last refill was 9/5/19  Please advise  Requesting [facility] in [place]  Last OV 10/17/19  No future appts

## 2020-02-12 ENCOUNTER — TELEPHONE (OUTPATIENT)
Dept: FAMILY MEDICINE CLINIC | Facility: CLINIC | Age: 85
End: 2020-02-12

## 2020-02-18 ENCOUNTER — TELEPHONE (OUTPATIENT)
Dept: INFUSION CENTER | Facility: CLINIC | Age: 85
End: 2020-02-18

## 2020-02-18 NOTE — TELEPHONE ENCOUNTER
Pt called  She will be unable to make her port flush appt tomorrow (2/19)   Rescheduled to Thursday 2/20 at 230pm at her request

## 2020-02-19 ENCOUNTER — HOSPITAL ENCOUNTER (OUTPATIENT)
Dept: INFUSION CENTER | Facility: CLINIC | Age: 85
End: 2020-02-19

## 2020-03-04 ENCOUNTER — HOSPITAL ENCOUNTER (OUTPATIENT)
Dept: INFUSION CENTER | Facility: CLINIC | Age: 85
Discharge: HOME | End: 2020-03-04
Payer: MEDICARE

## 2020-03-04 PROCEDURE — 96523 IRRIG DRUG DELIVERY DEVICE: CPT

## 2020-03-04 NOTE — PROGRESS NOTES
Pt here for port flush  Port flushed per protocol    Pt was provided with future appt in 10 weeks per her request

## 2020-03-13 ENCOUNTER — TELEPHONE (OUTPATIENT)
Dept: FAMILY MEDICINE CLINIC | Facility: CLINIC | Age: 85
End: 2020-03-13

## 2020-03-13 NOTE — TELEPHONE ENCOUNTER
Pt called back, gave Dr Naya García name and previous fax #  Have you referred to pain specialist for medical marijuana?

## 2020-03-13 NOTE — TELEPHONE ENCOUNTER
Pt called stating she had mentioned/discussed months ago about treating pain w/ possibly medical yojana  Stated we had referred her to see [facility]  Stated she needs medical records w/ diagnosis faxed to [phone]  [facility] asking [place] to return call to office  Wanted to verify doctors name, location, phone, and fax   Also see if the doctor can fax records request

## 2020-03-19 NOTE — TELEPHONE ENCOUNTER
Notified pt that she needs to come in and fill out med release form for us to send records to Dr Carolynn Ling office  Pt stated she will most likely have her   the form and take it home, she will fill it out and he will bring it back

## 2020-04-15 ENCOUNTER — APPOINTMENT (OUTPATIENT)
Dept: LAB | Facility: CLINIC | Age: 85
End: 2020-04-15
Payer: MEDICARE

## 2020-04-15 ENCOUNTER — TRANSCRIBE ORDERS (OUTPATIENT)
Dept: INFUSION CENTER | Facility: CLINIC | Age: 85
End: 2020-04-15

## 2020-04-15 ENCOUNTER — TELEPHONE (OUTPATIENT)
Dept: FAMILY MEDICINE CLINIC | Facility: CLINIC | Age: 85
End: 2020-04-15

## 2020-04-15 ENCOUNTER — HOSPITAL ENCOUNTER (OUTPATIENT)
Dept: INFUSION CENTER | Facility: CLINIC | Age: 85
Discharge: HOME | End: 2020-04-15

## 2020-04-15 VITALS — TEMPERATURE: 98.6 F

## 2020-04-15 DIAGNOSIS — K92.1 BLOOD IN STOOL: ICD-10-CM

## 2020-04-15 DIAGNOSIS — K92.1 BLOOD IN STOOL: Primary | ICD-10-CM

## 2020-04-15 LAB
ALBUMIN SERPL BCP-MCNC: 3.6 G/DL (ref 3.5–5)
ALP SERPL-CCNC: 68 U/L (ref 46–116)
ALT SERPL W P-5'-P-CCNC: 22 U/L (ref 12–78)
ANION GAP SERPL CALCULATED.3IONS-SCNC: 6 MMOL/L (ref 4–13)
AST SERPL W P-5'-P-CCNC: 12 U/L (ref 5–45)
BASOPHILS # BLD AUTO: 0.01 THOUSANDS/ΜL (ref 0–0.1)
BASOPHILS NFR BLD AUTO: 0 % (ref 0–1)
BILIRUB SERPL-MCNC: 0.38 MG/DL (ref 0.2–1)
BUN SERPL-MCNC: 17 MG/DL (ref 5–25)
CALCIUM SERPL-MCNC: 9.7 MG/DL (ref 8.3–10.1)
CHLORIDE SERPL-SCNC: 105 MMOL/L (ref 100–108)
CO2 SERPL-SCNC: 31 MMOL/L (ref 21–32)
CREAT SERPL-MCNC: 0.61 MG/DL (ref 0.6–1.3)
EOSINOPHIL # BLD AUTO: 0.22 THOUSAND/ΜL (ref 0–0.61)
EOSINOPHIL NFR BLD AUTO: 4 % (ref 0–6)
ERYTHROCYTE [DISTWIDTH] IN BLOOD BY AUTOMATED COUNT: 14.6 % (ref 11.6–15.1)
FERRITIN SERPL-MCNC: 111 NG/ML (ref 8–388)
GFR SERPL CREATININE-BSD FRML MDRD: 84 ML/MIN/1.73SQ M
GLUCOSE SERPL-MCNC: 106 MG/DL (ref 65–140)
HCT VFR BLD AUTO: 36.2 % (ref 34.8–46.1)
HGB BLD-MCNC: 11.3 G/DL (ref 11.5–15.4)
IMM GRANULOCYTES # BLD AUTO: 0.02 THOUSAND/UL (ref 0–0.2)
IMM GRANULOCYTES NFR BLD AUTO: 0 % (ref 0–2)
INR PPP: 0.98 (ref 0.84–1.19)
IRON SATN MFR SERPL: 20 %
IRON SERPL-MCNC: 53 UG/DL (ref 50–170)
LYMPHOCYTES # BLD AUTO: 1.02 THOUSANDS/ΜL (ref 0.6–4.47)
LYMPHOCYTES NFR BLD AUTO: 18 % (ref 14–44)
MCH RBC QN AUTO: 30.5 PG (ref 26.8–34.3)
MCHC RBC AUTO-ENTMCNC: 31.2 G/DL (ref 31.4–37.4)
MCV RBC AUTO: 98 FL (ref 82–98)
MONOCYTES # BLD AUTO: 0.44 THOUSAND/ΜL (ref 0.17–1.22)
MONOCYTES NFR BLD AUTO: 8 % (ref 4–12)
NEUTROPHILS # BLD AUTO: 3.99 THOUSANDS/ΜL (ref 1.85–7.62)
NEUTS SEG NFR BLD AUTO: 70 % (ref 43–75)
NRBC BLD AUTO-RTO: 0 /100 WBCS
PLATELET # BLD AUTO: 245 THOUSANDS/UL (ref 149–390)
PMV BLD AUTO: 10.7 FL (ref 8.9–12.7)
POTASSIUM SERPL-SCNC: 4.3 MMOL/L (ref 3.5–5.3)
PROT SERPL-MCNC: 7.8 G/DL (ref 6.4–8.2)
PROTHROMBIN TIME: 12.6 SECONDS (ref 11.6–14.5)
RBC # BLD AUTO: 3.7 MILLION/UL (ref 3.81–5.12)
SODIUM SERPL-SCNC: 142 MMOL/L (ref 136–145)
TIBC SERPL-MCNC: 260 UG/DL (ref 250–450)
WBC # BLD AUTO: 5.7 THOUSAND/UL (ref 4.31–10.16)

## 2020-04-15 PROCEDURE — 85610 PROTHROMBIN TIME: CPT

## 2020-04-15 PROCEDURE — 83540 ASSAY OF IRON: CPT

## 2020-04-15 PROCEDURE — 85025 COMPLETE CBC W/AUTO DIFF WBC: CPT

## 2020-04-15 PROCEDURE — 80053 COMPREHEN METABOLIC PANEL: CPT

## 2020-04-15 PROCEDURE — 83550 IRON BINDING TEST: CPT

## 2020-04-15 PROCEDURE — 82728 ASSAY OF FERRITIN: CPT

## 2020-04-15 PROCEDURE — 36415 COLL VENOUS BLD VENIPUNCTURE: CPT

## 2020-04-21 ENCOUNTER — TELEMEDICINE (OUTPATIENT)
Dept: FAMILY MEDICINE CLINIC | Facility: CLINIC | Age: 85
End: 2020-04-21
Payer: MEDICARE

## 2020-04-21 DIAGNOSIS — D63.8 ANEMIA OF CHRONIC DISEASE: ICD-10-CM

## 2020-04-21 DIAGNOSIS — G89.4 PAIN SYNDROME, CHRONIC: Primary | ICD-10-CM

## 2020-04-21 DIAGNOSIS — K50.919 CROHN'S DISEASE WITH COMPLICATION, UNSPECIFIED GASTROINTESTINAL TRACT LOCATION (HCC): ICD-10-CM

## 2020-04-21 PROCEDURE — 99442 PR PHYS/QHP TELEPHONE EVALUATION 11-20 MIN: CPT | Performed by: FAMILY MEDICINE

## 2020-04-21 RX ORDER — VALACYCLOVIR HYDROCHLORIDE 500 MG/1
500 TABLET, FILM COATED ORAL DAILY
COMMUNITY
Start: 2020-03-02

## 2020-04-21 RX ORDER — OMEPRAZOLE 40 MG/1
CAPSULE, DELAYED RELEASE ORAL
COMMUNITY
Start: 2020-04-15

## 2020-04-23 ENCOUNTER — TELEPHONE (OUTPATIENT)
Dept: FAMILY MEDICINE CLINIC | Facility: CLINIC | Age: 85
End: 2020-04-23

## 2020-04-23 DIAGNOSIS — G89.4 PAIN SYNDROME, CHRONIC: Primary | ICD-10-CM

## 2020-05-02 ENCOUNTER — APPOINTMENT (EMERGENCY)
Dept: CT IMAGING | Facility: HOSPITAL | Age: 85
End: 2020-05-02
Payer: MEDICARE

## 2020-05-02 ENCOUNTER — HOSPITAL ENCOUNTER (EMERGENCY)
Facility: HOSPITAL | Age: 85
Discharge: LEFT BEFORE DISCHARGE | End: 2020-05-02
Attending: EMERGENCY MEDICINE | Admitting: EMERGENCY MEDICINE
Payer: MEDICARE

## 2020-05-02 ENCOUNTER — TELEPHONE (OUTPATIENT)
Dept: FAMILY MEDICINE CLINIC | Facility: CLINIC | Age: 85
End: 2020-05-02

## 2020-05-02 VITALS
BODY MASS INDEX: 26.19 KG/M2 | RESPIRATION RATE: 18 BRPM | DIASTOLIC BLOOD PRESSURE: 88 MMHG | TEMPERATURE: 98.6 F | OXYGEN SATURATION: 98 % | SYSTOLIC BLOOD PRESSURE: 194 MMHG | HEART RATE: 84 BPM | WEIGHT: 152.56 LBS

## 2020-05-02 DIAGNOSIS — R19.7 DIARRHEA: ICD-10-CM

## 2020-05-02 DIAGNOSIS — R52 GENERALIZED BODY ACHES: ICD-10-CM

## 2020-05-02 DIAGNOSIS — R14.0 ABDOMINAL DISTENTION: Primary | ICD-10-CM

## 2020-05-02 LAB
ALBUMIN SERPL BCP-MCNC: 3.7 G/DL (ref 3.5–5)
ALBUMIN SERPL BCP-MCNC: 4 G/DL (ref 3.5–5)
ALP SERPL-CCNC: 63 U/L (ref 46–116)
ALP SERPL-CCNC: 69 U/L (ref 46–116)
ALT SERPL W P-5'-P-CCNC: 23 U/L (ref 12–78)
ALT SERPL W P-5'-P-CCNC: 23 U/L (ref 12–78)
ANION GAP SERPL CALCULATED.3IONS-SCNC: 12 MMOL/L (ref 4–13)
ANION GAP SERPL CALCULATED.3IONS-SCNC: 9 MMOL/L (ref 4–13)
AST SERPL W P-5'-P-CCNC: 19 U/L (ref 5–45)
AST SERPL W P-5'-P-CCNC: 87 U/L (ref 5–45)
BASOPHILS # BLD AUTO: 0.01 THOUSANDS/ΜL (ref 0–0.1)
BASOPHILS NFR BLD AUTO: 0 % (ref 0–1)
BILIRUB SERPL-MCNC: 0.54 MG/DL (ref 0.2–1)
BILIRUB SERPL-MCNC: 0.69 MG/DL (ref 0.2–1)
BUN SERPL-MCNC: 12 MG/DL (ref 5–25)
BUN SERPL-MCNC: 12 MG/DL (ref 5–25)
CALCIUM SERPL-MCNC: 9.8 MG/DL (ref 8.3–10.1)
CALCIUM SERPL-MCNC: 9.8 MG/DL (ref 8.3–10.1)
CHLORIDE SERPL-SCNC: 100 MMOL/L (ref 100–108)
CHLORIDE SERPL-SCNC: 102 MMOL/L (ref 100–108)
CO2 SERPL-SCNC: 28 MMOL/L (ref 21–32)
CO2 SERPL-SCNC: 28 MMOL/L (ref 21–32)
CREAT SERPL-MCNC: 0.66 MG/DL (ref 0.6–1.3)
CREAT SERPL-MCNC: 0.67 MG/DL (ref 0.6–1.3)
EOSINOPHIL # BLD AUTO: 0.11 THOUSAND/ΜL (ref 0–0.61)
EOSINOPHIL NFR BLD AUTO: 2 % (ref 0–6)
ERYTHROCYTE [DISTWIDTH] IN BLOOD BY AUTOMATED COUNT: 13.9 % (ref 11.6–15.1)
GFR SERPL CREATININE-BSD FRML MDRD: 81 ML/MIN/1.73SQ M
GFR SERPL CREATININE-BSD FRML MDRD: 81 ML/MIN/1.73SQ M
GLUCOSE SERPL-MCNC: 107 MG/DL (ref 65–140)
GLUCOSE SERPL-MCNC: 110 MG/DL (ref 65–140)
HCT VFR BLD AUTO: 38.4 % (ref 34.8–46.1)
HGB BLD-MCNC: 12.3 G/DL (ref 11.5–15.4)
IMM GRANULOCYTES # BLD AUTO: 0.01 THOUSAND/UL (ref 0–0.2)
IMM GRANULOCYTES NFR BLD AUTO: 0 % (ref 0–2)
LIPASE SERPL-CCNC: 63 U/L (ref 73–393)
LYMPHOCYTES # BLD AUTO: 0.79 THOUSANDS/ΜL (ref 0.6–4.47)
LYMPHOCYTES NFR BLD AUTO: 14 % (ref 14–44)
MCH RBC QN AUTO: 31.1 PG (ref 26.8–34.3)
MCHC RBC AUTO-ENTMCNC: 32 G/DL (ref 31.4–37.4)
MCV RBC AUTO: 97 FL (ref 82–98)
MONOCYTES # BLD AUTO: 0.33 THOUSAND/ΜL (ref 0.17–1.22)
MONOCYTES NFR BLD AUTO: 6 % (ref 4–12)
NEUTROPHILS # BLD AUTO: 4.59 THOUSANDS/ΜL (ref 1.85–7.62)
NEUTS SEG NFR BLD AUTO: 78 % (ref 43–75)
NRBC BLD AUTO-RTO: 0 /100 WBCS
PLATELET # BLD AUTO: 249 THOUSANDS/UL (ref 149–390)
PMV BLD AUTO: 10.1 FL (ref 8.9–12.7)
POTASSIUM SERPL-SCNC: 3.7 MMOL/L (ref 3.5–5.3)
POTASSIUM SERPL-SCNC: 6.4 MMOL/L (ref 3.5–5.3)
PROT SERPL-MCNC: 8.4 G/DL (ref 6.4–8.2)
PROT SERPL-MCNC: 8.8 G/DL (ref 6.4–8.2)
RBC # BLD AUTO: 3.96 MILLION/UL (ref 3.81–5.12)
SODIUM SERPL-SCNC: 137 MMOL/L (ref 136–145)
SODIUM SERPL-SCNC: 142 MMOL/L (ref 136–145)
WBC # BLD AUTO: 5.84 THOUSAND/UL (ref 4.31–10.16)

## 2020-05-02 PROCEDURE — 85025 COMPLETE CBC W/AUTO DIFF WBC: CPT | Performed by: PHYSICIAN ASSISTANT

## 2020-05-02 PROCEDURE — 99284 EMERGENCY DEPT VISIT MOD MDM: CPT | Performed by: PHYSICIAN ASSISTANT

## 2020-05-02 PROCEDURE — 99284 EMERGENCY DEPT VISIT MOD MDM: CPT

## 2020-05-02 PROCEDURE — 80053 COMPREHEN METABOLIC PANEL: CPT | Performed by: PHYSICIAN ASSISTANT

## 2020-05-02 PROCEDURE — 96374 THER/PROPH/DIAG INJ IV PUSH: CPT

## 2020-05-02 PROCEDURE — 96361 HYDRATE IV INFUSION ADD-ON: CPT

## 2020-05-02 PROCEDURE — 36415 COLL VENOUS BLD VENIPUNCTURE: CPT | Performed by: PHYSICIAN ASSISTANT

## 2020-05-02 PROCEDURE — 83690 ASSAY OF LIPASE: CPT | Performed by: PHYSICIAN ASSISTANT

## 2020-05-02 RX ADMIN — MORPHINE SULFATE 2 MG: 2 INJECTION, SOLUTION INTRAMUSCULAR; INTRAVENOUS at 19:15

## 2020-05-02 RX ADMIN — SODIUM CHLORIDE 1000 ML: 0.9 INJECTION, SOLUTION INTRAVENOUS at 19:15

## 2020-05-13 ENCOUNTER — HOSPITAL ENCOUNTER (OUTPATIENT)
Dept: INFUSION CENTER | Facility: CLINIC | Age: 85
End: 2020-05-13

## 2020-05-27 ENCOUNTER — HOSPITAL ENCOUNTER (OUTPATIENT)
Dept: INFUSION CENTER | Facility: CLINIC | Age: 85
Discharge: HOME | End: 2020-05-27
Payer: MEDICARE

## 2020-05-27 PROCEDURE — 96523 IRRIG DRUG DELIVERY DEVICE: CPT

## 2020-05-30 ENCOUNTER — NURSE TRIAGE (OUTPATIENT)
Dept: OTHER | Facility: OTHER | Age: 85
End: 2020-05-30

## 2020-05-30 ENCOUNTER — OFFICE VISIT (OUTPATIENT)
Dept: URGENT CARE | Facility: MEDICAL CENTER | Age: 85
End: 2020-05-30
Payer: MEDICARE

## 2020-05-30 VITALS
RESPIRATION RATE: 16 BRPM | SYSTOLIC BLOOD PRESSURE: 150 MMHG | DIASTOLIC BLOOD PRESSURE: 65 MMHG | HEART RATE: 71 BPM | TEMPERATURE: 97.5 F | OXYGEN SATURATION: 97 %

## 2020-05-30 DIAGNOSIS — L03.116 CELLULITIS OF LEFT LOWER EXTREMITY: Primary | ICD-10-CM

## 2020-05-30 DIAGNOSIS — L03.115 CELLULITIS OF RIGHT LOWER EXTREMITY: ICD-10-CM

## 2020-05-30 PROCEDURE — G0463 HOSPITAL OUTPT CLINIC VISIT: HCPCS | Performed by: PHYSICIAN ASSISTANT

## 2020-05-30 PROCEDURE — 99213 OFFICE O/P EST LOW 20 MIN: CPT | Performed by: PHYSICIAN ASSISTANT

## 2020-05-30 RX ORDER — SULFAMETHOXAZOLE AND TRIMETHOPRIM 800; 160 MG/1; MG/1
1 TABLET ORAL EVERY 12 HOURS SCHEDULED
Qty: 14 TABLET | Refills: 0 | Status: SHIPPED | OUTPATIENT
Start: 2020-05-30 | End: 2020-06-02 | Stop reason: SINTOL

## 2020-06-01 ENCOUNTER — OFFICE VISIT (OUTPATIENT)
Dept: PHYSICAL THERAPY | Facility: CLINIC | Age: 85
End: 2020-06-01
Payer: MEDICARE

## 2020-06-01 DIAGNOSIS — I89.0 LYMPHEDEMA: Primary | ICD-10-CM

## 2020-06-01 PROCEDURE — 97163 PT EVAL HIGH COMPLEX 45 MIN: CPT | Performed by: PHYSICAL THERAPIST

## 2020-06-02 ENCOUNTER — TELEPHONE (OUTPATIENT)
Dept: FAMILY MEDICINE CLINIC | Facility: CLINIC | Age: 85
End: 2020-06-02

## 2020-06-02 DIAGNOSIS — L03.119 CELLULITIS OF LOWER EXTREMITY, UNSPECIFIED LATERALITY: Primary | ICD-10-CM

## 2020-06-02 RX ORDER — DOXYCYCLINE 100 MG/1
100 TABLET ORAL 2 TIMES DAILY
Qty: 20 TABLET | Refills: 0 | Status: SHIPPED | OUTPATIENT
Start: 2020-06-02 | End: 2020-06-12

## 2020-06-08 ENCOUNTER — OFFICE VISIT (OUTPATIENT)
Dept: PHYSICAL THERAPY | Facility: CLINIC | Age: 85
End: 2020-06-08
Payer: MEDICARE

## 2020-06-08 DIAGNOSIS — I89.0 LYMPHEDEMA: Primary | ICD-10-CM

## 2020-06-08 PROCEDURE — 97140 MANUAL THERAPY 1/> REGIONS: CPT | Performed by: PHYSICAL THERAPIST

## 2020-06-11 ENCOUNTER — OFFICE VISIT (OUTPATIENT)
Dept: PHYSICAL THERAPY | Facility: CLINIC | Age: 85
End: 2020-06-11
Payer: MEDICARE

## 2020-06-11 DIAGNOSIS — I89.0 LYMPHEDEMA: Primary | ICD-10-CM

## 2020-06-11 PROCEDURE — 97140 MANUAL THERAPY 1/> REGIONS: CPT | Performed by: PHYSICAL THERAPIST

## 2020-06-17 ENCOUNTER — OFFICE VISIT (OUTPATIENT)
Dept: PHYSICAL THERAPY | Facility: CLINIC | Age: 85
End: 2020-06-17
Payer: MEDICARE

## 2020-06-17 DIAGNOSIS — I89.0 LYMPHEDEMA: Primary | ICD-10-CM

## 2020-06-17 PROCEDURE — 97140 MANUAL THERAPY 1/> REGIONS: CPT | Performed by: PHYSICAL THERAPIST

## 2020-06-22 ENCOUNTER — OFFICE VISIT (OUTPATIENT)
Dept: PHYSICAL THERAPY | Facility: CLINIC | Age: 85
End: 2020-06-22
Payer: MEDICARE

## 2020-06-22 DIAGNOSIS — I89.0 LYMPHEDEMA: Primary | ICD-10-CM

## 2020-06-22 PROCEDURE — 97140 MANUAL THERAPY 1/> REGIONS: CPT | Performed by: PHYSICAL THERAPIST

## 2020-06-25 ENCOUNTER — OFFICE VISIT (OUTPATIENT)
Dept: PHYSICAL THERAPY | Facility: CLINIC | Age: 85
End: 2020-06-25
Payer: MEDICARE

## 2020-06-25 DIAGNOSIS — I89.0 LYMPHEDEMA: Primary | ICD-10-CM

## 2020-06-25 PROCEDURE — 97140 MANUAL THERAPY 1/> REGIONS: CPT | Performed by: PHYSICAL THERAPIST

## 2020-06-29 ENCOUNTER — OFFICE VISIT (OUTPATIENT)
Dept: PHYSICAL THERAPY | Facility: CLINIC | Age: 85
End: 2020-06-29
Payer: MEDICARE

## 2020-06-29 DIAGNOSIS — I89.0 LYMPHEDEMA: Primary | ICD-10-CM

## 2020-06-29 PROCEDURE — 97140 MANUAL THERAPY 1/> REGIONS: CPT | Performed by: PHYSICAL THERAPIST

## 2020-07-02 ENCOUNTER — OFFICE VISIT (OUTPATIENT)
Dept: PHYSICAL THERAPY | Facility: CLINIC | Age: 85
End: 2020-07-02
Payer: MEDICARE

## 2020-07-02 DIAGNOSIS — I89.0 LYMPHEDEMA: Primary | ICD-10-CM

## 2020-07-02 PROCEDURE — 97140 MANUAL THERAPY 1/> REGIONS: CPT | Performed by: PHYSICAL THERAPIST

## 2020-07-02 NOTE — PROGRESS NOTES
Daily Note     Today's date: 2020  Patient name: Marco Antonio Ritchie  : 1935  MRN: [id number]  Referring provider: Salome Tian DO  Dx:   Encounter Diagnosis     ICD-10-CM    1  Lymphedema I89 0                   Subjective:  Presents stating the legs are fair this day, still some PN, but manageable  Objective: See treatment diary below      Assessment: Tolerated treatment well  Patient would benefit from continued PT   L >R edema noted this day w/ heaviness in L foot making it a challengeto fot L foot/slipper  R LE reduced well w/ better skin health also noted  Plan: Continue per plan of care  Precautions: WC bound spinal pathology         Manuals 6-8 6-11 6-17 6-22 6- 6-29 7-2      Modified MLD B LE jph jph jph jph jph jph jph      B LE ready wraps jph jph jph jph jph jph jph                                Neuro Re-Ed                                                                                                        Ther Ex                                                                                                                     Ther Activity                                       Gait Training                                       Modalities

## 2020-07-16 ENCOUNTER — OFFICE VISIT (OUTPATIENT)
Dept: PHYSICAL THERAPY | Facility: CLINIC | Age: 85
End: 2020-07-16
Payer: MEDICARE

## 2020-07-16 DIAGNOSIS — I89.0 LYMPHEDEMA: Primary | ICD-10-CM

## 2020-07-16 PROCEDURE — 97140 MANUAL THERAPY 1/> REGIONS: CPT | Performed by: PHYSICAL THERAPIST

## 2020-07-16 NOTE — PROGRESS NOTES
Daily Note + PT Discharge    Today's date: 2020  Patient name: Mj Briceño  : 1935  MRN: [id number]  Referring provider: Musa Ward DO  Dx:   Encounter Diagnosis     ICD-10-CM    1  Lymphedema I89 0                   Subjective:  Presents reporting she is managing legs as well as she can, still has a fair amount of PN that fluctuates but overall the edema is down and she feels her and her  can manage her wraps  Agrees to d/c this day  Objective: See treatment diary below  Goals  ST  Pt to contact w/ PCP regarding need for antibiotic and diarrhea control within 1-2 days-MET  2  Pt to maintain antibiotic Rx as directed within 1-2 days-MET  3  Initiate MLD B LE once appropriate and infection risk minimized within 1 wk-MET  LT  Reduce B LE PN <5/10 w/ all activity within 6 wks-Not met  2  Reduce B LE limb volume >10% within 6 wks-MET  3  Reduce B LE lower leg fibrosis from severe to moderate within 6 wks-MET  4  D/c to self management, phase 2 CDT protocol within 8 wks-MET    Assessment: Improved skin mobility, reduced vol B LE, and improved skin health w/ sig reduction in B LE erythema  D/c skilled PT to phase 2 self management  Plan: F/u in 3 months, Oct '20, maintain self management  Precautions: WC bound spinal pathology         Manuals 6-8 6-11 6-17 6-22 6-25 6-29 7-2 7-16     Modified MLD B LE jph jph jph jph jph jph jph jp     B LE ready wraps jph jph jph jph jph jph jph jp                               Neuro Re-Ed                                                                                                        Ther Ex                                                                                                                     Ther Activity                                       Gait Training                                       Modalities

## 2020-07-29 ENCOUNTER — HOSPITAL ENCOUNTER (OUTPATIENT)
Dept: INFUSION CENTER | Facility: CLINIC | Age: 85
Discharge: HOME | End: 2020-07-29

## 2020-08-07 ENCOUNTER — TRANSCRIBE ORDERS (OUTPATIENT)
Dept: ADMINISTRATIVE | Facility: HOSPITAL | Age: 85
End: 2020-08-07

## 2020-08-07 DIAGNOSIS — R19.7 DIARRHEA OF PRESUMED INFECTIOUS ORIGIN: ICD-10-CM

## 2020-08-07 DIAGNOSIS — R19.4 FREQUENT BOWEL MOVEMENTS: ICD-10-CM

## 2020-08-07 DIAGNOSIS — R10.84 ABDOMINAL PAIN, GENERALIZED: Primary | ICD-10-CM

## 2020-08-10 DIAGNOSIS — R11.0 NAUSEA: ICD-10-CM

## 2020-08-10 RX ORDER — ONDANSETRON HYDROCHLORIDE 8 MG/1
8 TABLET, FILM COATED ORAL EVERY 8 HOURS PRN
Qty: 20 TABLET | Refills: 1 | Status: SHIPPED | OUTPATIENT
Start: 2020-08-10

## 2020-08-19 ENCOUNTER — TELEPHONE (OUTPATIENT)
Dept: FAMILY MEDICINE CLINIC | Facility: CLINIC | Age: 85
End: 2020-08-19

## 2020-08-19 NOTE — TELEPHONE ENCOUNTER
Pt  would like to know if you would be able to recommend a new eye doctor for [facility]  Pt would like a Eye specialist for glaucoma due to her having shingles in Left eye in the past  Pt was going to [facility] and they are having a hard time with them  Please advise

## 2020-09-03 ENCOUNTER — TELEPHONE (OUTPATIENT)
Dept: FAMILY MEDICINE CLINIC | Facility: CLINIC | Age: 85
End: 2020-09-03

## 2020-09-09 ENCOUNTER — DOCTOR'S OFFICE (OUTPATIENT)
Dept: URBAN - METROPOLITAN AREA CLINIC 136 | Facility: CLINIC | Age: 85
Setting detail: OPHTHALMOLOGY
End: 2020-09-09
Payer: COMMERCIAL

## 2020-09-09 ENCOUNTER — RX ONLY (RX ONLY)
Age: 85
End: 2020-09-09

## 2020-09-09 DIAGNOSIS — H40.013: ICD-10-CM

## 2020-09-09 DIAGNOSIS — H04.122: ICD-10-CM

## 2020-09-09 DIAGNOSIS — Z96.1: ICD-10-CM

## 2020-09-09 DIAGNOSIS — H35.3131: ICD-10-CM

## 2020-09-09 DIAGNOSIS — H04.121: ICD-10-CM

## 2020-09-09 DIAGNOSIS — B02.39: ICD-10-CM

## 2020-09-09 PROCEDURE — 92133 CPTRZD OPH DX IMG PST SGM ON: CPT | Performed by: OPHTHALMOLOGY

## 2020-09-09 PROCEDURE — 92002 INTRM OPH EXAM NEW PATIENT: CPT | Performed by: OPHTHALMOLOGY

## 2020-09-09 PROCEDURE — 92020 GONIOSCOPY: CPT | Performed by: OPHTHALMOLOGY

## 2020-09-09 ASSESSMENT — CONFRONTATIONAL VISUAL FIELD TEST (CVF)
OS_FINDINGS: FULL
OD_FINDINGS: FULL

## 2020-09-10 ENCOUNTER — HOSPITAL ENCOUNTER (OUTPATIENT)
Dept: INFUSION CENTER | Facility: CLINIC | Age: 85
Discharge: HOME | End: 2020-09-10
Payer: MEDICARE

## 2020-09-10 VITALS — TEMPERATURE: 98.2 F

## 2020-09-10 PROCEDURE — 96523 IRRIG DRUG DELIVERY DEVICE: CPT

## 2020-09-10 NOTE — PLAN OF CARE
Problem: Potential for Falls  Goal: Patient will remain free of falls  Description: INTERVENTIONS:  - Assess patient frequently for physical needs  -  Identify cognitive and physical deficits and behaviors that affect risk of falls    -  [place] fall precautions as indicated by assessment   - Educate patient/family on patient safety including physical limitations  - Instruct patient to call for assistance with activity based on assessment  - Modify environment to reduce risk of injury  - Consider OT/PT consult to assist with strengthening/mobility  Outcome: Progressing

## 2020-09-11 ASSESSMENT — VISUAL ACUITY
OD_BCVA: 20/40+2
OS_BCVA: 20/80-2

## 2020-10-14 ENCOUNTER — DOCTOR'S OFFICE (OUTPATIENT)
Dept: URBAN - METROPOLITAN AREA CLINIC 136 | Facility: CLINIC | Age: 85
Setting detail: OPHTHALMOLOGY
End: 2020-10-14
Payer: COMMERCIAL

## 2020-10-14 DIAGNOSIS — Z96.1: ICD-10-CM

## 2020-10-14 DIAGNOSIS — B02.39: ICD-10-CM

## 2020-10-14 DIAGNOSIS — H40.013: ICD-10-CM

## 2020-10-14 DIAGNOSIS — H35.3131: ICD-10-CM

## 2020-10-14 DIAGNOSIS — H04.121: ICD-10-CM

## 2020-10-14 DIAGNOSIS — H04.122: ICD-10-CM

## 2020-10-14 PROCEDURE — 92014 COMPRE OPH EXAM EST PT 1/>: CPT | Performed by: OPHTHALMOLOGY

## 2020-10-14 PROCEDURE — 92202 OPSCPY EXTND ON/MAC DRAW: CPT | Performed by: OPHTHALMOLOGY

## 2020-10-14 ASSESSMENT — CONFRONTATIONAL VISUAL FIELD TEST (CVF)
OD_FINDINGS: FULL
OS_FINDINGS: FULL

## 2020-10-15 ENCOUNTER — APPOINTMENT (OUTPATIENT)
Dept: RADIOLOGY | Facility: MEDICAL CENTER | Age: 85
End: 2020-10-15
Attending: INTERNAL MEDICINE
Payer: MEDICARE

## 2020-10-15 DIAGNOSIS — K59.00 CONSTIPATION, UNSPECIFIED CONSTIPATION TYPE: ICD-10-CM

## 2020-10-15 PROCEDURE — 74018 RADEX ABDOMEN 1 VIEW: CPT

## 2020-11-05 ENCOUNTER — TELEPHONE (OUTPATIENT)
Dept: FAMILY MEDICINE CLINIC | Facility: CLINIC | Age: 85
End: 2020-11-05

## 2020-11-06 DIAGNOSIS — L89.159 PRESSURE INJURY OF SKIN OF SACRAL REGION, UNSPECIFIED INJURY STAGE: Primary | ICD-10-CM

## 2020-11-08 DIAGNOSIS — L89.159 PRESSURE INJURY OF SKIN OF SACRAL REGION, UNSPECIFIED INJURY STAGE: Primary | ICD-10-CM

## 2020-12-30 ASSESSMENT — VISUAL ACUITY
OS_BCVA: 20/40+1
OD_BCVA: 20/125

## 2020-12-31 PROBLEM — B02.39: Status: ACTIVE | Noted: 2020-09-09

## 2020-12-31 PROBLEM — H43.813 POSTERIOR VITREOUS DETACHMENT; BOTH EYES: Status: ACTIVE | Noted: 2020-10-14

## 2020-12-31 PROBLEM — H40.042 STEROID RESPONDER; LEFT EYE: Status: ACTIVE | Noted: 2020-10-14

## 2022-03-18 NOTE — TELEPHONE ENCOUNTER
Called phone got busy signal will try again later  [email or web address]

## (undated) DEVICE — SINGLE-USE BIOPSY FORCEPS: Brand: RADIAL JAW 4